# Patient Record
Sex: FEMALE | Race: WHITE | NOT HISPANIC OR LATINO | Employment: OTHER | URBAN - METROPOLITAN AREA
[De-identification: names, ages, dates, MRNs, and addresses within clinical notes are randomized per-mention and may not be internally consistent; named-entity substitution may affect disease eponyms.]

---

## 2022-02-16 ENCOUNTER — OFFICE VISIT (OUTPATIENT)
Dept: FAMILY MEDICINE CLINIC | Facility: CLINIC | Age: 67
End: 2022-02-16

## 2022-02-16 ENCOUNTER — PATIENT ROUNDING (BHMG ONLY) (OUTPATIENT)
Dept: FAMILY MEDICINE CLINIC | Facility: CLINIC | Age: 67
End: 2022-02-16

## 2022-02-16 VITALS
OXYGEN SATURATION: 98 % | HEART RATE: 84 BPM | BODY MASS INDEX: 28.27 KG/M2 | DIASTOLIC BLOOD PRESSURE: 80 MMHG | HEIGHT: 60 IN | WEIGHT: 144 LBS | SYSTOLIC BLOOD PRESSURE: 132 MMHG | TEMPERATURE: 98.6 F

## 2022-02-16 DIAGNOSIS — E55.9 VITAMIN D DEFICIENCY: ICD-10-CM

## 2022-02-16 DIAGNOSIS — Z86.73 HISTORY OF STROKE: ICD-10-CM

## 2022-02-16 DIAGNOSIS — M54.50 CHRONIC MIDLINE LOW BACK PAIN WITHOUT SCIATICA: ICD-10-CM

## 2022-02-16 DIAGNOSIS — E78.2 MIXED HYPERLIPIDEMIA: ICD-10-CM

## 2022-02-16 DIAGNOSIS — G89.29 CHRONIC MIDLINE LOW BACK PAIN WITHOUT SCIATICA: ICD-10-CM

## 2022-02-16 DIAGNOSIS — I25.2 HISTORY OF MI (MYOCARDIAL INFARCTION): Primary | ICD-10-CM

## 2022-02-16 DIAGNOSIS — I10 PRIMARY HYPERTENSION: ICD-10-CM

## 2022-02-16 DIAGNOSIS — F33.40 RECURRENT MAJOR DEPRESSIVE DISORDER, IN REMISSION: ICD-10-CM

## 2022-02-16 DIAGNOSIS — R73.03 PREDIABETES: ICD-10-CM

## 2022-02-16 DIAGNOSIS — K21.9 GASTROESOPHAGEAL REFLUX DISEASE WITHOUT ESOPHAGITIS: ICD-10-CM

## 2022-02-16 PROBLEM — S22.060D CLOSED WEDGE COMPRESSION FRACTURE OF T7 VERTEBRA WITH ROUTINE HEALING: Status: ACTIVE | Noted: 2022-02-16

## 2022-02-16 PROBLEM — Z87.898 HISTORY OF SEIZURE: Status: ACTIVE | Noted: 2022-02-16

## 2022-02-16 PROCEDURE — 99204 OFFICE O/P NEW MOD 45 MIN: CPT | Performed by: FAMILY MEDICINE

## 2022-02-16 RX ORDER — CYCLOBENZAPRINE HCL 5 MG
5 TABLET ORAL 3 TIMES DAILY PRN
COMMUNITY
End: 2022-09-08 | Stop reason: SDUPTHER

## 2022-02-16 RX ORDER — CLONIDINE HYDROCHLORIDE 0.1 MG/1
0.1 TABLET ORAL 2 TIMES DAILY
Qty: 180 TABLET | Refills: 3 | Status: SHIPPED | OUTPATIENT
Start: 2022-02-16 | End: 2022-09-08 | Stop reason: SDUPTHER

## 2022-02-16 RX ORDER — CITALOPRAM 40 MG/1
40 TABLET ORAL DAILY
COMMUNITY
End: 2022-02-16 | Stop reason: SDUPTHER

## 2022-02-16 RX ORDER — ATENOLOL AND CHLORTHALIDONE TABLET 50; 25 MG/1; MG/1
1 TABLET ORAL DAILY
COMMUNITY
End: 2022-02-16 | Stop reason: SDUPTHER

## 2022-02-16 RX ORDER — HYDROXYZINE HYDROCHLORIDE 25 MG/1
25 TABLET, FILM COATED ORAL 3 TIMES DAILY PRN
COMMUNITY
End: 2022-09-08 | Stop reason: SDUPTHER

## 2022-02-16 RX ORDER — NAPROXEN SODIUM 220 MG
220 TABLET ORAL 2 TIMES DAILY PRN
COMMUNITY
End: 2022-09-08 | Stop reason: SDUPTHER

## 2022-02-16 RX ORDER — FAMOTIDINE 20 MG/1
20 TABLET, FILM COATED ORAL NIGHTLY PRN
COMMUNITY
End: 2022-02-16 | Stop reason: SDUPTHER

## 2022-02-16 RX ORDER — FAMOTIDINE 20 MG/1
20 TABLET, FILM COATED ORAL NIGHTLY PRN
Qty: 90 TABLET | Refills: 3 | Status: SHIPPED | OUTPATIENT
Start: 2022-02-16 | End: 2022-09-08 | Stop reason: SDUPTHER

## 2022-02-16 RX ORDER — EVOLOCUMAB 140 MG/ML
140 INJECTION, SOLUTION SUBCUTANEOUS
COMMUNITY
End: 2022-02-16 | Stop reason: SDUPTHER

## 2022-02-16 RX ORDER — ASPIRIN 325 MG
325 TABLET ORAL DAILY
COMMUNITY

## 2022-02-16 RX ORDER — CITALOPRAM 40 MG/1
40 TABLET ORAL DAILY
Qty: 90 TABLET | Refills: 3 | Status: SHIPPED | OUTPATIENT
Start: 2022-02-16 | End: 2022-09-08 | Stop reason: SDUPTHER

## 2022-02-16 RX ORDER — CLONIDINE HYDROCHLORIDE 0.1 MG/1
0.1 TABLET ORAL 2 TIMES DAILY
COMMUNITY
End: 2022-02-16 | Stop reason: SDUPTHER

## 2022-02-16 RX ORDER — ATENOLOL AND CHLORTHALIDONE TABLET 50; 25 MG/1; MG/1
1 TABLET ORAL DAILY
Qty: 90 TABLET | Refills: 3 | Status: SHIPPED | OUTPATIENT
Start: 2022-02-16 | End: 2022-09-08 | Stop reason: SDUPTHER

## 2022-02-16 RX ORDER — EVOLOCUMAB 140 MG/ML
140 INJECTION, SOLUTION SUBCUTANEOUS
Qty: 6 ML | Refills: 3 | Status: SHIPPED | OUTPATIENT
Start: 2022-02-16 | End: 2022-02-25 | Stop reason: SDUPTHER

## 2022-02-16 NOTE — PROGRESS NOTES
February 16, 2022    Hello, may I speak with Priscilla Fragoso?    My name is Sabrina    I am  with ALEE CASAREZ  [facility]  [address]  [place] [address].    Before we get started may I verify your date of birth? 1955    I am calling to officially welcome you to our practice and ask about your recent visit. Is this a good time to talk? yes    Tell me about your visit with us. What things went well?  Everything was great!       We're always looking for ways to make our patients' experiences even better. Do you have recommendations on ways we may improve?  no    Overall were you satisfied with your first visit to our practice? yes       I appreciate you taking the time to speak with me today. Is there anything else I can do for you? no      Thank you, and have a great day.

## 2022-02-16 NOTE — PROGRESS NOTES
"Subjective   Priscilla Fragoso is a 66 y.o. female.   Chief Complaint   Patient presents with   • Back Pain       History of Present Illness   Presents to the office today as a new patient.  Absolutely 0 information in epic.  No paper records scanned in.  Apparently moved here from [place].  Tells me moved here 3 months ago.  Last saw a doctor about 4 months ago.   She defines herself as a caregiver.  She has moved through several different states to take care of family members who needed her.  Most recently she was in [place].  Before that she was in [place].    She is about out of all meds and she needs refills.  We were able to verify her medication list with her today and this is how we populated her chart.    Had labs about 4 months ago when she last saw her most recent doctor..     Fell 2 days after got here from [place]- \"fractured her spine\".  Uses flexeril mainly at night to help with muscle stiffness and soreness related to this.  She was evaluated at [facility], but we do not have any records.    Problem list below is updated in real time with her using details she is able to verbally provide.    Patient Active Problem List    Diagnosis Date Noted   • Mixed hyperlipidemia 02/16/2022     Note Last Updated: 2/16/2022     Intolerant of statins - on Repatha     • Primary hypertension 02/16/2022   • Prediabetes 02/16/2022     Note Last Updated: 2/16/2022     Runs in the family     • History of stroke 02/16/2022     Note Last Updated: 2/16/2022     1986 - still with residual left arm problems     • History of MI (myocardial infarction) 02/16/2022     Note Last Updated: 2/16/2022 2000 - had a cath in [place] ?     • Vitamin D deficiency 02/16/2022   • Gastroesophageal reflux disease without esophagitis 02/16/2022     Note Last Updated: 2/16/2022     Describes LES dysfunction on UGI     • Chronic midline low back pain without sciatica 02/16/2022     Note Last Updated: 2/16/2022     " Due to compression fracture - no details of exactly where     • History of seizure 02/16/2022   • Recurrent major depressive disorder, in remission (HCC) 02/16/2022           Past Surgical History:   Procedure Laterality Date   • EYE SURGERY     • TUBAL ABDOMINAL LIGATION       Current Outpatient Medications on File Prior to Visit   Medication Sig   • aspirin 325 MG tablet Take 325 mg by mouth Daily.   • cyclobenzaprine (FLEXERIL) 5 MG tablet Take 5 mg by mouth 3 (Three) Times a Day As Needed for Muscle Spasms.   • hydrOXYzine (ATARAX) 25 MG tablet Take 25 mg by mouth 3 (Three) Times a Day As Needed for Itching.   • MONTELUKAST SODIUM PO Take  by mouth.   • naproxen sodium (ALEVE) 220 MG tablet Take 220 mg by mouth 2 (Two) Times a Day As Needed.   • sodium chloride 0.9 % nebulizer solution 0.88 mL with albuterol (5 MG/ML) 0.5% nebulizer solution 0.6 mg Take 10 mg/hr by nebulization Continuous.   • vitamin D3 125 MCG (5000 UT) capsule capsule Take 5,000 Units by mouth Daily.   • [DISCONTINUED] atenolol-chlorthalidone (TENORETIC) 50-25 MG per tablet Take 1 tablet by mouth Daily.   • [DISCONTINUED] citalopram (CeleXA) 40 MG tablet Take 40 mg by mouth Daily.   • [DISCONTINUED] cloNIDine (CATAPRES) 0.1 MG tablet Take 0.1 mg by mouth 2 (Two) Times a Day.   • [DISCONTINUED] esomeprazole (nexIUM) 20 MG capsule Take 20 mg by mouth Every Morning Before Breakfast.   • [DISCONTINUED] Evolocumab (Repatha) solution prefilled syringe injection Inject 140 mg under the skin into the appropriate area as directed.   • [DISCONTINUED] famotidine (PEPCID) 20 MG tablet Take 20 mg by mouth At Night As Needed for Heartburn.     No current facility-administered medications on file prior to visit.     Allergies   Allergen Reactions   • Penicillins Swelling   • Sulfa Antibiotics Rash   • Tetanus Toxoids Rash     Social History     Socioeconomic History   • Marital status:    Tobacco Use   • Smoking status: Former Smoker     Quit date:  "1994     Years since quittin.0   • Smokeless tobacco: Never Used   Substance and Sexual Activity   • Alcohol use: Not Currently     History reviewed. No pertinent family history.    Review of Systems    Objective   /80 (BP Location: Left arm, Patient Position: Sitting, Cuff Size: Adult)   Pulse 84   Temp 98.6 °F (37 °C) (Oral)   Ht 152.4 cm (60\")   Wt 65.3 kg (144 lb)   SpO2 98%   BMI 28.12 kg/m²   Physical Exam  Constitutional:       General: She is not in acute distress.     Appearance: She is well-developed. She is not toxic-appearing.      Comments: Wearing a face mask     HENT:      Head: Normocephalic and atraumatic.   Eyes:      Conjunctiva/sclera: Conjunctivae normal.   Cardiovascular:      Rate and Rhythm: Normal rate and regular rhythm.   Pulmonary:      Effort: Pulmonary effort is normal. No respiratory distress.   Musculoskeletal:         General: Normal range of motion.      Cervical back: Normal range of motion.      Right lower leg: No edema.      Left lower leg: No edema.   Skin:     General: Skin is warm and dry.      Findings: No rash.   Neurological:      Mental Status: She is alert and oriented to person, place, and time.      Gait: Gait abnormal (stiff, antalgic gait).   Psychiatric:         Mood and Affect: Mood normal.         Speech: Speech is tangential.         Behavior: Behavior normal. Behavior is cooperative.           Assessment/Plan   Diagnoses and all orders for this visit:    1. History of MI (myocardial infarction) (Primary)    2. Mixed hyperlipidemia  -     Evolocumab (Repatha) solution prefilled syringe injection; Inject 1 mL under the skin into the appropriate area as directed Every 14 (Fourteen) Days.  Dispense: 6 mL; Refill: 3    3. Primary hypertension  -     atenolol-chlorthalidone (TENORETIC) 50-25 MG per tablet; Take 1 tablet by mouth Daily.  Dispense: 90 tablet; Refill: 3  -     cloNIDine (CATAPRES) 0.1 MG tablet; Take 1 tablet by mouth 2 (Two) Times " a Day.  Dispense: 180 tablet; Refill: 3    4. Prediabetes    5. Vitamin D deficiency    6. Gastroesophageal reflux disease without esophagitis  -     esomeprazole (nexIUM) 20 MG capsule; Take 1 capsule by mouth Every Morning Before Breakfast.  Dispense: 90 capsule; Refill: 3  -     famotidine (PEPCID) 20 MG tablet; Take 1 tablet by mouth At Night As Needed for Heartburn.  Dispense: 90 tablet; Refill: 3    7. History of stroke    8. Chronic midline low back pain without sciatica  -     XR Spine Lumbar 2 or 3 View (In Office)  -     XR Spine Thoracic 2 View (In Office)    9. Recurrent major depressive disorder, in remission (HCC)  -     citalopram (CeleXA) 40 MG tablet; Take 1 tablet by mouth Daily.  Dispense: 90 tablet; Refill: 3    Over 45 minutes spent today, mainly talking with Priscilla and collecting verbal history.  We will need to get records from [place] about this fall that she described.  We will get x-rays of her lumbar and thoracic spine since I do not know where this fracture actually was.  If it happened several months ago, she will definitely need a follow-up x-ray regardless.  We have asked her to sign record releases and we will try to get records from her previous doctor to see if there are additional historical details and results of any labs that were done.  I will refill her medications at the doses she specifies today.    She informs me that she was unable to take other statins and that they were also ineffective at lowering her cholesterol, thus her transition to [facility].  I will see her back here in about 3 weeks, hopefully after we have some additional records and we can see what labs were done and make a decision about a labs that need to be repeated or other testing that may be indicated.  I will follow-up with her with the results of her x-ray at the next visit.    Call with any problems or concerns before next visit       Return in about 3 weeks (around 3/9/2022).      Much of this  report is an electronic transcription of spoken language to printed text using Dragon dictation software.  As such, the subtleties and finesse of spoken language may permit erroneous, or at times, nonsensical words or phrases to be inadvertently transcribed; thus changes may be made at a later date to rectify these errors.     Alysia Moe MD2/16/202221:58 EST  This note has been electronically signed

## 2022-02-22 DIAGNOSIS — J45.40 MODERATE PERSISTENT REACTIVE AIRWAY DISEASE WITHOUT COMPLICATION: Primary | ICD-10-CM

## 2022-02-22 DIAGNOSIS — K21.9 GASTROESOPHAGEAL REFLUX DISEASE WITHOUT ESOPHAGITIS: ICD-10-CM

## 2022-02-22 RX ORDER — ALBUTEROL SULFATE 90 UG/1
2 AEROSOL, METERED RESPIRATORY (INHALATION) EVERY 4 HOURS PRN
Qty: 54 G | Refills: 3 | Status: SHIPPED | OUTPATIENT
Start: 2022-02-22 | End: 2022-02-25 | Stop reason: SDUPTHER

## 2022-02-22 RX ORDER — ALBUTEROL SULFATE 0.63 MG/3ML
1 SOLUTION RESPIRATORY (INHALATION) NIGHTLY
Qty: 100 EACH | Refills: 3 | Status: SHIPPED | OUTPATIENT
Start: 2022-02-22 | End: 2022-02-25 | Stop reason: SDUPTHER

## 2022-02-25 DIAGNOSIS — J45.40 MODERATE PERSISTENT REACTIVE AIRWAY DISEASE WITHOUT COMPLICATION: ICD-10-CM

## 2022-02-25 DIAGNOSIS — E78.2 MIXED HYPERLIPIDEMIA: ICD-10-CM

## 2022-02-25 RX ORDER — ALBUTEROL SULFATE 0.63 MG/3ML
1 SOLUTION RESPIRATORY (INHALATION) NIGHTLY
Qty: 100 EACH | Refills: 3 | Status: SHIPPED | OUTPATIENT
Start: 2022-02-25 | End: 2022-09-08 | Stop reason: SDUPTHER

## 2022-02-25 RX ORDER — ALBUTEROL SULFATE 90 UG/1
2 AEROSOL, METERED RESPIRATORY (INHALATION) EVERY 4 HOURS PRN
Qty: 54 G | Refills: 3 | Status: SHIPPED | OUTPATIENT
Start: 2022-02-25 | End: 2022-09-08 | Stop reason: SDUPTHER

## 2022-02-25 RX ORDER — EVOLOCUMAB 140 MG/ML
140 INJECTION, SOLUTION SUBCUTANEOUS
Qty: 6 ML | Refills: 3 | Status: SHIPPED | OUTPATIENT
Start: 2022-02-25 | End: 2022-09-08 | Stop reason: SDUPTHER

## 2022-02-25 NOTE — TELEPHONE ENCOUNTER
Caller: [place] Priscilla    Relationship: Self    Best call back number: [phone]    Requested Prescriptions:   Requested Prescriptions     Pending Prescriptions Disp Refills   • Evolocumab (Repatha) solution prefilled syringe injection 6 mL 3     Sig: Inject 1 mL under the skin into the appropriate area as directed Every 14 (Fourteen) Days.   • albuterol sulfate  (90 Base) MCG/ACT inhaler 54 g 3     Sig: Inhale 2 puffs Every 4 (Four) Hours As Needed for Wheezing.   • albuterol (ACCUNEB) 0.63 MG/3ML nebulizer solution 100 each 3     Sig: Take 3 mL by nebulization Every Night.        Pharmacy where request should be sent: [id number] - [place], [place] - [address] [facility] [address] [facility] - [phone] [address] [phone] FX     Additional details provided by patient:PATIENT STATES SHE IS COMPLETELY OUT OF THE MEDICATION.    Does the patient have less than a 3 day supply:  [x] Yes  [] No    Lisa Haynes Rep   02/25/22 14:20 EST

## 2022-03-28 ENCOUNTER — TELEPHONE (OUTPATIENT)
Dept: FAMILY MEDICINE CLINIC | Facility: CLINIC | Age: 67
End: 2022-03-28

## 2022-03-28 DIAGNOSIS — E78.2 MIXED HYPERLIPIDEMIA: ICD-10-CM

## 2022-03-28 RX ORDER — EVOLOCUMAB 140 MG/ML
140 INJECTION, SOLUTION SUBCUTANEOUS
Qty: 6 ML | Refills: 3 | Status: CANCELLED | OUTPATIENT
Start: 2022-03-28

## 2022-03-28 NOTE — TELEPHONE ENCOUNTER
Called patient do not have records for me to do the pa request. She is calling to see why we have not received them

## 2022-06-24 ENCOUNTER — TELEPHONE (OUTPATIENT)
Dept: FAMILY MEDICINE CLINIC | Facility: CLINIC | Age: 67
End: 2022-06-24

## 2022-06-24 RX ORDER — ONDANSETRON 4 MG/1
4 TABLET, ORALLY DISINTEGRATING ORAL EVERY 8 HOURS PRN
Qty: 24 TABLET | Refills: 0 | Status: SHIPPED | OUTPATIENT
Start: 2022-06-24

## 2022-06-24 NOTE — TELEPHONE ENCOUNTER
Called patient she states she got a test from the health department. She states she woke up Tuesday with diarrhea, nausea, headache, and vomiting. Patient states she would like something for nausea. Please advise.     She uses [facility] in [place]

## 2022-06-24 NOTE — TELEPHONE ENCOUNTER
Caller: Priscilla Fragoso    Relationship to patient: Self    Best call back number: [phone]     Date of positive COVID19 test: 6/21/22    COVID19 symptoms: STOMACH ACHE, DIARRHEA, BODY ACHES, SHORTNESS OF BREATH    Date of initial quarantine: 6/21/22    Additional information or concerns: PATIENT IS WANTING TO KNOW IF A MEDICATION CAN BE CALLED IN TO HELP WITH HER SYMPTOMS.       What is the patients preferred pharmacy: [id number] - [place], [place] - [address] - [phone] [facility] [phone]

## 2022-06-24 NOTE — TELEPHONE ENCOUNTER
I have sent a prescription for a dissolving tablet called Zofran.  She can take it every 8 hours.  Should help with the nausea and vomiting.  Try to keep up fluid intake to prevent dehydration.  Typically small volumes of fluid frequently, such as 1 ounce every hour can be kept down without causing vomiting.  If her condition worsens over the weekend, then I would recommend she go to the emergency room.  Thanks

## 2022-09-08 ENCOUNTER — OFFICE VISIT (OUTPATIENT)
Dept: FAMILY MEDICINE CLINIC | Facility: CLINIC | Age: 67
End: 2022-09-08

## 2022-09-08 VITALS
HEART RATE: 85 BPM | BODY MASS INDEX: 28 KG/M2 | TEMPERATURE: 97.3 F | SYSTOLIC BLOOD PRESSURE: 156 MMHG | DIASTOLIC BLOOD PRESSURE: 83 MMHG | WEIGHT: 142.6 LBS | HEIGHT: 60 IN | OXYGEN SATURATION: 94 %

## 2022-09-08 DIAGNOSIS — J45.40 MODERATE PERSISTENT REACTIVE AIRWAY DISEASE WITHOUT COMPLICATION: ICD-10-CM

## 2022-09-08 DIAGNOSIS — G89.29 CHRONIC MIDLINE LOW BACK PAIN WITHOUT SCIATICA: ICD-10-CM

## 2022-09-08 DIAGNOSIS — K21.9 GASTROESOPHAGEAL REFLUX DISEASE WITHOUT ESOPHAGITIS: ICD-10-CM

## 2022-09-08 DIAGNOSIS — S22.060D CLOSED WEDGE COMPRESSION FRACTURE OF T7 VERTEBRA WITH ROUTINE HEALING: ICD-10-CM

## 2022-09-08 DIAGNOSIS — F33.40 RECURRENT MAJOR DEPRESSIVE DISORDER, IN REMISSION: ICD-10-CM

## 2022-09-08 DIAGNOSIS — E78.2 MIXED HYPERLIPIDEMIA: ICD-10-CM

## 2022-09-08 DIAGNOSIS — M54.2 NECK PAIN: Primary | ICD-10-CM

## 2022-09-08 DIAGNOSIS — M54.50 CHRONIC MIDLINE LOW BACK PAIN WITHOUT SCIATICA: ICD-10-CM

## 2022-09-08 DIAGNOSIS — I10 PRIMARY HYPERTENSION: ICD-10-CM

## 2022-09-08 PROCEDURE — 99213 OFFICE O/P EST LOW 20 MIN: CPT | Performed by: NURSE PRACTITIONER

## 2022-09-08 RX ORDER — CYCLOBENZAPRINE HCL 5 MG
5 TABLET ORAL 3 TIMES DAILY PRN
Qty: 90 TABLET | Refills: 1 | Status: SHIPPED | OUTPATIENT
Start: 2022-09-08

## 2022-09-08 RX ORDER — CLONIDINE HYDROCHLORIDE 0.1 MG/1
0.1 TABLET ORAL 2 TIMES DAILY
Qty: 180 TABLET | Refills: 3 | Status: SHIPPED | OUTPATIENT
Start: 2022-09-08 | End: 2023-01-26 | Stop reason: SDUPTHER

## 2022-09-08 RX ORDER — EVOLOCUMAB 140 MG/ML
140 INJECTION, SOLUTION SUBCUTANEOUS
Qty: 6 ML | Refills: 3 | Status: SHIPPED | OUTPATIENT
Start: 2022-09-08

## 2022-09-08 RX ORDER — MONTELUKAST SODIUM 10 MG/1
10 TABLET ORAL NIGHTLY
Qty: 90 TABLET | Refills: 1 | Status: SHIPPED | OUTPATIENT
Start: 2022-09-08 | End: 2023-01-26 | Stop reason: SDUPTHER

## 2022-09-08 RX ORDER — CITALOPRAM 40 MG/1
40 TABLET ORAL DAILY
Qty: 90 TABLET | Refills: 1 | Status: SHIPPED | OUTPATIENT
Start: 2022-09-08 | End: 2023-01-24

## 2022-09-08 RX ORDER — PREDNISONE 5 MG/1
TABLET ORAL
Qty: 20 TABLET | Refills: 0 | Status: SHIPPED | OUTPATIENT
Start: 2022-09-08 | End: 2022-09-16

## 2022-09-08 RX ORDER — ATENOLOL AND CHLORTHALIDONE TABLET 50; 25 MG/1; MG/1
1 TABLET ORAL DAILY
Qty: 90 TABLET | Refills: 3 | Status: SHIPPED | OUTPATIENT
Start: 2022-09-08 | End: 2023-01-26 | Stop reason: SDUPTHER

## 2022-09-08 RX ORDER — FAMOTIDINE 20 MG/1
20 TABLET, FILM COATED ORAL NIGHTLY PRN
Qty: 90 TABLET | Refills: 3 | Status: SHIPPED | OUTPATIENT
Start: 2022-09-08 | End: 2023-01-26 | Stop reason: SDUPTHER

## 2022-09-08 RX ORDER — ALBUTEROL SULFATE 0.63 MG/3ML
1 SOLUTION RESPIRATORY (INHALATION) NIGHTLY
Qty: 100 EACH | Refills: 3 | Status: SHIPPED | OUTPATIENT
Start: 2022-09-08 | End: 2023-01-26 | Stop reason: SDUPTHER

## 2022-09-08 RX ORDER — ALBUTEROL SULFATE 90 UG/1
2 AEROSOL, METERED RESPIRATORY (INHALATION) EVERY 4 HOURS PRN
Qty: 54 G | Refills: 3 | Status: SHIPPED | OUTPATIENT
Start: 2022-09-08 | End: 2023-01-26 | Stop reason: SDUPTHER

## 2022-09-08 RX ORDER — NAPROXEN SODIUM 220 MG
220 TABLET ORAL 2 TIMES DAILY WITH MEALS
Qty: 60 TABLET | Refills: 2 | Status: SHIPPED | OUTPATIENT
Start: 2022-09-08

## 2022-09-08 RX ORDER — HYDROXYZINE HYDROCHLORIDE 25 MG/1
25 TABLET, FILM COATED ORAL 3 TIMES DAILY PRN
Qty: 90 TABLET | Refills: 2 | Status: SHIPPED | OUTPATIENT
Start: 2022-09-08 | End: 2023-01-26 | Stop reason: SDUPTHER

## 2022-09-08 NOTE — PROGRESS NOTES
"    Priscilla Fragoso is a 67 y.o. female.     67-year-old white female with history of degenerative disc disease thoracic and lumbar and T7 compression fracture who comes in today with complaints of mid back pain with numbness and tingling and left arm also complains of low back pain with radiculopathy.  She also complains of neck pain she is taking ibuprofen at home I am reordering her Aleve and Flexeril ordered by Dr. Moe I am placing her on low-dose steroids.  I instructed her to use heat and do absolutely no lifting while on these medications.  I also advised her next course of action would be physical therapy and to follow-up with Dr. Moe    Vital signs are stable              Prednisone 5 mg taper dose  Home Aleve and Flexeril  Moist heat  No lifting  Cervical x-ray  Follow-up with Dr. Moe no improvement                       The following portions of the patient's history were reviewed and updated as appropriate: allergies, current medications, past family history, past medical history, past social history, past surgical history and problem list.    Vitals:    09/08/22 1421   BP: 156/83   BP Location: Right arm   Patient Position: Sitting   Cuff Size: Adult   Pulse: 85   Temp: 97.3 °F (36.3 °C)   TempSrc: Temporal   SpO2: 94%   Weight: 64.7 kg (142 lb 9.6 oz)   Height: 152.4 cm (60\")     Body mass index is 27.85 kg/m².    Past Medical History:   Diagnosis Date   • Acid reflux    • Depression    • Headache    • Hyperlipidemia    • Hypertension    • Peptic ulceration    • Stroke (HCC)      Past Surgical History:   Procedure Laterality Date   • EYE SURGERY     • TUBAL ABDOMINAL LIGATION       History reviewed. No pertinent family history.    There is no immunization history on file for this patient.    No results found for any previous visit.         Review of Systems   Constitutional: Negative.    HENT: Negative.    Respiratory: Negative.    Genitourinary: Negative.    Musculoskeletal: Positive for " back pain and neck pain.   Skin: Negative.    Neurological: Positive for numbness.   Psychiatric/Behavioral: Negative.        Objective   Physical Exam  Constitutional:       Appearance: Normal appearance.   HENT:      Head: Normocephalic.   Cardiovascular:      Rate and Rhythm: Normal rate and regular rhythm.      Pulses: Normal pulses.   Pulmonary:      Effort: Pulmonary effort is normal.   Abdominal:      General: Bowel sounds are normal.   Musculoskeletal:         General: Normal range of motion.   Skin:     General: Skin is warm and dry.   Neurological:      General: No focal deficit present.      Mental Status: She is alert.         Procedures    Assessment & Plan   Diagnoses and all orders for this visit:    1. Neck pain (Primary)  -     XR Spine Cervical 2 or 3 View    2. Chronic midline low back pain without sciatica  -     naproxen sodium (ALEVE) 220 MG tablet; Take 1 tablet by mouth 2 (Two) Times a Day With Meals.  Dispense: 60 tablet; Refill: 2  -     cyclobenzaprine (FLEXERIL) 5 MG tablet; Take 1 tablet by mouth 3 (Three) Times a Day As Needed for Muscle Spasms.  Dispense: 90 tablet; Refill: 1    3. Gastroesophageal reflux disease without esophagitis  -     famotidine (PEPCID) 20 MG tablet; Take 1 tablet by mouth At Night As Needed for Heartburn.  Dispense: 90 tablet; Refill: 3  -     esomeprazole (nexIUM) 20 MG capsule; Take 1 capsule by mouth Every Morning Before Breakfast.  Dispense: 90 capsule; Refill: 3    4. Primary hypertension  -     cloNIDine (CATAPRES) 0.1 MG tablet; Take 1 tablet by mouth 2 (Two) Times a Day.  Dispense: 180 tablet; Refill: 3  -     atenolol-chlorthalidone (TENORETIC) 50-25 MG per tablet; Take 1 tablet by mouth Daily.  Dispense: 90 tablet; Refill: 3    5. Recurrent major depressive disorder, in remission (HCC)  -     citalopram (CeleXA) 40 MG tablet; Take 1 tablet by mouth Daily.  Dispense: 90 tablet; Refill: 1    6. Moderate persistent reactive airway disease without  complication  -     albuterol sulfate  (90 Base) MCG/ACT inhaler; Inhale 2 puffs Every 4 (Four) Hours As Needed for Wheezing.  Dispense: 54 g; Refill: 3  -     albuterol (ACCUNEB) 0.63 MG/3ML nebulizer solution; Take 3 mL by nebulization Every Night.  Dispense: 100 each; Refill: 3    7. Mixed hyperlipidemia  -     Evolocumab (Repatha) solution prefilled syringe injection; Inject 1 mL under the skin into the appropriate area as directed Every 14 (Fourteen) Days.  Dispense: 6 mL; Refill: 3    8. Closed wedge compression fracture of T7 vertebra with routine healing    Other orders  -     hydrOXYzine (ATARAX) 25 MG tablet; Take 1 tablet by mouth 3 (Three) Times a Day As Needed for Itching.  Dispense: 90 tablet; Refill: 2  -     montelukast (SINGULAIR) 10 MG tablet; Take 1 tablet by mouth Every Night.  Dispense: 90 tablet; Refill: 1  -     predniSONE 5 MG (48) tablet therapy pack dose pack; Take 4 tablets by mouth Daily for 2 days, THEN 3 tablets Daily for 2 days, THEN 2 tablets Daily for 2 days, THEN 1 tablet Daily for 2 days.  Dispense: 20 tablet; Refill: 0          Current Outpatient Medications:   •  albuterol (ACCUNEB) 0.63 MG/3ML nebulizer solution, Take 3 mL by nebulization Every Night., Disp: 100 each, Rfl: 3  •  albuterol sulfate  (90 Base) MCG/ACT inhaler, Inhale 2 puffs Every 4 (Four) Hours As Needed for Wheezing., Disp: 54 g, Rfl: 3  •  aspirin 325 MG tablet, Take 325 mg by mouth Daily., Disp: , Rfl:   •  atenolol-chlorthalidone (TENORETIC) 50-25 MG per tablet, Take 1 tablet by mouth Daily., Disp: 90 tablet, Rfl: 3  •  citalopram (CeleXA) 40 MG tablet, Take 1 tablet by mouth Daily., Disp: 90 tablet, Rfl: 1  •  cloNIDine (CATAPRES) 0.1 MG tablet, Take 1 tablet by mouth 2 (Two) Times a Day., Disp: 180 tablet, Rfl: 3  •  cyclobenzaprine (FLEXERIL) 5 MG tablet, Take 1 tablet by mouth 3 (Three) Times a Day As Needed for Muscle Spasms., Disp: 90 tablet, Rfl: 1  •  esomeprazole (nexIUM) 20 MG capsule,  Take 1 capsule by mouth Every Morning Before Breakfast., Disp: 90 capsule, Rfl: 3  •  Evolocumab (Repatha) solution prefilled syringe injection, Inject 1 mL under the skin into the appropriate area as directed Every 14 (Fourteen) Days., Disp: 6 mL, Rfl: 3  •  famotidine (PEPCID) 20 MG tablet, Take 1 tablet by mouth At Night As Needed for Heartburn., Disp: 90 tablet, Rfl: 3  •  hydrOXYzine (ATARAX) 25 MG tablet, Take 1 tablet by mouth 3 (Three) Times a Day As Needed for Itching., Disp: 90 tablet, Rfl: 2  •  naproxen sodium (ALEVE) 220 MG tablet, Take 1 tablet by mouth 2 (Two) Times a Day With Meals., Disp: 60 tablet, Rfl: 2  •  ondansetron ODT (Zofran ODT) 4 MG disintegrating tablet, Place 1 tablet on the tongue Every 8 (Eight) Hours As Needed for Nausea or Vomiting., Disp: 24 tablet, Rfl: 0  •  vitamin D3 125 MCG (5000 UT) capsule capsule, Take 5,000 Units by mouth Daily., Disp: , Rfl:   •  montelukast (SINGULAIR) 10 MG tablet, Take 1 tablet by mouth Every Night., Disp: 90 tablet, Rfl: 1  •  predniSONE 5 MG (48) tablet therapy pack dose pack, Take 4 tablets by mouth Daily for 2 days, THEN 3 tablets Daily for 2 days, THEN 2 tablets Daily for 2 days, THEN 1 tablet Daily for 2 days., Disp: 20 tablet, Rfl: 0           DAYRON Barnes 9/8/2022 14:50 EDT  This note has been electronically signed

## 2022-09-08 NOTE — PATIENT INSTRUCTIONS
Prednisone 5 mg taper dose  Home Aleve and Flexeril  Moist heat  No lifting  Cervical x-ray  Follow-up with Dr. Moe no improvement

## 2023-01-24 ENCOUNTER — OFFICE VISIT (OUTPATIENT)
Dept: FAMILY MEDICINE CLINIC | Facility: CLINIC | Age: 68
End: 2023-01-24
Payer: MEDICARE

## 2023-01-24 VITALS
TEMPERATURE: 98.5 F | HEART RATE: 87 BPM | BODY MASS INDEX: 28 KG/M2 | HEIGHT: 60 IN | WEIGHT: 142.6 LBS | DIASTOLIC BLOOD PRESSURE: 90 MMHG | OXYGEN SATURATION: 98 % | SYSTOLIC BLOOD PRESSURE: 138 MMHG

## 2023-01-24 DIAGNOSIS — R11.2 NAUSEA AND VOMITING, UNSPECIFIED VOMITING TYPE: Primary | ICD-10-CM

## 2023-01-24 DIAGNOSIS — R10.31 ABDOMINAL PAIN, RLQ: ICD-10-CM

## 2023-01-24 DIAGNOSIS — R10.32 LLQ PAIN: ICD-10-CM

## 2023-01-24 DIAGNOSIS — R10.13 EPIGASTRIC PAIN: ICD-10-CM

## 2023-01-24 LAB
EXPIRATION DATE: NORMAL
FLUAV AG UPPER RESP QL IA.RAPID: NOT DETECTED
FLUBV AG UPPER RESP QL IA.RAPID: NOT DETECTED
INTERNAL CONTROL: NORMAL
Lab: NORMAL
SARS-COV-2 AG UPPER RESP QL IA.RAPID: NOT DETECTED

## 2023-01-24 PROCEDURE — 87428 SARSCOV & INF VIR A&B AG IA: CPT | Performed by: NURSE PRACTITIONER

## 2023-01-24 PROCEDURE — 99214 OFFICE O/P EST MOD 30 MIN: CPT | Performed by: NURSE PRACTITIONER

## 2023-01-24 RX ORDER — PROMETHAZINE HYDROCHLORIDE 12.5 MG/1
12.5 TABLET ORAL EVERY 6 HOURS PRN
Qty: 21 TABLET | Refills: 0 | Status: SHIPPED | OUTPATIENT
Start: 2023-01-24

## 2023-01-24 NOTE — ASSESSMENT & PLAN NOTE
Findings:  There is a scattered amount of gas and residue within bowel in a nonspecific fashion. There are pelvic calcifications which could reflect phleboliths. There are some degenerative changes in the lower lumbar spine.     IMPRESSION:  1.Nonspecific bowel gas pattern.

## 2023-01-24 NOTE — PROGRESS NOTES
"Chief Complaint  Vomiting    Subjective          Priscilla Fragoso presents to [facility]      History of Present Illness    Priscilla is a 67-year-old female patient of Dr. Alysia Moe who presents today with complaints of vomiting.     Nine days ago woke up with headache and vomited yellow bile. She has since been with vomiting in the morning upon waking. She does not have any CP. She is not eating and drinking normally. She has been able to keep down room temp water and bullion hardly anything else.  She did have some saltines at one point.  When she sits up sits up, she vomits on more occasions than not. Hx of GERD. 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 She is negative for flu and COVID today.    She is under lots of stress at home. Her sister is with brain lesions and she is worried. This is around the time she started vomiting. She tells me she used to see gastroenterology in [place].  She is currently on a PPI omeprazole 20 mg daily.  She does also take Pepcid 20 mg nightly.  Blood pressure stable at 138/90.  Patient afebrile.      Objective     Vital Signs:   /90 (BP Location: Left arm, Patient Position: Sitting, Cuff Size: Adult)   Pulse 87   Temp 98.5 °F (36.9 °C) (Oral)   Ht 152.4 cm (60\")   Wt 64.7 kg (142 lb 9.6 oz)   SpO2 98%   BMI 27.85 kg/m²           Physical Exam  Vitals reviewed.   Constitutional:       Appearance: She is well-developed.      Comments: Wearing a face mask     HENT:      Head: Normocephalic and atraumatic.   Eyes:      Conjunctiva/sclera: Conjunctivae normal.   Cardiovascular:      Rate and Rhythm: Normal rate.   Pulmonary:      Effort: Pulmonary effort is normal.   Abdominal:      General: Bowel sounds are normal.      Palpations: Abdomen is soft. There is no shifting dullness or hepatomegaly.      Tenderness: There is abdominal tenderness in the right lower quadrant, epigastric area and left lower quadrant. There is " guarding. There is no right CVA tenderness, left CVA tenderness or rebound.       Musculoskeletal:         General: Normal range of motion.      Cervical back: Normal range of motion.   Skin:     General: Skin is warm and dry.      Findings: No rash.   Neurological:      Mental Status: She is alert and oriented to person, place, and time.   Psychiatric:         Behavior: Behavior normal.                Result Review :                                   Assessment and Plan      Diagnoses and all orders for this visit:    1. Nausea and vomiting, unspecified vomiting type (Primary)  -     POCT SARS-CoV-2 Antigen SUKH  -     XR Abdomen KUB (In Office)  -     promethazine (PHENERGAN) 12.5 MG tablet; Take 1 tablet by mouth Every 6 (Six) Hours As Needed for Nausea or Vomiting.  Dispense: 21 tablet; Refill: 0  -     Cancel: US Abdomen Complete; Future  -     US Abdomen Complete; Future  -     Ambulatory Referral to Gastroenterology    2. Epigastric pain  Assessment & Plan:  Findings:  There is a scattered amount of gas and residue within bowel in a nonspecific fashion. There are pelvic calcifications which could reflect phleboliths. There are some degenerative changes in the lower lumbar spine.     IMPRESSION:  1.Nonspecific bowel gas pattern.    Orders:  -     Cancel: US Abdomen Complete; Future  -     US Abdomen Complete; Future  -     Ambulatory Referral to Gastroenterology    3. LLQ pain  -     Cancel: US Abdomen Complete; Future  -     US Abdomen Complete; Future  -     Ambulatory Referral to Gastroenterology    4. Abdominal pain, RLQ  -     Cancel: US Abdomen Complete; Future  -     US Abdomen Complete; Future  -     Ambulatory Referral to Gastroenterology    Patient KUB negative for intestinal obstruction, constipation.  No negation of stone.  It is possible patient has inflammatory bowel disease.  Her symptoms are diffuse.  She has a history of GERD.  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  I am going  to give her Phenergan to help with the nausea and vomiting.  In the meantime, I Dolly refer her for ultrasound of abdomen complete and I would like to get her referred to Dr. Flores with gastroenterology for evaluation.  Patient prefers somewhere close to her.            Follow Up       No follow-ups on file.      Patient was given instructions and counseling regarding her condition or for health maintenance advice. Please see specific information pulled into the AVS if appropriate.     Jacinta Andrade, APRN1/24/202313:58 EST  This note has been electronically signed

## 2023-01-26 DIAGNOSIS — I25.2 HISTORY OF MI (MYOCARDIAL INFARCTION): ICD-10-CM

## 2023-01-26 DIAGNOSIS — J45.40 MODERATE PERSISTENT REACTIVE AIRWAY DISEASE WITHOUT COMPLICATION: ICD-10-CM

## 2023-01-26 DIAGNOSIS — K21.9 GASTROESOPHAGEAL REFLUX DISEASE WITHOUT ESOPHAGITIS: ICD-10-CM

## 2023-01-26 DIAGNOSIS — I10 PRIMARY HYPERTENSION: ICD-10-CM

## 2023-01-26 RX ORDER — CLONIDINE HYDROCHLORIDE 0.1 MG/1
0.1 TABLET ORAL 2 TIMES DAILY
Qty: 180 TABLET | Refills: 3 | Status: SHIPPED | OUTPATIENT
Start: 2023-01-26

## 2023-01-26 RX ORDER — ATENOLOL AND CHLORTHALIDONE TABLET 50; 25 MG/1; MG/1
1 TABLET ORAL DAILY
Qty: 90 TABLET | Refills: 3 | Status: SHIPPED | OUTPATIENT
Start: 2023-01-26

## 2023-01-26 RX ORDER — ALBUTEROL SULFATE 0.63 MG/3ML
1 SOLUTION RESPIRATORY (INHALATION) NIGHTLY
Qty: 100 EACH | Refills: 3 | Status: SHIPPED | OUTPATIENT
Start: 2023-01-26

## 2023-01-26 RX ORDER — ALBUTEROL SULFATE 90 UG/1
2 AEROSOL, METERED RESPIRATORY (INHALATION) EVERY 4 HOURS PRN
Qty: 54 G | Refills: 3 | Status: SHIPPED | OUTPATIENT
Start: 2023-01-26

## 2023-01-26 RX ORDER — MONTELUKAST SODIUM 10 MG/1
10 TABLET ORAL NIGHTLY
Qty: 90 TABLET | Refills: 1 | Status: SHIPPED | OUTPATIENT
Start: 2023-01-26

## 2023-01-26 RX ORDER — HYDROXYZINE HYDROCHLORIDE 25 MG/1
25 TABLET, FILM COATED ORAL 3 TIMES DAILY PRN
Qty: 90 TABLET | Refills: 2 | Status: SHIPPED | OUTPATIENT
Start: 2023-01-26

## 2023-01-26 RX ORDER — FAMOTIDINE 20 MG/1
20 TABLET, FILM COATED ORAL NIGHTLY PRN
Qty: 90 TABLET | Refills: 3 | Status: SHIPPED | OUTPATIENT
Start: 2023-01-26

## 2023-01-26 NOTE — TELEPHONE ENCOUNTER
Caller: Priscilla Fragoso    Relationship: Self    Best call back number: [phone]    Requested Prescriptions:   Requested Prescriptions     Pending Prescriptions Disp Refills   • albuterol (ACCUNEB) 0.63 MG/3ML nebulizer solution 100 each 3     Sig: Take 3 mL by nebulization Every Night.   • albuterol sulfate  (90 Base) MCG/ACT inhaler 54 g 3     Sig: Inhale 2 puffs Every 4 (Four) Hours As Needed for Wheezing.   • esomeprazole (nexIUM) 20 MG capsule 90 capsule 3     Sig: Take 1 capsule by mouth Every Morning Before Breakfast.   • hydrOXYzine (ATARAX) 25 MG tablet 90 tablet 2     Sig: Take 1 tablet by mouth 3 (Three) Times a Day As Needed for Itching.   • atenolol-chlorthalidone (TENORETIC) 50-25 MG per tablet 90 tablet 3     Sig: Take 1 tablet by mouth Daily.   • montelukast (SINGULAIR) 10 MG tablet 90 tablet 1     Sig: Take 1 tablet by mouth Every Night.   • famotidine (PEPCID) 20 MG tablet 90 tablet 3     Sig: Take 1 tablet by mouth At Night As Needed for Heartburn.   • cloNIDine (CATAPRES) 0.1 MG tablet 180 tablet 3     Sig: Take 1 tablet by mouth 2 (Two) Times a Day.        Pharmacy where request should be sent: [id number] - [place], [place] - [address] AT [facility] [phone] [facility] [phone]      Additional details provided by patient: STATES SHE NEEDS REFILLS ON THESE [facility]     Does the patient have less than a 3 day supply:  [] Yes  [x] No    Would you like a call back once the refill request has been completed: [x] Yes [] No    If the office needs to give you a call back, can they leave a voicemail: [x] Yes [] No    Lisa Savage Rep   01/26/23 14:19 EST

## 2023-02-08 ENCOUNTER — HOSPITAL ENCOUNTER (OUTPATIENT)
Dept: ULTRASOUND IMAGING | Facility: HOSPITAL | Age: 68
Discharge: HOME | End: 2023-02-08
Admitting: NURSE PRACTITIONER
Payer: MEDICARE

## 2023-02-08 DIAGNOSIS — R10.32 LLQ PAIN: ICD-10-CM

## 2023-02-08 DIAGNOSIS — R10.31 ABDOMINAL PAIN, RLQ: ICD-10-CM

## 2023-02-08 DIAGNOSIS — R11.2 NAUSEA AND VOMITING, UNSPECIFIED VOMITING TYPE: ICD-10-CM

## 2023-02-08 DIAGNOSIS — R10.13 EPIGASTRIC PAIN: ICD-10-CM

## 2023-02-08 PROCEDURE — 76705 ECHO EXAM OF ABDOMEN: CPT

## 2023-02-09 ENCOUNTER — TELEPHONE (OUTPATIENT)
Dept: FAMILY MEDICINE CLINIC | Facility: CLINIC | Age: 68
End: 2023-02-09
Payer: MEDICARE

## 2023-02-09 NOTE — TELEPHONE ENCOUNTER
HUB TO READ  I left  for pt that was detailed, but if she calls back, please give her msg below from Jacinta:     Please advise Priscilla that the abdominal ultrasound is normal.

## 2023-04-27 ENCOUNTER — PATIENT OUTREACH (OUTPATIENT)
Dept: CASE MANAGEMENT | Facility: OTHER | Age: 68
End: 2023-04-27
Payer: MEDICARE

## 2023-04-27 NOTE — OUTREACH NOTE
AMBULATORY CASE MANAGEMENT NOTE    RN-ACM outreach attempts unsuccessful x4/LVMs w/ RN-ACM contact info; patient call back welcome.   Patient eligible for HRCM outreach and assistance as needed.  No additional outreach scheduled at this time.    EZEQUIEL ZAIDI  Ambulatory Case Management  4/27/2023, 15:56 EDT

## 2023-07-24 RX ORDER — HYDROXYZINE HYDROCHLORIDE 25 MG/1
TABLET, FILM COATED ORAL
Qty: 90 TABLET | Refills: 0 | Status: SHIPPED | OUTPATIENT
Start: 2023-07-24

## 2023-07-24 NOTE — TELEPHONE ENCOUNTER
I will go ahead and fill the hydroxyzine however, patient needs to follow-up on chronic medical conditions with Dr. Alysia Moe.

## 2023-08-02 RX ORDER — MONTELUKAST SODIUM 10 MG/1
10 TABLET ORAL NIGHTLY
Qty: 90 TABLET | Refills: 1 | Status: SHIPPED | OUTPATIENT
Start: 2023-08-02

## 2023-08-18 RX ORDER — HYDROXYZINE HYDROCHLORIDE 25 MG/1
TABLET, FILM COATED ORAL
Qty: 90 TABLET | Refills: 0 | Status: SHIPPED | OUTPATIENT
Start: 2023-08-18

## 2023-08-18 NOTE — TELEPHONE ENCOUNTER
Caller: AI CARDONA    Relationship:     Best call back number: [phone]    What was the call regarding: [facility] [place] IS REQUESTING A PRIOR AUTHORIZATION ON THE   hydrOXYzine (ATARAX) 25 MG tablet [Pharmacy Med Name: HYDROXYZINE HCL 25MG TABS (WHITE)] [3774] (Order [id number]

## 2023-08-22 ENCOUNTER — OFFICE VISIT (OUTPATIENT)
Dept: FAMILY MEDICINE CLINIC | Facility: CLINIC | Age: 68
End: 2023-08-22
Payer: MEDICARE

## 2023-08-22 VITALS
SYSTOLIC BLOOD PRESSURE: 142 MMHG | HEART RATE: 72 BPM | TEMPERATURE: 97.2 F | HEIGHT: 60 IN | WEIGHT: 141.6 LBS | DIASTOLIC BLOOD PRESSURE: 78 MMHG | OXYGEN SATURATION: 99 % | BODY MASS INDEX: 27.8 KG/M2

## 2023-08-22 DIAGNOSIS — G89.29 CHRONIC MIDLINE LOW BACK PAIN WITHOUT SCIATICA: ICD-10-CM
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
## 2023-08-22 DIAGNOSIS — M54.2 NECK PAIN: Primary | ICD-10-CM

## 2023-08-22 DIAGNOSIS — M54.50 CHRONIC MIDLINE LOW BACK PAIN WITHOUT SCIATICA: ICD-10-CM

## 2023-08-22 PROCEDURE — 99213 OFFICE O/P EST LOW 20 MIN: CPT | Performed by: NURSE PRACTITIONER

## 2023-08-22 PROCEDURE — 3078F DIAST BP <80 MM HG: CPT | Performed by: NURSE PRACTITIONER

## 2023-08-22 PROCEDURE — 3077F SYST BP >= 140 MM HG: CPT | Performed by: NURSE PRACTITIONER

## 2023-08-22 RX ORDER — CYCLOBENZAPRINE HCL 5 MG
5 TABLET ORAL 3 TIMES DAILY PRN
Qty: 90 TABLET | Refills: 1 | Status: SHIPPED | OUTPATIENT
Start: 2023-08-22 | End: 2023-08-25 | Stop reason: SDUPTHER

## 2023-08-22 NOTE — PATIENT INSTRUCTIONS
Muscle relaxer and Aleve at home  Gentle range of motion exercises/ice/heat  Cervical x-ray  Follow-up with Dr. Moe if needed

## 2023-08-25 DIAGNOSIS — G89.29 CHRONIC MIDLINE LOW BACK PAIN WITHOUT SCIATICA: ICD-10-CM

## 2023-08-25 DIAGNOSIS — M54.50 CHRONIC MIDLINE LOW BACK PAIN WITHOUT SCIATICA: ICD-10-CM

## 2023-08-25 RX ORDER — CYCLOBENZAPRINE HCL 5 MG
5 TABLET ORAL 3 TIMES DAILY PRN
Qty: 90 TABLET | Refills: 1 | Status: SHIPPED | OUTPATIENT
Start: 2023-08-25

## 2023-08-25 NOTE — TELEPHONE ENCOUNTER
Caller: Priscilla Fragoso    Relationship: Self    Best call back number: [phone]     Requested Prescriptions:   Requested Prescriptions     Pending Prescriptions Disp Refills    cyclobenzaprine (FLEXERIL) 5 MG tablet 90 tablet 1     Sig: Take 1 tablet by mouth 3 (Three) Times a Day As Needed for Muscle Spasms.        Pharmacy where request should be sent: [id number] - [place], [place] - [address] [facility] [phone] [facility] [phone]      Last office visit with prescribing clinician: 2/16/2022   Last telemedicine visit with prescribing clinician: Visit date not found   Next office visit with prescribing clinician: 8/30/2023     Additional details provided by patient: 3 LEFT ON HAND     Does the patient have less than a 3 day supply:  [x] Yes  [] No    Would you like a call back once the refill request has been completed: [x] Yes [] No    If the office needs to give you a call back, can they leave a voicemail: [x] Yes [] No    CHAVO Frank   08/25/23 12:03 EDT

## 2023-08-30 ENCOUNTER — OFFICE VISIT (OUTPATIENT)
Dept: FAMILY MEDICINE CLINIC | Facility: CLINIC | Age: 68
End: 2023-08-30
Payer: MEDICARE

## 2023-08-30 VITALS
SYSTOLIC BLOOD PRESSURE: 167 MMHG | DIASTOLIC BLOOD PRESSURE: 100 MMHG | OXYGEN SATURATION: 96 % | HEART RATE: 83 BPM | BODY MASS INDEX: 27.88 KG/M2 | HEIGHT: 60 IN | WEIGHT: 142 LBS | RESPIRATION RATE: 18 BRPM

## 2023-08-30 DIAGNOSIS — I25.2 HISTORY OF MI (MYOCARDIAL INFARCTION): ICD-10-CM

## 2023-08-30 DIAGNOSIS — R09.89 BRUIT OF LEFT CAROTID ARTERY: ICD-10-CM

## 2023-08-30 DIAGNOSIS — E78.2 MIXED HYPERLIPIDEMIA: Primary | ICD-10-CM

## 2023-08-30 DIAGNOSIS — I10 PRIMARY HYPERTENSION: ICD-10-CM

## 2023-08-30 DIAGNOSIS — R20.2 NUMBNESS AND TINGLING IN LEFT ARM: ICD-10-CM

## 2023-08-30 DIAGNOSIS — Z86.73 HISTORY OF STROKE: ICD-10-CM

## 2023-08-30 DIAGNOSIS — R73.03 PREDIABETES: ICD-10-CM

## 2023-08-30 DIAGNOSIS — I16.0 HYPERTENSIVE URGENCY: ICD-10-CM

## 2023-08-30 DIAGNOSIS — E55.9 VITAMIN D DEFICIENCY: ICD-10-CM

## 2023-08-30 DIAGNOSIS — R20.0 NUMBNESS AND TINGLING IN LEFT ARM: ICD-10-CM

## 2023-08-30 RX ORDER — EVOLOCUMAB 140 MG/ML
140 INJECTION, SOLUTION SUBCUTANEOUS
Qty: 6 ML | Refills: 3 | Status: SHIPPED | OUTPATIENT
Start: 2023-08-30

## 2023-08-30 RX ORDER — AMLODIPINE BESYLATE 5 MG/1
5 TABLET ORAL DAILY
Qty: 30 TABLET | Refills: 2 | Status: SHIPPED | OUTPATIENT
Start: 2023-08-30 | End: 2023-08-30

## 2023-08-30 RX ORDER — AMLODIPINE BESYLATE 5 MG/1
5 TABLET ORAL DAILY
Qty: 90 TABLET | Refills: 0 | Status: SHIPPED | OUTPATIENT
Start: 2023-08-30

## 2023-08-30 NOTE — PROGRESS NOTES
"Subjective   Priscilla Fragoso is a 68 y.o. female.   Chief Complaint   Patient presents with    Follow-up    Arm Pain       History of Present Illness   Presents to the office today for what the schedule says is ER follow-up.  I have seen her grand total of 1 time before in February 2022.  Did not follow-up as requested.  She has seen the nurse practitioners a few times.  She tells me \"there were never appointments with me\".  That is just not an explanation over an 18-month period of time.  There were appointments that she no showed for.  She went to the ER at [facility] on August 21.  The left side of her face was numb.  Left arm hurt.  Apparently her blood pressure was 208/99.  They focused on getting her blood pressure down it was 178/89 when she was discharged home.  I did x-rays of her neck.  Told her she had carotid calcifications and cervical degenerative disc disease.  Discharge diagnosis was strain of her neck.  She tells me the whole left side of her body has been numb.  She has been out of her Repatha.  I have never prescribed this for her before.  She has a history of an MI.  She has prediabetes, she has had a stroke before.  Blood pressure today is still high 167/100.  She wants a refill on the Repatha.        Patient Active Problem List    Diagnosis Date Noted    Abdominal pain, RLQ 01/24/2023    Epigastric pain 01/24/2023    Nausea and vomiting 01/24/2023    LLQ pain 01/24/2023    Mixed hyperlipidemia 02/16/2022     Note Last Updated: 2/16/2022     Intolerant of statins - on Repatha      Primary hypertension 02/16/2022    Prediabetes 02/16/2022     Note Last Updated: 2/16/2022     Runs in the family      History of stroke 02/16/2022     Note Last Updated: 2/16/2022 1986 - still with residual left arm problems      History of MI (myocardial infarction) 02/16/2022     Note Last Updated: 2/16/2022 2000 - had a cath in [place] ?      Vitamin D deficiency 02/16/2022    Gastroesophageal " reflux disease without esophagitis 02/16/2022     Note Last Updated: 2/16/2022     Describes LES dysfunction on UGI      Chronic midline low back pain without sciatica 02/16/2022     Note Last Updated: 2/16/2022     Due to compression fracture - no details of exactly where      History of seizure 02/16/2022    Recurrent major depressive disorder, in remission 02/16/2022    Closed wedge compression fracture of T7 vertebra with routine healing 02/16/2022     Note Last Updated: 2/16/2022     Identified on x-ray done 2/16/2022.  Patient reports this happened in late fall 2021.             Past Surgical History:   Procedure Laterality Date    EYE SURGERY      TUBAL ABDOMINAL LIGATION       Current Outpatient Medications on File Prior to Visit   Medication Sig    albuterol (ACCUNEB) 0.63 MG/3ML nebulizer solution Take 3 mL by nebulization Every Night.    albuterol sulfate  (90 Base) MCG/ACT inhaler Inhale 2 puffs Every 4 (Four) Hours As Needed for Wheezing.    aspirin 325 MG tablet Take 1 tablet by mouth Daily.    atenolol-chlorthalidone (TENORETIC) 50-25 MG per tablet Take 1 tablet by mouth Daily.    B-COMPLEX-C PO Take  by mouth.    cloNIDine (CATAPRES) 0.1 MG tablet Take 1 tablet by mouth 2 (Two) Times a Day.    cyclobenzaprine (FLEXERIL) 5 MG tablet Take 1 tablet by mouth 3 (Three) Times a Day As Needed for Muscle Spasms.    esomeprazole (nexIUM) 20 MG capsule Take 1 capsule by mouth Every Morning Before Breakfast.    famotidine (PEPCID) 20 MG tablet Take 1 tablet by mouth At Night As Needed for Heartburn.    hydrOXYzine (ATARAX) 25 MG tablet TAKE 1 TABLET BY MOUTH THREE TIMES DAILY AS NEEDED FOR ITCHING    montelukast (SINGULAIR) 10 MG tablet TAKE 1 TABLET BY MOUTH EVERY NIGHT    naproxen sodium (ALEVE) 220 MG tablet Take 1 tablet by mouth 2 (Two) Times a Day With Meals.    ondansetron ODT (Zofran ODT) 4 MG disintegrating tablet Place 1 tablet on the tongue Every 8 (Eight) Hours As Needed for Nausea or  "Vomiting.    promethazine (PHENERGAN) 12.5 MG tablet Take 1 tablet by mouth Every 6 (Six) Hours As Needed for Nausea or Vomiting.    vitamin D3 125 MCG (5000 UT) capsule capsule Take 1 capsule by mouth Daily.    [DISCONTINUED] Evolocumab (Repatha) solution prefilled syringe injection Inject 1 mL under the skin into the appropriate area as directed Every 14 (Fourteen) Days.     No current facility-administered medications on file prior to visit.     Allergies   Allergen Reactions    Penicillins Swelling    Sulfa Antibiotics Rash    Tetanus Toxoids Rash     Social History     Socioeconomic History    Marital status:    Tobacco Use    Smoking status: Former     Types: Cigarettes     Quit date: 1994     Years since quittin.5     Passive exposure: Past    Smokeless tobacco: Never   Vaping Use    Vaping Use: Never used   Substance and Sexual Activity    Alcohol use: Not Currently    Drug use: Never    Sexual activity: Defer     History reviewed. No pertinent family history.    Review of Systems    Objective   /100 (BP Location: Right arm, Patient Position: Sitting, Cuff Size: Adult)   Pulse 83   Resp 18   Ht 152.4 cm (60\")   Wt 64.4 kg (142 lb)   LMP  (LMP Unknown)   SpO2 96%   Breastfeeding No   BMI 27.73 kg/mý   Physical Exam  Constitutional:       General: She is not in acute distress.     Appearance: She is well-developed.      Comments: Wearing a face mask     HENT:      Head: Normocephalic and atraumatic.   Eyes:      Conjunctiva/sclera: Conjunctivae normal.   Neck:      Vascular: Carotid bruit (left) present.   Cardiovascular:      Rate and Rhythm: Normal rate and regular rhythm.      Heart sounds: Normal heart sounds. No murmur heard.  Pulmonary:      Effort: Pulmonary effort is normal. No respiratory distress.      Breath sounds: Normal breath sounds.   Musculoskeletal:         General: Normal range of motion.      Cervical back: Normal range of motion.      Right lower leg: No " edema.      Left lower leg: No edema.   Skin:     General: Skin is warm and dry.      Findings: No rash.   Neurological:      Mental Status: She is alert and oriented to person, place, and time.   Psychiatric:         Behavior: Behavior normal.         No visits with results within 4 Month(s) from this visit.   Latest known visit with results is:   Office Visit on 01/24/2023   Component Date Value Ref Range Status    SARS Antigen 01/24/2023 Not Detected  Not Detected, Presumptive Negative Final    Influenza A Antigen SUKH 01/24/2023 Not Detected  Not Detected Final    Influenza B Antigen SUKH 01/24/2023 Not Detected  Not Detected Final    Internal Control 01/24/2023 Passed  Passed Final    Lot Number 01/24/2023 [id number]   Final    Expiration Date 01/24/2023 [id number]   Final         Assessment & Plan   Diagnoses and all orders for this visit:    1. Mixed hyperlipidemia (Primary)  -     Evolocumab (Repatha) solution prefilled syringe injection; Inject 1 mL under the skin into the appropriate area as directed Every 14 (Fourteen) Days.  Dispense: 6 mL; Refill: 3  -     Lipid Panel  -     TSH    2. History of MI (myocardial infarction)  -     Evolocumab (Repatha) solution prefilled syringe injection; Inject 1 mL under the skin into the appropriate area as directed Every 14 (Fourteen) Days.  Dispense: 6 mL; Refill: 3  -     TSH    3. Primary hypertension  -     CBC & Differential  -     Comprehensive Metabolic Panel  -     TSH    4. Prediabetes  -     Hemoglobin A1c  -     TSH    5. Vitamin D deficiency  -     Vitamin D,25-Hydroxy  -     TSH    6. History of stroke  -     CT Head Without Contrast; Future  -     Duplex Carotid Ultrasound CAR; Future  -     TSH    7. Bruit of left carotid artery  -     CT Head Without Contrast; Future  -     Duplex Carotid Ultrasound CAR; Future  -     TSH    8. Numbness and tingling in left arm  -     CT Head Without Contrast; Future  -     TSH    9. Hypertensive urgency  -     CT Head  Without Contrast; Future  -     TSH    I will do what I can to help her.  We are going to start her on amlodipine 5 mg a day for better blood pressure control.  I am going to refill Repatha.  We will just have to see what, prior authorization comes back with that.  She is not having any chest pain or shortness of breath.  She is having numbness on the left side of her body.  She has a history of stroke and she had a hypertensive urgency at the ER.  I am going to order the CAT scan of her brain.  I hear a bruit on the left side.  We are going to get carotid artery Dopplers.  Lab work as above.  Follow-up here in a few weeks so we can regroup about all of these things.  I explained to her that I cannot treat her if I see her every year and a half.  Overall, I spent over 45 minutes on the day of service reviewing records from [facility], reviewing records from NP visits here in the office.  Discussing differential diagnosis with her and treatment plan.        Call with any problems or concerns before next visit       Return in about 3 weeks (around 9/20/2023).      Much of this report is an electronic transcription of spoken language to printed text using Dragon dictation software.  As such, the subtleties and finesse of spoken language may permit erroneous, or at times, nonsensical words or phrases to be inadvertently transcribed; thus changes may be made at a later date to rectify these errors.     Alysia Moe MD8/30/202315:36 EDT  This note has been electronically signed

## 2023-08-31 LAB
25(OH)D3+25(OH)D2 SERPL-MCNC: 29.3 NG/ML (ref 30–100)
ALBUMIN SERPL-MCNC: 4.3 G/DL (ref 3.9–4.9)
ALBUMIN/GLOB SERPL: 1.3 {RATIO} (ref 1.2–2.2)
ALP SERPL-CCNC: 111 IU/L (ref 44–121)
ALT SERPL-CCNC: 106 IU/L (ref 0–32)
AST SERPL-CCNC: 154 IU/L (ref 0–40)
BASOPHILS # BLD AUTO: 0.1 X10E3/UL (ref 0–0.2)
BASOPHILS NFR BLD AUTO: 1 %
BILIRUB SERPL-MCNC: 0.4 MG/DL (ref 0–1.2)
BUN SERPL-MCNC: 6 MG/DL (ref 8–27)
BUN/CREAT SERPL: 8 (ref 12–28)
CALCIUM SERPL-MCNC: 9.3 MG/DL (ref 8.7–10.3)
CHLORIDE SERPL-SCNC: 97 MMOL/L (ref 96–106)
CHOLEST SERPL-MCNC: 242 MG/DL (ref 100–199)
CO2 SERPL-SCNC: 24 MMOL/L (ref 20–29)
CREAT SERPL-MCNC: 0.71 MG/DL (ref 0.57–1)
EGFRCR SERPLBLD CKD-EPI 2021: 91 ML/MIN/1.73
EOSINOPHIL # BLD AUTO: 0.2 X10E3/UL (ref 0–0.4)
EOSINOPHIL NFR BLD AUTO: 4 %
ERYTHROCYTE [DISTWIDTH] IN BLOOD BY AUTOMATED COUNT: 12.7 % (ref 11.7–15.4)
GLOBULIN SER CALC-MCNC: 3.4 G/DL (ref 1.5–4.5)
GLUCOSE SERPL-MCNC: 97 MG/DL (ref 70–99)
HBA1C MFR BLD: 5.5 % (ref 4.8–5.6)
HCT VFR BLD AUTO: 40.7 % (ref 34–46.6)
HDLC SERPL-MCNC: 46 MG/DL
HGB BLD-MCNC: 14 G/DL (ref 11.1–15.9)
IMM GRANULOCYTES # BLD AUTO: 0 X10E3/UL (ref 0–0.1)
IMM GRANULOCYTES NFR BLD AUTO: 0 %
LDLC SERPL CALC-MCNC: 161 MG/DL (ref 0–99)
LYMPHOCYTES # BLD AUTO: 1.7 X10E3/UL (ref 0.7–3.1)
LYMPHOCYTES NFR BLD AUTO: 39 %
MCH RBC QN AUTO: 31.7 PG (ref 26.6–33)
MCHC RBC AUTO-ENTMCNC: 34.4 G/DL (ref 31.5–35.7)
MCV RBC AUTO: 92 FL (ref 79–97)
MONOCYTES # BLD AUTO: 0.5 X10E3/UL (ref 0.1–0.9)
MONOCYTES NFR BLD AUTO: 11 %
NEUTROPHILS # BLD AUTO: 1.8 X10E3/UL (ref 1.4–7)
NEUTROPHILS NFR BLD AUTO: 45 %
PLATELET # BLD AUTO: 211 X10E3/UL (ref 150–450)
POTASSIUM SERPL-SCNC: 4.6 MMOL/L (ref 3.5–5.2)
PROT SERPL-MCNC: 7.7 G/DL (ref 6–8.5)
RBC # BLD AUTO: 4.42 X10E6/UL (ref 3.77–5.28)
SODIUM SERPL-SCNC: 139 MMOL/L (ref 134–144)
TRIGL SERPL-MCNC: 188 MG/DL (ref 0–149)
TSH SERPL DL<=0.005 MIU/L-ACNC: 1.75 UIU/ML (ref 0.45–4.5)
VLDLC SERPL CALC-MCNC: 35 MG/DL (ref 5–40)
WBC # BLD AUTO: 4.2 X10E3/UL (ref 3.4–10.8)

## 2023-09-03 NOTE — PROGRESS NOTES
"Subjective   Priscilla Fragoso is a 68 y.o. female.   Chief Complaint   Patient presents with    Prediabetes    Hypertension    Hyperlipidemia       History of Present Illness   Presents to the office today for short interval follow-up on a recent office visit whereby she is reestablishing care.  She has known coronary disease with a history of MI, high blood pressure, high cholesterol, prediabetes, vitamin D deficiency, history of stroke.  We did blood work a few days ago.  We have told her about these over the phone.  Her LFTs were elevated.  AST was 154, ALT was 106.  Bilirubin was normal at 0.4.  Notes that I made the first time I saw her after I reviewed records from previous PCP in June 2021 indicated that her LFTs were over 100 at that time.  Total cholesterol was 288, LDL is 209 and her HDL was 53.    When I saw her a few weeks ago she told me she needed a refill on Repatha.  Verified with her that she had been out of it/had not been out of it before I saw her in late August.    Lipid panel a few days ago showed a total cholesterol of 242, LDL of 161.    A1c was normal at 5.5%, CBC was normal.  Hemoglobin was 14.  GFR was 91.  Electrolytes were normal.  Vitamin D level was okay at 29.3 and TSH was normal at 1.75.    She was hypertensive.  I added amlodipine 5 mg/day.  She took it for 3 days.  Told me it made her head hurt.  She threw up, she got nauseous, dizzy.  Quit taking it.  Started taking 3 of her 0.1 mg clonidine's per day.  Blood pressures 148/76 now.    She had reported numbness on the left side of her body and I did a CT of her brain and carotid artery Dopplers.  These have not been done yet.  Apparently numerous efforts to contact her to get this scheduled at all failed.    She lives with her sister, but tells me she \"got kicked out\" and is now living with her niece.                Patient Active Problem List    Diagnosis Date Noted    Abdominal pain, RLQ 01/24/2023    Epigastric pain 01/24/2023    " Nausea and vomiting 01/24/2023    LLQ pain 01/24/2023    Mixed hyperlipidemia 02/16/2022     Note Last Updated: 2/16/2022     Intolerant of statins - on Repatha      Primary hypertension 02/16/2022    Prediabetes 02/16/2022     Note Last Updated: 2/16/2022     Runs in the family      History of stroke 02/16/2022     Note Last Updated: 2/16/2022     1986 - still with residual left arm problems      History of MI (myocardial infarction) 02/16/2022     Note Last Updated: 2/16/2022 2000 - had a cath in [place] ?      Vitamin D deficiency 02/16/2022    Gastroesophageal reflux disease without esophagitis 02/16/2022     Note Last Updated: 2/16/2022     Describes LES dysfunction on UGI      Chronic midline low back pain without sciatica 02/16/2022     Note Last Updated: 2/16/2022     Due to compression fracture - no details of exactly where      History of seizure 02/16/2022    Recurrent major depressive disorder, in remission 02/16/2022    Closed wedge compression fracture of T7 vertebra with routine healing 02/16/2022     Note Last Updated: 2/16/2022     Identified on x-ray done 2/16/2022.  Patient reports this happened in late fall 2021.             Past Surgical History:   Procedure Laterality Date    EYE SURGERY      TUBAL ABDOMINAL LIGATION       Current Outpatient Medications on File Prior to Visit   Medication Sig    albuterol (ACCUNEB) 0.63 MG/3ML nebulizer solution Take 3 mL by nebulization Every Night.    albuterol sulfate  (90 Base) MCG/ACT inhaler Inhale 2 puffs Every 4 (Four) Hours As Needed for Wheezing.    aspirin 325 MG tablet Take 1 tablet by mouth Daily.    atenolol-chlorthalidone (TENORETIC) 50-25 MG per tablet Take 1 tablet by mouth Daily.    B-COMPLEX-C PO Take  by mouth.    cyclobenzaprine (FLEXERIL) 5 MG tablet Take 1 tablet by mouth 3 (Three) Times a Day As Needed for Muscle Spasms.    esomeprazole (nexIUM) 20 MG capsule Take 1 capsule by mouth Every Morning Before Breakfast.     "Evolocumab (Repatha) solution prefilled syringe injection Inject 1 mL under the skin into the appropriate area as directed Every 14 (Fourteen) Days.    famotidine (PEPCID) 20 MG tablet Take 1 tablet by mouth At Night As Needed for Heartburn.    hydrOXYzine (ATARAX) 25 MG tablet TAKE 1 TABLET BY MOUTH THREE TIMES DAILY AS NEEDED FOR ITCHING    montelukast (SINGULAIR) 10 MG tablet TAKE 1 TABLET BY MOUTH EVERY NIGHT    naproxen sodium (ALEVE) 220 MG tablet Take 1 tablet by mouth 2 (Two) Times a Day With Meals.    ondansetron ODT (Zofran ODT) 4 MG disintegrating tablet Place 1 tablet on the tongue Every 8 (Eight) Hours As Needed for Nausea or Vomiting.    promethazine (PHENERGAN) 12.5 MG tablet Take 1 tablet by mouth Every 6 (Six) Hours As Needed for Nausea or Vomiting.    vitamin D3 125 MCG (5000 UT) capsule capsule Take 1 capsule by mouth Daily.    [DISCONTINUED] amLODIPine (NORVASC) 5 MG tablet TAKE 1 TABLET BY MOUTH DAILY    [DISCONTINUED] cloNIDine (CATAPRES) 0.1 MG tablet Take 1 tablet by mouth 2 (Two) Times a Day.     No current facility-administered medications on file prior to visit.     Allergies   Allergen Reactions    Penicillins Swelling    Sulfa Antibiotics Rash    Tetanus Toxoids Rash     Social History     Socioeconomic History    Marital status:    Tobacco Use    Smoking status: Former     Types: Cigarettes     Quit date: 1994     Years since quittin.5     Passive exposure: Past    Smokeless tobacco: Never   Vaping Use    Vaping Use: Never used   Substance and Sexual Activity    Alcohol use: Not Currently    Drug use: Never    Sexual activity: Defer     History reviewed. No pertinent family history.    Review of Systems    Objective   /76 (BP Location: Right arm, Patient Position: Sitting, Cuff Size: Adult)   Pulse 68   Temp 97.3 °F (36.3 °C) (Infrared)   Resp 18   Ht 152.4 cm (60\")   Wt 64.8 kg (142 lb 12.8 oz)   LMP  (LMP Unknown)   SpO2 96%   Breastfeeding No   BMI " 27.89 kg/m²   Physical Exam  Constitutional:       General: She is not in acute distress.     Appearance: She is well-developed.      Comments: Wearing a face mask     HENT:      Head: Normocephalic and atraumatic.   Eyes:      Conjunctiva/sclera: Conjunctivae normal.   Neck:      Vascular: Carotid bruit (left) present.   Cardiovascular:      Rate and Rhythm: Normal rate and regular rhythm.      Heart sounds: Normal heart sounds. No murmur heard.  Pulmonary:      Effort: Pulmonary effort is normal. No respiratory distress.      Breath sounds: Normal breath sounds.   Musculoskeletal:         General: Normal range of motion.      Cervical back: Normal range of motion.      Right lower leg: No edema.      Left lower leg: No edema.   Skin:     General: Skin is warm and dry.      Findings: No rash.   Neurological:      Mental Status: She is alert and oriented to person, place, and time.   Psychiatric:         Behavior: Behavior normal.         Office Visit on 08/30/2023   Component Date Value Ref Range Status    Hemoglobin A1C 08/30/2023 5.5  4.8 - 5.6 % Final    Comment:          Prediabetes: 5.7 - 6.4           Diabetes: >6.4           Glycemic control for adults with diabetes: <7.0      WBC 08/30/2023 4.2  3.4 - 10.8 x10E3/uL Final    RBC 08/30/2023 4.42  3.77 - 5.28 x10E6/uL Final    Hemoglobin 08/30/2023 14.0  11.1 - 15.9 g/dL Final    Hematocrit 08/30/2023 40.7  34.0 - 46.6 % Final    MCV 08/30/2023 92  79 - 97 fL Final    MCH 08/30/2023 31.7  26.6 - 33.0 pg Final    MCHC 08/30/2023 34.4  31.5 - 35.7 g/dL Final    RDW 08/30/2023 12.7  11.7 - 15.4 % Final    Platelets 08/30/2023 211  150 - 450 x10E3/uL Final    Neutrophil Rel % 08/30/2023 45  Not Estab. % Final    Lymphocyte Rel % 08/30/2023 39  Not Estab. % Final    Monocyte Rel % 08/30/2023 11  Not Estab. % Final    Eosinophil Rel % 08/30/2023 4  Not Estab. % Final    Basophil Rel % 08/30/2023 1  Not Estab. % Final    Neutrophils Absolute 08/30/2023 1.8  1.4 - 7.0  x10E3/uL Final    Lymphocytes Absolute 08/30/2023 1.7  0.7 - 3.1 x10E3/uL Final    Monocytes Absolute 08/30/2023 0.5  0.1 - 0.9 x10E3/uL Final    Eosinophils Absolute 08/30/2023 0.2  0.0 - 0.4 x10E3/uL Final    Basophils Absolute 08/30/2023 0.1  0.0 - 0.2 x10E3/uL Final    Immature Granulocyte Rel % 08/30/2023 0  Not Estab. % Final    Immature Grans Absolute 08/30/2023 0.0  0.0 - 0.1 x10E3/uL Final    Glucose 08/30/2023 97  70 - 99 mg/dL Final    BUN 08/30/2023 6 (L)  8 - 27 mg/dL Final    Creatinine 08/30/2023 0.71  0.57 - 1.00 mg/dL Final    EGFR Result 08/30/2023 91  >59 mL/min/1.73 Final    BUN/Creatinine Ratio 08/30/2023 8 (L)  12 - 28 Final    Sodium 08/30/2023 139  134 - 144 mmol/L Final    Potassium 08/30/2023 4.6  3.5 - 5.2 mmol/L Final    Chloride 08/30/2023 97  96 - 106 mmol/L Final    Total CO2 08/30/2023 24  20 - 29 mmol/L Final    Calcium 08/30/2023 9.3  8.7 - 10.3 mg/dL Final    Total Protein 08/30/2023 7.7  6.0 - 8.5 g/dL Final    Albumin 08/30/2023 4.3  3.9 - 4.9 g/dL Final    Globulin 08/30/2023 3.4  1.5 - 4.5 g/dL Final    A/G Ratio 08/30/2023 1.3  1.2 - 2.2 Final    Total Bilirubin 08/30/2023 0.4  0.0 - 1.2 mg/dL Final    Alkaline Phosphatase 08/30/2023 111  44 - 121 IU/L Final    AST (SGOT) 08/30/2023 154 (H)  0 - 40 IU/L Final    ALT (SGPT) 08/30/2023 106 (H)  0 - 32 IU/L Final    Total Cholesterol 08/30/2023 242 (H)  100 - 199 mg/dL Final    Triglycerides 08/30/2023 188 (H)  0 - 149 mg/dL Final    HDL Cholesterol 08/30/2023 46  >39 mg/dL Final    VLDL Cholesterol [place] 08/30/2023 35  5 - 40 mg/dL Final    LDL Chol Calc (NIH) 08/30/2023 161 (H)  0 - 99 mg/dL Final    25 Hydroxy, Vitamin D 08/30/2023 29.3 (L)  30.0 - 100.0 ng/mL Final    Comment: Vitamin D deficiency has been defined by the [place] of  [employer]icine and an Endocrine Society practice guideline as a  level of serum 25-OH vitamin D less than 20 ng/mL (1,2).  The Endocrine Society went on to further define vitamin D  insufficiency as a  level between 21 and 29 ng/mL (2).  1. IOM ([employer] [employer]). 2010. Dietary reference     intakes for calcium and D. [place]: The     National Academies Press.  2. Ethan ESQUIVEL, Hema PICKENS, Mellisa DUCKWORTH, et al.     Evaluation, treatment, and prevention of vitamin D     deficiency: an Endocrine Society clinical practice     guideline. JCEM. 2011 Jul; 96(7):1911-30.      TSH 08/30/2023 1.750  0.450 - 4.500 uIU/mL Final         Assessment & Plan   Diagnoses and all orders for this visit:    1. Primary hypertension (Primary)  -     cloNIDine (CATAPRES) 0.2 MG tablet; Take 1 tablet by mouth 2 (Two) Times a Day.  Dispense: 60 tablet; Refill: 5    2. Mixed hyperlipidemia    3. Prediabetes    4. Vitamin D deficiency    5. History of stroke    6. History of MI (myocardial infarction)    7. Numbness and tingling in left arm    Unknown what really happened with the amlodipine.  She tells me today that she had a stroke when she was a child.  She could not speak, could not move her left arm.  Her grandmother gave her instructions and everything got better.  We will increase her clonidine to 0.2 mg twice a day.  We will try to get these tests scheduled that I ordered at the last visit.  It appears that she has had chronically elevated LFTs.  We will need to do a liver work-up sometime soon.    Plan to repeat her liver tests at the next visit and if they are still elevated,  We will do viral hepatitis panel, ferritin level, ultrasound of her liver.    Cholesterol level -back on Repatha.  We will check her cholesterol level again in 4 to 6 months.  Overall, status of multiple problems reviewed today as above.  Psychosocial factors make this more complicated since her home situation is varying in communicating with her outside of office visits is unpredictable.  Prescription medicines changed as above.    Call with any problems or concerns before next visit       Return in about 4 weeks (around 10/13/2023), or  PROVIDED ct / dopplers are done.      Much of this report is an electronic transcription of spoken language to printed text using Dragon dictation software.  As such, the subtleties and finesse of spoken language may permit erroneous, or at times, nonsensical words or phrases to be inadvertently transcribed; thus changes may be made at a later date to rectify these errors.     Alysia Moe [id number] EDT  This note has been electronically signed

## 2023-09-05 ENCOUNTER — TELEPHONE (OUTPATIENT)
Dept: FAMILY MEDICINE CLINIC | Facility: CLINIC | Age: 68
End: 2023-09-05
Payer: MEDICARE

## 2023-09-05 NOTE — TELEPHONE ENCOUNTER
HUB TO RELAY     NO ANSWER AND MAIL BOX FULL      Tell her that her blood work from last week showed that her liver tests were still high.  I reviewed the records from her previous doctor and her liver tests were over 100 at that point as well.  Please ask if she has ever been told if she has liver problems.   Her cholesterol was still quite high.  Ask her if she had been out of her Repatha before I saw her last week.  The remainder of her test showed a good vitamin D level.  No diabetes.  She was not anemic and her kidney function was normal.

## 2023-09-06 NOTE — TELEPHONE ENCOUNTER
Thanks for the follow-up.  I will explained to her the work-up for unexplained elevated liver enzymes at our follow-up visit.

## 2023-09-06 NOTE — TELEPHONE ENCOUNTER
Spoke with patient voiced she has never been told she has liver issues. She has a follow up on the 15 and will discuss that with you than. She had been out of her repatha for a little but she was able to get it and do her shot the other day so she is back on track with that.

## 2023-09-14 RX ORDER — HYDROXYZINE HYDROCHLORIDE 25 MG/1
TABLET, FILM COATED ORAL
Qty: 90 TABLET | Refills: 0 | Status: SHIPPED | OUTPATIENT
Start: 2023-09-14

## 2023-09-15 ENCOUNTER — OFFICE VISIT (OUTPATIENT)
Dept: FAMILY MEDICINE CLINIC | Facility: CLINIC | Age: 68
End: 2023-09-15
Payer: MEDICARE

## 2023-09-15 VITALS
BODY MASS INDEX: 28.03 KG/M2 | OXYGEN SATURATION: 96 % | TEMPERATURE: 97.3 F | RESPIRATION RATE: 18 BRPM | SYSTOLIC BLOOD PRESSURE: 148 MMHG | HEIGHT: 60 IN | DIASTOLIC BLOOD PRESSURE: 76 MMHG | HEART RATE: 68 BPM | WEIGHT: 142.8 LBS

## 2023-09-15 DIAGNOSIS — I10 PRIMARY HYPERTENSION: Primary | ICD-10-CM

## 2023-09-15 DIAGNOSIS — I25.2 HISTORY OF MI (MYOCARDIAL INFARCTION): ICD-10-CM

## 2023-09-15 DIAGNOSIS — R20.2 NUMBNESS AND TINGLING IN LEFT ARM: ICD-10-CM

## 2023-09-15 DIAGNOSIS — R73.03 PREDIABETES: ICD-10-CM

## 2023-09-15 DIAGNOSIS — I10 PRIMARY HYPERTENSION: ICD-10-CM

## 2023-09-15 DIAGNOSIS — Z86.73 HISTORY OF STROKE: ICD-10-CM

## 2023-09-15 DIAGNOSIS — E78.2 MIXED HYPERLIPIDEMIA: ICD-10-CM

## 2023-09-15 DIAGNOSIS — E55.9 VITAMIN D DEFICIENCY: ICD-10-CM

## 2023-09-15 DIAGNOSIS — R20.0 NUMBNESS AND TINGLING IN LEFT ARM: ICD-10-CM

## 2023-09-15 PROCEDURE — 1159F MED LIST DOCD IN RCRD: CPT | Performed by: FAMILY MEDICINE

## 2023-09-15 PROCEDURE — 99214 OFFICE O/P EST MOD 30 MIN: CPT | Performed by: FAMILY MEDICINE

## 2023-09-15 PROCEDURE — 3077F SYST BP >= 140 MM HG: CPT | Performed by: FAMILY MEDICINE

## 2023-09-15 PROCEDURE — 3078F DIAST BP <80 MM HG: CPT | Performed by: FAMILY MEDICINE

## 2023-09-15 PROCEDURE — 1160F RVW MEDS BY RX/DR IN RCRD: CPT | Performed by: FAMILY MEDICINE

## 2023-09-15 RX ORDER — CLONIDINE HYDROCHLORIDE 0.2 MG/1
TABLET ORAL
Qty: 180 TABLET | Refills: 0 | Status: SHIPPED | OUTPATIENT
Start: 2023-09-15

## 2023-09-15 RX ORDER — CLONIDINE HYDROCHLORIDE 0.2 MG/1
0.2 TABLET ORAL 2 TIMES DAILY
Qty: 60 TABLET | Refills: 5 | Status: SHIPPED | OUTPATIENT
Start: 2023-09-15 | End: 2023-09-15

## 2023-10-09 ENCOUNTER — HOSPITAL ENCOUNTER (OUTPATIENT)
Dept: CARDIOLOGY | Facility: HOSPITAL | Age: 68
Discharge: HOME | End: 2023-10-09
Payer: MEDICARE

## 2023-10-09 ENCOUNTER — HOSPITAL ENCOUNTER (OUTPATIENT)
Dept: CT IMAGING | Facility: HOSPITAL | Age: 68
Discharge: HOME | End: 2023-10-09
Payer: MEDICARE

## 2023-10-09 DIAGNOSIS — I16.0 HYPERTENSIVE URGENCY: ICD-10-CM

## 2023-10-09 DIAGNOSIS — R09.89 BRUIT OF LEFT CAROTID ARTERY: ICD-10-CM

## 2023-10-09 DIAGNOSIS — R20.0 NUMBNESS AND TINGLING IN LEFT ARM: ICD-10-CM

## 2023-10-09 DIAGNOSIS — R20.2 NUMBNESS AND TINGLING IN LEFT ARM: ICD-10-CM

## 2023-10-09 DIAGNOSIS — Z86.73 HISTORY OF STROKE: ICD-10-CM

## 2023-10-09 LAB
BH CV XLRA MEAS LEFT DIST CCA EDV: -19.6 CM/SEC
BH CV XLRA MEAS LEFT DIST CCA PSV: -72.9 CM/SEC
BH CV XLRA MEAS LEFT DIST ICA EDV: -30.6 CM/SEC
BH CV XLRA MEAS LEFT DIST ICA PSV: -98 CM/SEC
BH CV XLRA MEAS LEFT ICA/CCA RATIO: -1.45
BH CV XLRA MEAS LEFT PROX CCA EDV: 17.5 CM/SEC
BH CV XLRA MEAS LEFT PROX CCA PSV: 87.6 CM/SEC
BH CV XLRA MEAS LEFT PROX ECA PSV: -87.6 CM/SEC
BH CV XLRA MEAS LEFT PROX ICA EDV: -45.1 CM/SEC
BH CV XLRA MEAS LEFT PROX ICA PSV: -127 CM/SEC
BH CV XLRA MEAS LEFT PROX SCLA PSV: 150 CM/SEC
BH CV XLRA MEAS LEFT VERTEBRAL A PSV: 62.9 CM/SEC
BH CV XLRA MEAS RIGHT DIST CCA EDV: 17.4 CM/SEC
BH CV XLRA MEAS RIGHT DIST CCA PSV: 64 CM/SEC
BH CV XLRA MEAS RIGHT DIST ICA EDV: -32.9 CM/SEC
BH CV XLRA MEAS RIGHT DIST ICA PSV: -96.7 CM/SEC
BH CV XLRA MEAS RIGHT ICA/CCA RATIO: -3
BH CV XLRA MEAS RIGHT PROX CCA EDV: 13 CM/SEC
BH CV XLRA MEAS RIGHT PROX CCA PSV: 70.2 CM/SEC
BH CV XLRA MEAS RIGHT PROX ECA PSV: -148 CM/SEC
BH CV XLRA MEAS RIGHT PROX ICA EDV: -61.7 CM/SEC
BH CV XLRA MEAS RIGHT PROX ICA PSV: -209 CM/SEC
BH CV XLRA MEAS RIGHT PROX SCLA PSV: 126 CM/SEC
BH CV XLRA MEAS RIGHT VERTEBRAL A PSV: -60.9 CM/SEC

## 2023-10-09 PROCEDURE — 93880 EXTRACRANIAL BILAT STUDY: CPT

## 2023-10-09 PROCEDURE — 70450 CT HEAD/BRAIN W/O DYE: CPT

## 2023-10-11 ENCOUNTER — TELEPHONE (OUTPATIENT)
Dept: FAMILY MEDICINE CLINIC | Facility: CLINIC | Age: 68
End: 2023-10-11
Payer: MEDICARE

## 2023-10-11 NOTE — TELEPHONE ENCOUNTER
HUB TO RELAY     LEFT MESSAGE TO RETURN CALL       Please tell Priscilla that the CAT scan of the brain did not show any evidence of her previous stroke.  She has a partially blocked right carotid artery and left carotid artery does not have significant blockage.   The plan was to see her back after these tests have been.  They are done.  Please ask her to schedule within the next week or so for follow-up

## 2023-10-16 RX ORDER — HYDROXYZINE HYDROCHLORIDE 25 MG/1
TABLET, FILM COATED ORAL
Qty: 90 TABLET | Refills: 0 | Status: SHIPPED | OUTPATIENT
Start: 2023-10-16

## 2023-11-13 DIAGNOSIS — I10 PRIMARY HYPERTENSION: ICD-10-CM

## 2023-11-13 RX ORDER — HYDROXYZINE HYDROCHLORIDE 25 MG/1
TABLET, FILM COATED ORAL
Qty: 90 TABLET | Refills: 0 | Status: SHIPPED | OUTPATIENT
Start: 2023-11-13

## 2023-11-13 RX ORDER — AMLODIPINE BESYLATE 5 MG/1
5 TABLET ORAL DAILY
Qty: 90 TABLET | OUTPATIENT
Start: 2023-11-13

## 2023-11-20 ENCOUNTER — TELEPHONE (OUTPATIENT)
Dept: FAMILY MEDICINE CLINIC | Facility: CLINIC | Age: 68
End: 2023-11-20
Payer: MEDICARE

## 2023-11-20 NOTE — TELEPHONE ENCOUNTER
Pt tested positive yesterday for Covid.  She is chilling, body aches and very congested.  Pt is having pain with breathing if she's laying down, but does have COPD.  Pt is taking Aleve for the body aches.      Pt didn't know if there was anything else she should do?  Is there anything that she can take for the other symptoms?      Pt uses [facility] in [place]

## 2023-11-20 NOTE — TELEPHONE ENCOUNTER
She should go to the emergency room to be evaluated.  Having COPD and having chest pain with breathing could mean a more significant lung problem.  The ER is the only place to get that checked out safely.  Thanks!

## 2023-12-13 RX ORDER — HYDROXYZINE HYDROCHLORIDE 25 MG/1
TABLET, FILM COATED ORAL
Qty: 90 TABLET | Refills: 0 | Status: SHIPPED | OUTPATIENT
Start: 2023-12-13

## 2024-01-09 RX ORDER — HYDROXYZINE HYDROCHLORIDE 25 MG/1
TABLET, FILM COATED ORAL
Qty: 90 TABLET | Refills: 0 | Status: SHIPPED | OUTPATIENT
Start: 2024-01-09

## 2024-01-09 RX ORDER — MONTELUKAST SODIUM 10 MG/1
10 TABLET ORAL NIGHTLY
Qty: 90 TABLET | Refills: 1 | Status: SHIPPED | OUTPATIENT
Start: 2024-01-09

## 2024-01-26 ENCOUNTER — TELEPHONE (OUTPATIENT)
Dept: FAMILY MEDICINE CLINIC | Facility: CLINIC | Age: 69
End: 2024-01-26
Payer: MEDICARE

## 2024-02-05 ENCOUNTER — OFFICE VISIT (OUTPATIENT)
Dept: FAMILY MEDICINE CLINIC | Facility: CLINIC | Age: 69
End: 2024-02-05
Payer: MEDICARE

## 2024-02-05 VITALS
OXYGEN SATURATION: 94 % | DIASTOLIC BLOOD PRESSURE: 76 MMHG | HEART RATE: 72 BPM | TEMPERATURE: 96.9 F | BODY MASS INDEX: 28.78 KG/M2 | SYSTOLIC BLOOD PRESSURE: 116 MMHG | WEIGHT: 146.6 LBS | HEIGHT: 60 IN | RESPIRATION RATE: 18 BRPM

## 2024-02-05 DIAGNOSIS — I10 PRIMARY HYPERTENSION: Primary | ICD-10-CM

## 2024-02-05 DIAGNOSIS — F41.9 ANXIETY: ICD-10-CM

## 2024-02-05 DIAGNOSIS — M54.42 CHRONIC MIDLINE LOW BACK PAIN WITH BILATERAL SCIATICA: ICD-10-CM

## 2024-02-05 DIAGNOSIS — G89.29 CHRONIC MIDLINE LOW BACK PAIN WITHOUT SCIATICA: ICD-10-CM

## 2024-02-05 DIAGNOSIS — I67.89 CEREBRAL MICROVASCULAR DISEASE: ICD-10-CM

## 2024-02-05 DIAGNOSIS — M54.41 CHRONIC MIDLINE LOW BACK PAIN WITH BILATERAL SCIATICA: ICD-10-CM

## 2024-02-05 DIAGNOSIS — E55.9 VITAMIN D DEFICIENCY: ICD-10-CM

## 2024-02-05 DIAGNOSIS — R73.03 PREDIABETES: ICD-10-CM

## 2024-02-05 DIAGNOSIS — E78.2 MIXED HYPERLIPIDEMIA: ICD-10-CM

## 2024-02-05 DIAGNOSIS — R09.89 BRUIT OF LEFT CAROTID ARTERY: ICD-10-CM

## 2024-02-05 DIAGNOSIS — G89.29 CHRONIC MIDLINE LOW BACK PAIN WITH BILATERAL SCIATICA: ICD-10-CM

## 2024-02-05 DIAGNOSIS — M54.50 CHRONIC MIDLINE LOW BACK PAIN WITHOUT SCIATICA: ICD-10-CM

## 2024-02-05 DIAGNOSIS — K21.9 GASTROESOPHAGEAL REFLUX DISEASE WITHOUT ESOPHAGITIS: ICD-10-CM

## 2024-02-05 DIAGNOSIS — S22.060D CLOSED WEDGE COMPRESSION FRACTURE OF T7 VERTEBRA WITH ROUTINE HEALING: ICD-10-CM

## 2024-02-05 PROCEDURE — 1159F MED LIST DOCD IN RCRD: CPT | Performed by: FAMILY MEDICINE

## 2024-02-05 PROCEDURE — 99214 OFFICE O/P EST MOD 30 MIN: CPT | Performed by: FAMILY MEDICINE

## 2024-02-05 PROCEDURE — 3074F SYST BP LT 130 MM HG: CPT | Performed by: FAMILY MEDICINE

## 2024-02-05 PROCEDURE — 1160F RVW MEDS BY RX/DR IN RCRD: CPT | Performed by: FAMILY MEDICINE

## 2024-02-05 PROCEDURE — 3078F DIAST BP <80 MM HG: CPT | Performed by: FAMILY MEDICINE

## 2024-02-05 RX ORDER — HYDROXYZINE HYDROCHLORIDE 25 MG/1
25 TABLET, FILM COATED ORAL 3 TIMES DAILY PRN
Qty: 90 TABLET | Refills: 1 | Status: SHIPPED | OUTPATIENT
Start: 2024-02-05

## 2024-02-05 RX ORDER — CYCLOBENZAPRINE HCL 5 MG
5 TABLET ORAL 3 TIMES DAILY PRN
Qty: 90 TABLET | Refills: 1 | Status: SHIPPED | OUTPATIENT
Start: 2024-02-05

## 2024-02-05 NOTE — PROGRESS NOTES
Subjective   Priscilla Fragoso is a 68 y.o. female.   Chief Complaint   Patient presents with    Prediabetes    Hypertension    Hyperlipidemia       History of Present Illness   Presents to the office today because apparently someone talked her into a Medicare wellness.  I have not seen her since September of last year.  Has been lost to follow-up twice.  She had extensive testing done back then I been trying to follow-up with her about.  She came to the office visit late.  She has known history of a T7 compression fracture.  Tells me she broke her sacrum once in the past as well.  C/o burning , numbness in both legs - awaken her at night.      GERD - Has bad heartburn and reflux if she does not take Nexium daily.  She needs a refill on that.  She tells me that she cannot take pills because she throws everything up.    HLD - has Repatha.  Taking that.    No strokelike symptoms.  I explained to her the findings on her CAT scan and carotid artery ultrasound.          Patient Active Problem List    Diagnosis Date Noted    Cerebral microvascular disease 02/05/2024    Abdominal pain, RLQ 01/24/2023    Epigastric pain 01/24/2023    Nausea and vomiting 01/24/2023    LLQ pain 01/24/2023    Mixed hyperlipidemia 02/16/2022     Note Last Updated: 2/16/2022     Intolerant of statins - on Repatha      Primary hypertension 02/16/2022    Prediabetes 02/16/2022     Note Last Updated: 2/16/2022     Runs in the family      History of stroke 02/16/2022     Note Last Updated: 2/16/2022 1986 - still with residual left arm problems      History of MI (myocardial infarction) 02/16/2022     Note Last Updated: 2/16/2022 2000 - had a cath in [place] ?      Vitamin D deficiency 02/16/2022    Gastroesophageal reflux disease without esophagitis 02/16/2022     Note Last Updated: 2/16/2022     Describes LES dysfunction on UGI      Chronic midline low back pain with bilateral sciatica 02/16/2022     Note Last Updated: 2/16/2022     Due to  compression fracture - no details of exactly where      History of seizure 02/16/2022    Recurrent major depressive disorder, in remission 02/16/2022    Closed wedge compression fracture of T7 vertebra with routine healing 02/16/2022     Note Last Updated: 2/16/2022     Identified on x-ray done 2/16/2022.  Patient reports this happened in late fall 2021.             Past Surgical History:   Procedure Laterality Date    EYE SURGERY      TUBAL ABDOMINAL LIGATION       Current Outpatient Medications on File Prior to Visit   Medication Sig    albuterol (ACCUNEB) 0.63 MG/3ML nebulizer solution Take 3 mL by nebulization Every Night.    albuterol sulfate  (90 Base) MCG/ACT inhaler Inhale 2 puffs Every 4 (Four) Hours As Needed for Wheezing.    aspirin 325 MG tablet Take 1 tablet by mouth Daily.    atenolol-chlorthalidone (TENORETIC) 50-25 MG per tablet Take 1 tablet by mouth Daily.    B-COMPLEX-C PO Take  by mouth.    cloNIDine (CATAPRES) 0.2 MG tablet TAKE 1 TABLET BY MOUTH TWICE DAILY    Evolocumab (Repatha) solution prefilled syringe injection Inject 1 mL under the skin into the appropriate area as directed Every 14 (Fourteen) Days.    famotidine (PEPCID) 20 MG tablet Take 1 tablet by mouth At Night As Needed for Heartburn.    montelukast (SINGULAIR) 10 MG tablet TAKE 1 TABLET BY MOUTH EVERY NIGHT    naproxen sodium (ALEVE) 220 MG tablet Take 1 tablet by mouth 2 (Two) Times a Day With Meals.    ondansetron ODT (Zofran ODT) 4 MG disintegrating tablet Place 1 tablet on the tongue Every 8 (Eight) Hours As Needed for Nausea or Vomiting.    promethazine (PHENERGAN) 12.5 MG tablet Take 1 tablet by mouth Every 6 (Six) Hours As Needed for Nausea or Vomiting.    [DISCONTINUED] cyclobenzaprine (FLEXERIL) 5 MG tablet Take 1 tablet by mouth 3 (Three) Times a Day As Needed for Muscle Spasms.    [DISCONTINUED] esomeprazole (nexIUM) 20 MG capsule Take 1 capsule by mouth Every Morning Before Breakfast.    [DISCONTINUED]  "hydrOXYzine (ATARAX) 25 MG tablet TAKE 1 TABLET BY MOUTH THREE TIMES DAILY AS NEEDED FOR ITCHING    [DISCONTINUED] vitamin D3 125 MCG (5000 UT) capsule capsule Take 1 capsule by mouth Daily.     No current facility-administered medications on file prior to visit.     Allergies   Allergen Reactions    Penicillins Swelling    Sulfa Antibiotics Rash    Tetanus Toxoids Rash     Social History     Socioeconomic History    Marital status:    Tobacco Use    Smoking status: Former     Packs/day: 3.00     Years: 15.00     Additional pack years: 0.00     Total pack years: 45.00     Types: Cigarettes     Start date:      Quit date: 1994     Years since quittin.9     Passive exposure: Past    Smokeless tobacco: Never   Vaping Use    Vaping Use: Never used   Substance and Sexual Activity    Alcohol use: Not Currently    Drug use: Never    Sexual activity: Defer     History reviewed. No pertinent family history.    Review of Systems    Objective   /76 (BP Location: Left arm, Patient Position: Sitting, Cuff Size: Adult)   Pulse 72   Temp 96.9 °F (36.1 °C) (Infrared)   Resp 18   Ht 152.4 cm (60\")   Wt 66.5 kg (146 lb 9.6 oz)   LMP  (LMP Unknown)   SpO2 94%   Breastfeeding No   BMI 28.63 kg/m²   Physical Exam  Constitutional:       Appearance: She is well-developed.      Comments:      HENT:      Head: Normocephalic and atraumatic.   Eyes:      Conjunctiva/sclera: Conjunctivae normal.   Cardiovascular:      Rate and Rhythm: Normal rate.   Pulmonary:      Effort: Pulmonary effort is normal.   Musculoskeletal:         General: Normal range of motion.      Cervical back: Normal range of motion.   Skin:     General: Skin is warm and dry.      Findings: No rash.   Neurological:      Mental Status: She is alert and oriented to person, place, and time.   Psychiatric:         Behavior: Behavior normal.           Hospital Outpatient Visit on 10/09/2023   Component Date Value Ref Range Status    Prox CCA " PSV 10/09/2023 70.2  cm/sec Final    Prox CCA EDV 10/09/2023 13.0  cm/sec Final    Dist CCA PSV 10/09/2023 64.0  cm/sec Final    Dist CCA EDV 10/09/2023 17.4  cm/sec Final    Prox ICA PSV 10/09/2023 -209.0  cm/sec Final    Prox ICA EDV 10/09/2023 -61.7  cm/sec Final    Dist ICA PSV 10/09/2023 -96.7  cm/sec Final    Dist ICA EDV 10/09/2023 -32.9  cm/sec Final    Prox ECA PSV 10/09/2023 -148.0  cm/sec Final    Vertebral A PSV 10/09/2023 -60.9  cm/sec Final    Prox SCLA PSV 10/09/2023 126.0  cm/sec Final    ICA/CCA ratio 10/09/2023 -3.0   Final    Prox CCA PSV 10/09/2023 87.6  cm/sec Final    Prox CCA EDV 10/09/2023 17.5  cm/sec Final    Dist CCA PSV 10/09/2023 -72.9  cm/sec Final    Dist CCA EDV 10/09/2023 -19.6  cm/sec Final    Prox ICA PSV 10/09/2023 -127.0  cm/sec Final    Prox ICA EDV 10/09/2023 -45.1  cm/sec Final    Dist ICA PSV 10/09/2023 -98.0  cm/sec Final    Dist ICA EDV 10/09/2023 -30.6  cm/sec Final    Prox ECA PSV 10/09/2023 -87.6  cm/sec Final    Vertebral A PSV 10/09/2023 62.9  cm/sec Final    Prox SCLA PSV 10/09/2023 150.0  cm/sec Final    ICA/CCA ratio 10/09/2023 -1.45   Final            Assessment & Plan   Diagnoses and all orders for this visit:    1. Primary hypertension (Primary)  -     Comprehensive Metabolic Panel  -     CBC & Differential    2. Mixed hyperlipidemia  -     Lipid Panel    3. Prediabetes  -     Hemoglobin A1c    4. Vitamin D deficiency  -     Vitamin D,25-Hydroxy  -     vitamin D3 125 MCG (5000 UT) capsule capsule; Take 1 capsule by mouth Daily.  Dispense: 90 capsule; Refill: 1    5. Gastroesophageal reflux disease without esophagitis  -     esomeprazole (nexIUM) 20 MG capsule; Take 1 capsule by mouth Every Morning Before Breakfast.  Dispense: 90 capsule; Refill: 3    6. Chronic midline low back pain with bilateral sciatica  -     XR Spine Lumbar 2 or 3 View; Future    7. Closed wedge compression fracture of T7 vertebra with routine healing  -     XR Spine Thoracic 2 View (In  Office); Future    8. Cerebral microvascular disease    9. Chronic midline low back pain without sciatica  -     cyclobenzaprine (FLEXERIL) 5 MG tablet; Take 1 tablet by mouth 3 (Three) Times a Day As Needed for Muscle Spasms.  Dispense: 90 tablet; Refill: 1    10. Bruit of left carotid artery    11. Anxiety  -     hydrOXYzine (ATARAX) 25 MG tablet; Take 1 tablet by mouth 3 (Three) Times a Day As Needed for Itching.  Dispense: 90 tablet; Refill: 1    Status of extensive medical problems reviewed today.  I put a prefaced all of that and told her that I have to see her on a routine basis to be able to manage this many medical problems, let alone give her explanations and treatments for other additional complaints such as her back pain and numbness and burning in her feet that occur from the mid shins distally.  She does not have diabetes that we know of, but she does have history of prediabetes.  She needs lab work.  Unknown if she has a radicular component she does tell me when her legs hurt her back also hurt.  We need to update the x-rays of her lumbar and thoracic spine.  I will the need to talk to her specifically about these problems and we need to decide whether therapy would be an option for her.  She has been on Flexeril for over a year due to muscle spasms.  Her blood pressure is excellent at 116/76.  I am refilling medicine she needs refills on as above.  We are going to get labs to follow status of chronic issues as above.  Will get the x-rays to see what her back looks like and I will see her back in a few weeks to go over all this.        Call with any problems or concerns before next visit       Return in about 4 weeks (around 3/4/2024), or recheck legs, back, labs.      Much of this report is an electronic transcription of spoken language to printed text using Dragon dictation software.  As such, the subtleties and finesse of spoken language may permit erroneous, or at times, nonsensical words or  phrases to be inadvertently transcribed; thus changes may be made at a later date to rectify these errors.     Alysia Moe MD2/5/202409:39 EST  This note has been electronically signed

## 2024-02-06 LAB
25(OH)D3+25(OH)D2 SERPL-MCNC: 21.1 NG/ML (ref 30–100)
ALBUMIN SERPL-MCNC: 3.4 G/DL (ref 3.9–4.9)
ALBUMIN/GLOB SERPL: 0.9 {RATIO} (ref 1.2–2.2)
ALP SERPL-CCNC: 90 IU/L (ref 44–121)
ALT SERPL-CCNC: 27 IU/L (ref 0–32)
AST SERPL-CCNC: 88 IU/L (ref 0–40)
BASOPHILS # BLD AUTO: 0.1 X10E3/UL (ref 0–0.2)
BASOPHILS NFR BLD AUTO: 1 %
BILIRUB SERPL-MCNC: 0.5 MG/DL (ref 0–1.2)
BUN SERPL-MCNC: 5 MG/DL (ref 8–27)
BUN/CREAT SERPL: 6 (ref 12–28)
CALCIUM SERPL-MCNC: 8.8 MG/DL (ref 8.7–10.3)
CHLORIDE SERPL-SCNC: 104 MMOL/L (ref 96–106)
CHOLEST SERPL-MCNC: 171 MG/DL (ref 100–199)
CO2 SERPL-SCNC: 23 MMOL/L (ref 20–29)
CREAT SERPL-MCNC: 0.81 MG/DL (ref 0.57–1)
EGFRCR SERPLBLD CKD-EPI 2021: 78 ML/MIN/1.73
EOSINOPHIL # BLD AUTO: 0.2 X10E3/UL (ref 0–0.4)
EOSINOPHIL NFR BLD AUTO: 3 %
ERYTHROCYTE [DISTWIDTH] IN BLOOD BY AUTOMATED COUNT: 12.4 % (ref 11.7–15.4)
GLOBULIN SER CALC-MCNC: 3.8 G/DL (ref 1.5–4.5)
GLUCOSE SERPL-MCNC: 125 MG/DL (ref 70–99)
HBA1C MFR BLD: 5.4 % (ref 4.8–5.6)
HCT VFR BLD AUTO: 40.2 % (ref 34–46.6)
HDLC SERPL-MCNC: 24 MG/DL
HGB BLD-MCNC: 13.4 G/DL (ref 11.1–15.9)
IMM GRANULOCYTES # BLD AUTO: 0 X10E3/UL (ref 0–0.1)
IMM GRANULOCYTES NFR BLD AUTO: 0 %
LDLC SERPL CALC-MCNC: 112 MG/DL (ref 0–99)
LYMPHOCYTES # BLD AUTO: 1.7 X10E3/UL (ref 0.7–3.1)
LYMPHOCYTES NFR BLD AUTO: 30 %
MCH RBC QN AUTO: 31.5 PG (ref 26.6–33)
MCHC RBC AUTO-ENTMCNC: 33.3 G/DL (ref 31.5–35.7)
MCV RBC AUTO: 95 FL (ref 79–97)
MONOCYTES # BLD AUTO: 0.6 X10E3/UL (ref 0.1–0.9)
MONOCYTES NFR BLD AUTO: 10 %
NEUTROPHILS # BLD AUTO: 3.2 X10E3/UL (ref 1.4–7)
NEUTROPHILS NFR BLD AUTO: 56 %
PLATELET # BLD AUTO: 198 X10E3/UL (ref 150–450)
POTASSIUM SERPL-SCNC: 4.3 MMOL/L (ref 3.5–5.2)
PROT SERPL-MCNC: 7.2 G/DL (ref 6–8.5)
RBC # BLD AUTO: 4.25 X10E6/UL (ref 3.77–5.28)
SODIUM SERPL-SCNC: 145 MMOL/L (ref 134–144)
TRIGL SERPL-MCNC: 195 MG/DL (ref 0–149)
VLDLC SERPL CALC-MCNC: 35 MG/DL (ref 5–40)
WBC # BLD AUTO: 5.8 X10E3/UL (ref 3.4–10.8)

## 2024-02-08 DIAGNOSIS — K21.9 GASTROESOPHAGEAL REFLUX DISEASE WITHOUT ESOPHAGITIS: ICD-10-CM

## 2024-02-08 DIAGNOSIS — F41.9 ANXIETY: ICD-10-CM

## 2024-02-08 RX ORDER — HYDROXYZINE HYDROCHLORIDE 25 MG/1
25 TABLET, FILM COATED ORAL 3 TIMES DAILY PRN
Qty: 90 TABLET | Refills: 1 | OUTPATIENT
Start: 2024-02-08

## 2024-02-09 ENCOUNTER — TELEPHONE (OUTPATIENT)
Dept: FAMILY MEDICINE CLINIC | Facility: CLINIC | Age: 69
End: 2024-02-09
Payer: MEDICARE

## 2024-02-12 DIAGNOSIS — I10 PRIMARY HYPERTENSION: ICD-10-CM

## 2024-02-13 RX ORDER — AMLODIPINE BESYLATE 5 MG/1
5 TABLET ORAL DAILY
Qty: 90 TABLET | Refills: 0 | OUTPATIENT
Start: 2024-02-13

## 2024-03-09 DIAGNOSIS — I10 PRIMARY HYPERTENSION: ICD-10-CM

## 2024-03-09 DIAGNOSIS — K21.9 GASTROESOPHAGEAL REFLUX DISEASE WITHOUT ESOPHAGITIS: ICD-10-CM

## 2024-03-11 RX ORDER — FAMOTIDINE 20 MG/1
20 TABLET, FILM COATED ORAL NIGHTLY PRN
Qty: 30 TABLET | Refills: 1 | Status: SHIPPED | OUTPATIENT
Start: 2024-03-11

## 2024-03-11 RX ORDER — CLONIDINE HYDROCHLORIDE 0.1 MG/1
0.1 TABLET ORAL 2 TIMES DAILY
Qty: 60 TABLET | Refills: 1 | Status: SHIPPED | OUTPATIENT
Start: 2024-03-11

## 2024-03-11 RX ORDER — ATENOLOL AND CHLORTHALIDONE TABLET 50; 25 MG/1; MG/1
1 TABLET ORAL DAILY
Qty: 30 TABLET | Refills: 1 | Status: SHIPPED | OUTPATIENT
Start: 2024-03-11

## 2024-04-06 DIAGNOSIS — F41.9 ANXIETY: ICD-10-CM

## 2024-04-08 RX ORDER — HYDROXYZINE HYDROCHLORIDE 25 MG/1
25 TABLET, FILM COATED ORAL 3 TIMES DAILY PRN
Qty: 90 TABLET | Refills: 0 | Status: SHIPPED | OUTPATIENT
Start: 2024-04-08

## 2024-04-16 ENCOUNTER — NURSE TRIAGE (OUTPATIENT)
Dept: CALL CENTER | Facility: HOSPITAL | Age: 69
End: 2024-04-16
Payer: MEDICARE

## 2024-04-16 NOTE — TELEPHONE ENCOUNTER
"Reason for Disposition   Age > 50 years    Additional Information   Negative: Shock suspected (e.g., cold/pale/clammy skin, too weak to stand, low BP, rapid pulse)   Negative: Difficult to awaken or acting confused (e.g., disoriented, slurred speech)   Negative: Passed out (i.e., lost consciousness, collapsed and was not responding)   Negative: [1] Vomiting AND [2] contains red blood or black (\"coffee ground\") material  (Exception: Few red streaks in vomit that only happened once.)   Negative: Sounds like a life-threatening emergency to the triager   Negative: Diarrhea is main symptom   Negative: Stool color other than brown or tan is main concern  (no bleeding and no melena)   Negative: SEVERE rectal bleeding (large blood clots; constant or on and off bleeding)   Negative: SEVERE dizziness (e.g., unable to stand, requires support to walk, feels like passing out now)   Negative: [1] MODERATE rectal bleeding (small blood clots, passing blood without stool, or toilet water turns red) AND [2] more than once a day   Negative: Pale skin (pallor) of new-onset or worsening   Negative: Black or tarry bowel movements  (Exception: Chronic-unchanged black-grey BMs AND is taking iron pills or Pepto-Bismol.)   Negative: [1] Constant abdominal pain AND [2] present > 2 hours   Negative: Rectal foreign body (i.e., now or within past week;  inserted or swallowed)   Negative: High-risk adult (e.g., prior surgery on aorta, abdominal aortic aneurysm)   Negative: Taking Coumadin (warfarin) or other strong blood thinner, or known bleeding disorder (e.g., thrombocytopenia)   Negative: Known cirrhosis of the liver (or history of liver failure or ascites)   Negative: [1] Colonoscopy AND [2] in past 72 hours   Negative: Patient sounds very sick or weak to the triager   Negative: MODERATE rectal bleeding (small blood clots, passing blood without stool, or toilet water turns red)   Negative: MILD rectal bleeding (more than just a few drops or " "streaks)   Negative: Cancer of rectum or intestines (colon)   Negative: Radiation therapy to lower abdomen or pelvis   Negative: [1] Rectal bleeding is minimal (e.g., blood just on toilet paper, few drops, streaks on surface of normal formed BM) AND [2] bleeding recurs 3 or more times on treatment   Negative: Rectal bleeding is a chronic symptom (recurrent or ongoing AND present > 4 weeks)    Answer Assessment - Initial Assessment Questions  1. APPEARANCE of BLOOD: \"What color is it?\" \"Is it passed separately, on the surface of the stool, or mixed in with the stool?\"       None today but yesterday had   2. AMOUNT: \"How much blood was passed?\"       3 stool yesterday none today  3. FREQUENCY: \"How many times has blood been passed with the stools?\"       See above  4. ONSET: \"When was the blood first seen in the stools?\" (Days or weeks)       yesterday  5. DIARRHEA: \"Is there also some diarrhea?\" If Yes, ask: \"How many diarrhea stools in the past 24 hours?\"       Yes but it stopped  6. CONSTIPATION: \"Do you have constipation?\" If Yes, ask: \"How bad is it?\"      States yes it began with constipation then turned to diarrhea  7. RECURRENT SYMPTOMS: \"Have you had blood in your stools before?\" If Yes, ask: \"When was the last time?\" and \"What happened that time?\"       yes  8. BLOOD THINNERS: \"Do you take any blood thinners?\" (e.g., Coumadin/warfarin, Pradaxa/dabigatran, aspirin)      no  9. OTHER SYMPTOMS: \"Do you have any other symptoms?\"  (e.g., abdomen pain, vomiting, dizziness, fever)      Vomiting green yesterday  10. PREGNANCY: \"Is there any chance you are pregnant?\" \"When was your last menstrual period?\"        na    Protocols used: Rectal Bleeding-ADULT-AH    "

## 2024-04-23 ENCOUNTER — TELEPHONE (OUTPATIENT)
Dept: URGENT CARE | Facility: CLINIC | Age: 69
End: 2024-04-23
Payer: MEDICARE

## 2024-04-23 NOTE — TELEPHONE ENCOUNTER
Please call to check on the patient.    We had sent her directly to the ER for GI bleeding, and other concerns.    I still don't see record of an ER visit.    She needs immediate evaluation of her concerns.    Thanks.

## 2024-04-26 ENCOUNTER — TELEPHONE (OUTPATIENT)
Dept: URGENT CARE | Facility: CLINIC | Age: 69
End: 2024-04-26
Payer: MEDICARE

## 2024-04-26 NOTE — TELEPHONE ENCOUNTER
Please make sure the prior Telephone Note request remains active in the Clinical Pool, and that a follow-up phone call will be placed to the patient, to check on her status.  Please let me know an update.

## 2024-04-30 ENCOUNTER — TELEPHONE (OUTPATIENT)
Dept: FAMILY MEDICINE CLINIC | Facility: CLINIC | Age: 69
End: 2024-04-30
Payer: MEDICARE

## 2024-04-30 NOTE — TELEPHONE ENCOUNTER
Attempting to contact pt to see if xrays completed yet, no answer and no machine to leave message    Hub relay    Have you completed lumbar and thoracic xrays yet?

## 2024-05-03 ENCOUNTER — TELEPHONE (OUTPATIENT)
Dept: FAMILY MEDICINE CLINIC | Facility: CLINIC | Age: 69
End: 2024-05-03
Payer: MEDICARE

## 2024-05-03 NOTE — TELEPHONE ENCOUNTER
HUB TO RELAY     DR MAHAN HAD PLACED X RAY ORDERS FOR PATIENT CALLED TO SEE IF PATIENT HAD EVER HAD THEM DONE OR IF SHE PLANNED ON HAVING THEM DONE. SHE CAN HAVE THEM DONE AT [facility].

## 2024-05-03 NOTE — TELEPHONE ENCOUNTER
Name: Priscilla Fragoso      Relationship: Self      Best Callback Number:        [phone] (Mobile)       HUB PROVIDED THE RELAY MESSAGE FROM THE OFFICE      PATIENT: VOICED UNDERSTANDING AND HAS NO FURTHER QUESTIONS AT THIS TIME    ADDITIONAL INFORMATION:  SHE WILL GO AND HAVE THEM DONE SOON

## 2024-05-06 DIAGNOSIS — F41.9 ANXIETY: ICD-10-CM

## 2024-05-06 DIAGNOSIS — I10 PRIMARY HYPERTENSION: ICD-10-CM

## 2024-05-06 DIAGNOSIS — K21.9 GASTROESOPHAGEAL REFLUX DISEASE WITHOUT ESOPHAGITIS: ICD-10-CM

## 2024-05-06 RX ORDER — HYDROXYZINE HYDROCHLORIDE 25 MG/1
25 TABLET, FILM COATED ORAL 3 TIMES DAILY PRN
Qty: 90 TABLET | Refills: 1 | Status: SHIPPED | OUTPATIENT
Start: 2024-05-06

## 2024-05-06 RX ORDER — FAMOTIDINE 20 MG/1
20 TABLET, FILM COATED ORAL NIGHTLY PRN
Qty: 90 TABLET | Refills: 1 | Status: SHIPPED | OUTPATIENT
Start: 2024-05-06

## 2024-05-06 RX ORDER — CLONIDINE HYDROCHLORIDE 0.1 MG/1
0.1 TABLET ORAL 2 TIMES DAILY
Qty: 180 TABLET | Refills: 1 | Status: SHIPPED | OUTPATIENT
Start: 2024-05-06

## 2024-05-06 RX ORDER — ATENOLOL AND CHLORTHALIDONE TABLET 50; 25 MG/1; MG/1
1 TABLET ORAL DAILY
Qty: 90 TABLET | Refills: 1 | Status: SHIPPED | OUTPATIENT
Start: 2024-05-06

## 2024-05-08 ENCOUNTER — OFFICE VISIT (OUTPATIENT)
Dept: FAMILY MEDICINE CLINIC | Facility: CLINIC | Age: 69
End: 2024-05-08
Payer: MEDICARE

## 2024-05-08 VITALS
HEART RATE: 62 BPM | OXYGEN SATURATION: 96 % | TEMPERATURE: 97.7 F | RESPIRATION RATE: 18 BRPM | HEIGHT: 60 IN | BODY MASS INDEX: 27.52 KG/M2 | WEIGHT: 140.2 LBS | DIASTOLIC BLOOD PRESSURE: 78 MMHG | SYSTOLIC BLOOD PRESSURE: 128 MMHG

## 2024-05-08 DIAGNOSIS — E55.9 VITAMIN D DEFICIENCY: ICD-10-CM

## 2024-05-08 DIAGNOSIS — I10 PRIMARY HYPERTENSION: ICD-10-CM

## 2024-05-08 DIAGNOSIS — M51.36 LUMBAR DEGENERATIVE DISC DISEASE: ICD-10-CM

## 2024-05-08 DIAGNOSIS — I25.2 HISTORY OF MI (MYOCARDIAL INFARCTION): ICD-10-CM

## 2024-05-08 DIAGNOSIS — G89.29 CHRONIC MIDLINE LOW BACK PAIN WITH BILATERAL SCIATICA: ICD-10-CM

## 2024-05-08 DIAGNOSIS — M54.41 CHRONIC MIDLINE LOW BACK PAIN WITH BILATERAL SCIATICA: ICD-10-CM

## 2024-05-08 DIAGNOSIS — M54.42 CHRONIC MIDLINE LOW BACK PAIN WITH BILATERAL SCIATICA: ICD-10-CM

## 2024-05-08 DIAGNOSIS — E78.2 MIXED HYPERLIPIDEMIA: ICD-10-CM

## 2024-05-08 DIAGNOSIS — R73.03 PREDIABETES: Primary | ICD-10-CM

## 2024-05-08 DIAGNOSIS — Z12.31 ENCOUNTER FOR SCREENING MAMMOGRAM FOR MALIGNANT NEOPLASM OF BREAST: ICD-10-CM

## 2024-05-08 DIAGNOSIS — Z78.0 POSTMENOPAUSAL: ICD-10-CM

## 2024-05-08 DIAGNOSIS — S22.060D CLOSED WEDGE COMPRESSION FRACTURE OF T7 VERTEBRA WITH ROUTINE HEALING: ICD-10-CM

## 2024-05-08 PROCEDURE — 1160F RVW MEDS BY RX/DR IN RCRD: CPT | Performed by: FAMILY MEDICINE

## 2024-05-08 PROCEDURE — 3078F DIAST BP <80 MM HG: CPT | Performed by: FAMILY MEDICINE

## 2024-05-08 PROCEDURE — 99214 OFFICE O/P EST MOD 30 MIN: CPT | Performed by: FAMILY MEDICINE

## 2024-05-08 PROCEDURE — 1159F MED LIST DOCD IN RCRD: CPT | Performed by: FAMILY MEDICINE

## 2024-05-08 PROCEDURE — 1125F AMNT PAIN NOTED PAIN PRSNT: CPT | Performed by: FAMILY MEDICINE

## 2024-05-08 PROCEDURE — 3074F SYST BP LT 130 MM HG: CPT | Performed by: FAMILY MEDICINE

## 2024-05-08 RX ORDER — GABAPENTIN 100 MG/1
100 CAPSULE ORAL 3 TIMES DAILY
Qty: 90 CAPSULE | Refills: 2 | Status: SHIPPED | OUTPATIENT
Start: 2024-05-08

## 2024-05-09 LAB
25(OH)D3+25(OH)D2 SERPL-MCNC: 52.7 NG/ML (ref 30–100)
ALBUMIN SERPL-MCNC: 3.2 G/DL (ref 3.9–4.9)
ALBUMIN/GLOB SERPL: 0.8 {RATIO} (ref 1.2–2.2)
ALP SERPL-CCNC: 82 IU/L (ref 44–121)
ALT SERPL-CCNC: 20 IU/L (ref 0–32)
AST SERPL-CCNC: 64 IU/L (ref 0–40)
BASOPHILS # BLD AUTO: 0.1 X10E3/UL (ref 0–0.2)
BASOPHILS NFR BLD AUTO: 1 %
BILIRUB SERPL-MCNC: 1.2 MG/DL (ref 0–1.2)
BUN SERPL-MCNC: 10 MG/DL (ref 8–27)
BUN/CREAT SERPL: 12 (ref 12–28)
CALCIUM SERPL-MCNC: 8.7 MG/DL (ref 8.7–10.3)
CHLORIDE SERPL-SCNC: 99 MMOL/L (ref 96–106)
CHOLEST SERPL-MCNC: 163 MG/DL (ref 100–199)
CO2 SERPL-SCNC: 24 MMOL/L (ref 20–29)
CREAT SERPL-MCNC: 0.85 MG/DL (ref 0.57–1)
EGFRCR SERPLBLD CKD-EPI 2021: 74 ML/MIN/1.73
EOSINOPHIL # BLD AUTO: 0.2 X10E3/UL (ref 0–0.4)
EOSINOPHIL NFR BLD AUTO: 3 %
ERYTHROCYTE [DISTWIDTH] IN BLOOD BY AUTOMATED COUNT: 13.6 % (ref 11.7–15.4)
GLOBULIN SER CALC-MCNC: 4 G/DL (ref 1.5–4.5)
GLUCOSE SERPL-MCNC: 89 MG/DL (ref 70–99)
HBA1C MFR BLD: 5.6 % (ref 4.8–5.6)
HCT VFR BLD AUTO: 41.8 % (ref 34–46.6)
HDLC SERPL-MCNC: 22 MG/DL
HGB BLD-MCNC: 13.4 G/DL (ref 11.1–15.9)
IMM GRANULOCYTES # BLD AUTO: 0 X10E3/UL (ref 0–0.1)
IMM GRANULOCYTES NFR BLD AUTO: 0 %
LDLC SERPL CALC-MCNC: 118 MG/DL (ref 0–99)
LYMPHOCYTES # BLD AUTO: 2.1 X10E3/UL (ref 0.7–3.1)
LYMPHOCYTES NFR BLD AUTO: 34 %
MCH RBC QN AUTO: 31.2 PG (ref 26.6–33)
MCHC RBC AUTO-ENTMCNC: 32.1 G/DL (ref 31.5–35.7)
MCV RBC AUTO: 97 FL (ref 79–97)
MONOCYTES # BLD AUTO: 0.8 X10E3/UL (ref 0.1–0.9)
MONOCYTES NFR BLD AUTO: 13 %
NEUTROPHILS # BLD AUTO: 3 X10E3/UL (ref 1.4–7)
NEUTROPHILS NFR BLD AUTO: 49 %
PLATELET # BLD AUTO: 176 X10E3/UL (ref 150–450)
POTASSIUM SERPL-SCNC: 4.3 MMOL/L (ref 3.5–5.2)
PROT SERPL-MCNC: 7.2 G/DL (ref 6–8.5)
RBC # BLD AUTO: 4.29 X10E6/UL (ref 3.77–5.28)
SODIUM SERPL-SCNC: 136 MMOL/L (ref 134–144)
TRIGL SERPL-MCNC: 125 MG/DL (ref 0–149)
VLDLC SERPL CALC-MCNC: 23 MG/DL (ref 5–40)
WBC # BLD AUTO: 6.2 X10E3/UL (ref 3.4–10.8)

## 2024-05-14 ENCOUNTER — TELEPHONE (OUTPATIENT)
Dept: FAMILY MEDICINE CLINIC | Facility: CLINIC | Age: 69
End: 2024-05-14
Payer: MEDICARE

## 2024-05-14 NOTE — TELEPHONE ENCOUNTER
HUB TO RELAY     let her know that her blood work from last week all looked good.  Blood sugar was normal.   Vitamin D level is excellent at 52.  She was not anemic.  Kidney function tests are normal.  One of her liver enzymes is minimally elevated.  It is dramatically better than what it was last year.  Finally, her cholesterol numbers are continuing to trend down a little bit.  The Repatha is doing its job.   No changes based on these labs.  Keep up the good work.  Thanks!

## 2024-05-15 ENCOUNTER — OFFICE VISIT (OUTPATIENT)
Dept: FAMILY MEDICINE CLINIC | Facility: CLINIC | Age: 69
End: 2024-05-15
Payer: MEDICARE

## 2024-05-15 VITALS
HEART RATE: 71 BPM | DIASTOLIC BLOOD PRESSURE: 85 MMHG | HEIGHT: 60 IN | BODY MASS INDEX: 27.84 KG/M2 | TEMPERATURE: 97.5 F | SYSTOLIC BLOOD PRESSURE: 134 MMHG | WEIGHT: 141.8 LBS | RESPIRATION RATE: 18 BRPM | OXYGEN SATURATION: 96 %

## 2024-05-15 DIAGNOSIS — Z00.00 PHYSICAL EXAM, ANNUAL: Primary | ICD-10-CM

## 2024-05-15 DIAGNOSIS — Z12.11 COLON CANCER SCREENING: ICD-10-CM

## 2024-05-15 NOTE — TELEPHONE ENCOUNTER
Name: [place]illa      Relationship: Self      Best Callback Number: [phone]       HUB PROVIDED THE RELAY MESSAGE FROM THE OFFICE      PATIENT: VOICED UNDERSTANDING AND HAS NO FURTHER QUESTIONS AT THIS TIME

## 2024-05-15 NOTE — PROGRESS NOTES
The ABCs of the Annual Wellness Visit  Subsequent Medicare Wellness Visit    Subjective      Priscilla Fragoso is a 68 y.o. female who presents for a Subsequent Medicare Wellness Visit.    The following portions of the patient's history were reviewed and   updated as appropriate: allergies, current medications, past family history, past medical history, past social history, past surgical history, and problem list.    Compared to one year ago, the patient feels her physical   health is better.    Compared to one year ago, the patient feels her mental   health is better.    Recent Hospitalizations:  She was not admitted to the hospital during the last year.       Current Medical Providers:  Patient Care Team:  Alysia Moe MD as PCP - General (Family Medicine)    Outpatient Medications Prior to Visit   Medication Sig Dispense Refill    albuterol (ACCUNEB) 0.63 MG/3ML nebulizer solution Take 3 mL by nebulization Every Night. 100 each 3    albuterol sulfate  (90 Base) MCG/ACT inhaler Inhale 2 puffs Every 4 (Four) Hours As Needed for Wheezing. 54 g 3    aspirin 325 MG tablet Take 1 tablet by mouth Daily.      atenolol-chlorthalidone (TENORETIC) 50-25 MG per tablet TAKE 1 TABLET BY MOUTH DAILY 90 tablet 1    B-COMPLEX-C PO Take  by mouth.      cloNIDine (CATAPRES) 0.1 MG tablet TAKE 1 TABLET BY MOUTH TWICE DAILY 180 tablet 1    cloNIDine (CATAPRES) 0.2 MG tablet TAKE 1 TABLET BY MOUTH TWICE DAILY 180 tablet 0    cyclobenzaprine (FLEXERIL) 5 MG tablet Take 1 tablet by mouth 3 (Three) Times a Day As Needed for Muscle Spasms. 90 tablet 1    esomeprazole (nexIUM) 20 MG capsule Take 1 capsule by mouth Every Morning Before Breakfast. 90 capsule 3    Evolocumab (Repatha) solution prefilled syringe injection Inject 1 mL under the skin into the appropriate area as directed Every 14 (Fourteen) Days. 6 mL 3    famotidine (PEPCID) 20 MG tablet TAKE 1 TABLET BY MOUTH AT NIGHT AS NEEDED FOR HEARTBURN 90 tablet 1     gabapentin (NEURONTIN) 100 MG capsule Take 1 capsule by mouth 3 (Three) Times a Day. 90 capsule 2    hydrOXYzine (ATARAX) 25 MG tablet TAKE 1 TABLET BY MOUTH THREE TIMES DAILY AS NEEDED FOR ITCHING 90 tablet 1    montelukast (SINGULAIR) 10 MG tablet TAKE 1 TABLET BY MOUTH EVERY NIGHT 90 tablet 1    naproxen sodium (ALEVE) 220 MG tablet Take 1 tablet by mouth 2 (Two) Times a Day With Meals. 60 tablet 2    ondansetron ODT (Zofran ODT) 4 MG disintegrating tablet Place 1 tablet on the tongue Every 8 (Eight) Hours As Needed for Nausea or Vomiting. 24 tablet 0    promethazine (PHENERGAN) 12.5 MG tablet Take 1 tablet by mouth Every 6 (Six) Hours As Needed for Nausea or Vomiting. 21 tablet 0    vitamin D3 125 MCG (5000 UT) capsule capsule Take 1 capsule by mouth Daily. 90 capsule 1     No facility-administered medications prior to visit.       No opioid medication identified on active medication list. I have reviewed chart for other potential  high risk medication/s and harmful drug interactions in the elderly.        Aspirin is on active medication list. Aspirin use is indicated based on review of current medical condition/s. Pros and cons of this therapy have been discussed today. Benefits of this medication outweigh potential harm.  Patient has been encouraged to continue taking this medication.  .      Patient Active Problem List   Diagnosis    Mixed hyperlipidemia    Primary hypertension    Prediabetes    History of stroke    History of MI (myocardial infarction)    Vitamin D deficiency    Gastroesophageal reflux disease without esophagitis    Chronic midline low back pain with bilateral sciatica    History of seizure    Recurrent major depressive disorder, in remission    Closed wedge compression fracture of T7 vertebra with routine healing    Abdominal pain, RLQ    Epigastric pain    Nausea and vomiting    LLQ pain    Cerebral microvascular disease     Advance Care Planning   Advance Care Planning     Advance  "Directive is on file.  ACP discussion was held with the patient during this visit. Patient has an advance directive in EMR which is still valid.      Objective    Vitals:    05/15/24 1447   BP: 134/85   BP Location: Right arm   Patient Position: Sitting   Cuff Size: Adult   Pulse: 71   Resp: 18   Temp: 97.5 °F (36.4 °C)   TempSrc: Infrared   SpO2: 96%   Weight: 64.3 kg (141 lb 12.8 oz)   Height: 152.4 cm (60\")     Estimated body mass index is 27.69 kg/m² as calculated from the following:    Height as of this encounter: 152.4 cm (60\").    Weight as of this encounter: 64.3 kg (141 lb 12.8 oz).    BMI is >= 25 and <30. (Overweight) The following options were offered after discussion;: exercise counseling/recommendations and nutrition counseling/recommendations      Does the patient have evidence of cognitive impairment?   No    Lab Results   Component Value Date    CHLPL 163 2024    TRIG 125 2024    HDL 22 (L) 2024     (H) 2024    VLDL 23 2024    HGBA1C 5.6 2024          HEALTH RISK ASSESSMENT    Smoking Status:  Social History     Tobacco Use   Smoking Status Former    Current packs/day: 0.00    Average packs/day: 3.0 packs/day for 15.1 years (45.4 ttl pk-yrs)    Types: Cigarettes    Start date:     Quit date: 1994    Years since quittin.3    Passive exposure: Past   Smokeless Tobacco Never     Alcohol Consumption:  Social History     Substance and Sexual Activity   Alcohol Use Not Currently     Fall Risk Screen:    STEADI Fall Risk Assessment was completed, and patient is at MODERATE risk for falls. Assessment completed on:2024    Depression Screenin/8/2024     2:25 PM   PHQ-2/PHQ-9 Depression Screening   Little Interest or Pleasure in Doing Things 0-->not at all   Feeling Down, Depressed or Hopeless 3-->nearly every day   Trouble Falling or Staying Asleep, or Sleeping Too Much 3-->nearly every day   Feeling Tired or Having Little Energy 0-->not at " all   Poor Appetite or Overeating 0-->not at all   Feeling Bad about Yourself - or that You are a Failure or Have Let Yourself or Your Family Down 0-->not at all   Trouble Concentrating on Things, Such as Reading the Newspaper or Watching Television 3-->nearly every day   Moving or Speaking So Slowly that Other People Could Have Noticed? Or the Opposite - Being So Fidgety 0-->not at all   Thoughts that You Would be Better Off Dead or of Hurting Yourself in Some Way 0-->not at all   PHQ-9: Brief Depression Severity Measure Score 9   If You Checked Off Any Problems, How Difficult Have These Problems Made It For You to Do Your Work, Take Care of Things at Home, or Get Along with Other People? somewhat difficult       Health Habits and Functional and Cognitive Screenin/15/2024     2:44 PM   Functional & Cognitive Status   Do you have difficulty preparing food and eating? No   Do you have difficulty bathing yourself, getting dressed or grooming yourself? No   Do you have difficulty using the toilet? No   Do you have difficulty moving around from place to place? No   Do you have trouble with steps or getting out of a bed or a chair? No   Current Diet Well Balanced Diet   Dental Exam Not up to date   Eye Exam Not up to date   Exercise (times per week) 7 times per week   Current Exercises Include Light Weights;Walking   Do you need help using the phone?  No   Are you deaf or do you have serious difficulty hearing?  No   Do you need help to go to places out of walking distance? No   Do you need help shopping? No   Do you need help preparing meals?  No   Do you need help with housework?  No   Do you need help with laundry? No   Do you need help taking your medications? No   Do you need help managing money? No   Do you ever drive or ride in a car without wearing a seat belt? No   Have you felt unusual stress, anger or loneliness in the last month? No   Who do you live with? Alone   If you need help, do you have  trouble finding someone available to you? No   Have you been bothered in the last four weeks by sexual problems? No   Do you have difficulty concentrating, remembering or making decisions? No       Age-appropriate Screening Schedule:  Refer to the list below for future screening recommendations based on patient's age, sex and/or medical conditions. Orders for these recommended tests are listed in the plan section. The patient has been provided with a written plan.    Health Maintenance   Topic Date Due    MAMMOGRAM  Never done    DXA SCAN  Never done    COLORECTAL CANCER SCREENING  Never done    ZOSTER VACCINE (1 of 2) Never done    RSV Vaccine - Adults (1 - 1-dose 60+ series) Never done    HEPATITIS C SCREENING  Never done    COVID-19 Vaccine (4 - 2023-24 season) 02/05/2024    INFLUENZA VACCINE  08/01/2024    LIPID PANEL  05/08/2025    ANNUAL WELLNESS VISIT  05/15/2025    BMI FOLLOWUP  05/15/2025    Pneumococcal Vaccine 65+  Completed                  CMS Preventative Services Quick Reference  Risk Factors Identified During Encounter:    Immunizations Discussed/Encouraged: Shingrix and RSV (Respiratory Syncytial Virus)    The above risks/problems have been discussed with the patient.  Pertinent information has been shared with the patient in the After Visit Summary.    Diagnoses and all orders for this visit:    1. Physical exam, annual (Primary)    2. Colon cancer screening  -     Ambulatory Referral to Colorectal Surgery    She is ready to get a screening colonoscopy.  Referral made to Dr. Lindsay for this.  Keep follow-up appointment as planned at the last visit To reevaluate in July to follow-up on lumbar pains.     Follow Up:   Next Medicare Wellness visit to be scheduled in 1 year.      An After Visit Summary and PPPS were made availabl e to the patient.

## 2024-06-21 RX ORDER — MONTELUKAST SODIUM 10 MG/1
10 TABLET ORAL NIGHTLY
Qty: 90 TABLET | Refills: 3 | Status: SHIPPED | OUTPATIENT
Start: 2024-06-21

## 2024-07-09 DIAGNOSIS — F41.9 ANXIETY: ICD-10-CM

## 2024-07-09 RX ORDER — HYDROXYZINE HYDROCHLORIDE 25 MG/1
25 TABLET, FILM COATED ORAL 3 TIMES DAILY PRN
Qty: 90 TABLET | Refills: 1 | Status: SHIPPED | OUTPATIENT
Start: 2024-07-09

## 2024-08-01 DIAGNOSIS — E55.9 VITAMIN D DEFICIENCY: ICD-10-CM

## 2024-08-01 DIAGNOSIS — I10 PRIMARY HYPERTENSION: ICD-10-CM

## 2024-08-01 RX ORDER — CLONIDINE HYDROCHLORIDE 0.2 MG/1
TABLET ORAL
Qty: 180 TABLET | Refills: 1 | Status: SHIPPED | OUTPATIENT
Start: 2024-08-01

## 2024-08-01 RX ORDER — CHOLECALCIFEROL (VITAMIN D3) 125 MCG
1 CAPSULE ORAL DAILY
Qty: 90 CAPSULE | Refills: 1 | Status: SHIPPED | OUTPATIENT
Start: 2024-08-01

## 2024-08-11 DIAGNOSIS — I25.2 HISTORY OF MI (MYOCARDIAL INFARCTION): ICD-10-CM

## 2024-08-11 DIAGNOSIS — E78.2 MIXED HYPERLIPIDEMIA: ICD-10-CM

## 2024-08-12 RX ORDER — EVOLOCUMAB 140 MG/ML
INJECTION, SOLUTION SUBCUTANEOUS
Qty: 6 ML | Refills: 3 | Status: SHIPPED | OUTPATIENT
Start: 2024-08-12

## 2024-08-12 NOTE — PATIENT INSTRUCTIONS
Health Maintenance Due   Topic Date Due    MAMMOGRAM  Never done    DXA SCAN  Never done    COLORECTAL CANCER SCREENING  Never done    ZOSTER VACCINE (1 of 2) Never done    HEPATITIS C SCREENING  Never done    COVID-19 Vaccine (4 - 2023-24 season) 02/05/2024    INFLUENZA VACCINE  08/01/2024    Esomeprazole at night    RSV

## 2024-08-13 ENCOUNTER — OFFICE VISIT (OUTPATIENT)
Dept: FAMILY MEDICINE CLINIC | Facility: CLINIC | Age: 69
End: 2024-08-13
Payer: MEDICARE

## 2024-08-13 VITALS
TEMPERATURE: 97.4 F | HEIGHT: 60 IN | BODY MASS INDEX: 27.41 KG/M2 | DIASTOLIC BLOOD PRESSURE: 72 MMHG | OXYGEN SATURATION: 96 % | HEART RATE: 59 BPM | WEIGHT: 139.6 LBS | SYSTOLIC BLOOD PRESSURE: 123 MMHG

## 2024-08-13 DIAGNOSIS — E78.5 HYPERLIPIDEMIA, UNSPECIFIED HYPERLIPIDEMIA TYPE: ICD-10-CM

## 2024-08-13 DIAGNOSIS — Z12.31 ENCOUNTER FOR SCREENING MAMMOGRAM FOR MALIGNANT NEOPLASM OF BREAST: ICD-10-CM

## 2024-08-13 DIAGNOSIS — E66.3 OVERWEIGHT WITH BODY MASS INDEX (BMI) OF 27 TO 27.9 IN ADULT: ICD-10-CM

## 2024-08-13 DIAGNOSIS — R31.9 HEMATURIA, UNSPECIFIED TYPE: ICD-10-CM

## 2024-08-13 DIAGNOSIS — Z11.59 ENCOUNTER FOR HEPATITIS C SCREENING TEST FOR LOW RISK PATIENT: ICD-10-CM

## 2024-08-13 DIAGNOSIS — F33.40 RECURRENT MAJOR DEPRESSIVE DISORDER, IN REMISSION: ICD-10-CM

## 2024-08-13 DIAGNOSIS — Z91.89 ENCOUNTER FOR HEPATITIS C VIRUS SCREENING TEST FOR HIGH RISK PATIENT: ICD-10-CM

## 2024-08-13 DIAGNOSIS — Z78.0 POST-MENOPAUSAL: ICD-10-CM

## 2024-08-13 DIAGNOSIS — R11.2 NAUSEA AND VOMITING, UNSPECIFIED VOMITING TYPE: ICD-10-CM

## 2024-08-13 DIAGNOSIS — K21.9 GASTROESOPHAGEAL REFLUX DISEASE WITHOUT ESOPHAGITIS: ICD-10-CM

## 2024-08-13 DIAGNOSIS — R10.32 LLQ PAIN: ICD-10-CM

## 2024-08-13 DIAGNOSIS — R10.13 EPIGASTRIC PAIN: ICD-10-CM

## 2024-08-13 DIAGNOSIS — R10.31 ABDOMINAL PAIN, RLQ: ICD-10-CM

## 2024-08-13 DIAGNOSIS — Z11.59 ENCOUNTER FOR HEPATITIS C VIRUS SCREENING TEST FOR HIGH RISK PATIENT: ICD-10-CM

## 2024-08-13 DIAGNOSIS — Z12.11 SCREENING FOR COLON CANCER: ICD-10-CM

## 2024-08-13 DIAGNOSIS — Z11.4 SCREENING FOR HIV (HUMAN IMMUNODEFICIENCY VIRUS): ICD-10-CM

## 2024-08-13 DIAGNOSIS — R73.03 PREDIABETES: ICD-10-CM

## 2024-08-13 DIAGNOSIS — E55.9 VITAMIN D DEFICIENCY: ICD-10-CM

## 2024-08-13 DIAGNOSIS — I10 PRIMARY HYPERTENSION: ICD-10-CM

## 2024-08-13 DIAGNOSIS — N20.0 KIDNEY STONES: ICD-10-CM

## 2024-08-13 DIAGNOSIS — R19.4 BOWEL HABIT CHANGES: Primary | ICD-10-CM

## 2024-08-13 PROBLEM — H04.123 DRY EYES: Status: ACTIVE | Noted: 2024-07-26

## 2024-08-13 PROBLEM — H52.4 PRESBYOPIA: Status: ACTIVE | Noted: 2024-07-26

## 2024-08-13 PROBLEM — H26.493 BILATERAL POSTERIOR CAPSULAR OPACIFICATION: Status: ACTIVE | Noted: 2024-07-26

## 2024-08-13 LAB
BILIRUB BLD-MCNC: ABNORMAL MG/DL
CLARITY, POC: CLEAR
COLOR UR: ABNORMAL
EXPIRATION DATE: ABNORMAL
GLUCOSE UR STRIP-MCNC: NEGATIVE MG/DL
KETONES UR QL: NEGATIVE
LEUKOCYTE EST, POC: ABNORMAL
Lab: ABNORMAL
NITRITE UR-MCNC: NEGATIVE MG/ML
PH UR: 7 [PH] (ref 5–8)
PROT UR STRIP-MCNC: NEGATIVE MG/DL
RBC # UR STRIP: NEGATIVE /UL
SP GR UR: 1.02 (ref 1–1.03)
UROBILINOGEN UR QL: ABNORMAL

## 2024-08-13 RX ORDER — METRONIDAZOLE 500 MG/1
500 TABLET ORAL 3 TIMES DAILY
Qty: 30 TABLET | Refills: 0 | Status: SHIPPED | OUTPATIENT
Start: 2024-08-13

## 2024-08-13 RX ORDER — CIPROFLOXACIN 500 MG/1
500 TABLET, FILM COATED ORAL 2 TIMES DAILY
Qty: 20 TABLET | Refills: 0 | Status: SHIPPED | OUTPATIENT
Start: 2024-08-13

## 2024-08-13 NOTE — ASSESSMENT & PLAN NOTE
Patient's (Body mass index is 27.26 kg/m².) indicates that they are overweight with health conditions that include hypertension and dyslipidemias . Weight is newly identified. BMI is above average; BMI management plan is completed. We discussed portion control and increasing exercise.

## 2024-08-13 NOTE — PROGRESS NOTES
Subjective   Priscilla Fragoso is a 69 y.o. female presents for   Chief Complaint   Patient presents with    Blood in Urine     Started 3 days ago    Rectal Bleeding     Noticed it 3 days ago. She stated that she gets constipated and can't go for several days       Health Maintenance Due   Topic Date Due    MAMMOGRAM  Never done    DXA SCAN  Never done    COLORECTAL CANCER SCREENING  Never done    ZOSTER VACCINE (1 of 2) Never done    HEPATITIS C SCREENING  Never done    COVID-19 Vaccine (4 - 2023-24 season) 02/05/2024    INFLUENZA VACCINE  08/01/2024       Blood in Urine  Irritative symptoms do not include frequency. Associated symptoms include abdominal pain, nausea and vomiting. Pertinent negatives include no dysuria or flank pain.   Rectal Bleeding  Associated symptoms include abdominal pain, nausea and vomiting.      History of Present Illness  The patient is a 69-year-old female who is here today to follow up on bowel habit changes, overweight with a body mass index of 27, mammogram screening, postmenopausal, screening for colon cancer, hepatitis C screening, HIV screening, abdominal pain, right lower quadrant, GERD, epigastric pain, left lower quadrant pain, nausea and vomiting, unspecified prediabetes, hypertension, recurrent major depressive disorder, and hematuria. She is accompanied by an adult female.  Who is allowed to stay.    She began experiencing constipation two days prior to her appointment. Her bowel movements have since become loose, and she has been unable to have a bowel movement for the past 3 to 4 days. Her constipation is severe, causing chest pain, constant vomiting, and severe left lower quadrant pain. She has a history of constipation, which she manages with prune juice and magnesium citrate. Her weight has remained stable at around 138 to 140 pounds. Her stool was black, red, and with a white film during her constipation episodes. She has a history of tubal ligation. Her urine was dark in  color but has since lightened. She denies any pain, burning, or itching, fever, or chills. She has a history of kidney stones. She denies any liver problems. She is allergic to PENICILLIN, SULFA, CODEINE, and TETANUS.    She has a history of esophageal issues, which cause acid reflux and vomiting. She last saw her gastroenterologist five years ago, who performed a colonoscopy and an endoscopy. The colonoscopy results were normal. She denies any rectal bleeding at that time. She has been vomiting for the past 10 days, particularly upon waking. She often vomits yellow and green bile, but once it is removed, her stomach quits. She takes Nexium 30 minutes before eating in the morning. She experiences severe back pain and vomiting, which radiates down her legs. She also experiences a burning sensation in her throat.    She is borderline diabetic. She takes Aleve for pain, Flexeril as needed, gabapentin for neuropathy, and Repatha for cholesterol. Her mood and depression are stable. She was previously on medication for depression, but discontinued it due to unpleasant side effects. She takes hydroxyzine for anxiety. She had cataracts and was legally blind all her life. She has an appointment with Dr. Block on 09/21/2024. She does not snore unless she is extremely exhausted. She experiences shooting pain to the side of her head when she moves her eyes, which she believes is due to her migraines. She stopped drinking alcohol 6 days ago.    Supplemental Information  She has fractured her back in 3 places.    SOCIAL HISTORY  She stopped drinking alcohol 6 days ago. She has not smoked for 40 years.    FAMILY HISTORY  Her niece had cancer. Her youngest daughter had throat cancer.    ALLERGIES  She is allergic to PENICILLIN, SULFA, CODEINE, and TETANUS.    IMMUNIZATIONS  She has received COVID-19, influenza, pneumonia, and Prevnar 20 vaccines.    Vitals:    08/13/24 1535 08/13/24 1538 08/13/24 1539   BP: 131/60 118/76 123/72  "  BP Location: Right arm Left arm Left arm   Patient Position: Sitting Sitting Standing   Cuff Size: Adult Large Adult Large Adult   Pulse: 59     Temp: 97.4 °F (36.3 °C)     TempSrc: Tympanic     SpO2: 96%     Weight: 63.3 kg (139 lb 9.6 oz)     Height: 152.4 cm (60\")       Body mass index is 27.26 kg/m².    Current Outpatient Medications on File Prior to Visit   Medication Sig Dispense Refill    albuterol (ACCUNEB) 0.63 MG/3ML nebulizer solution Take 3 mL by nebulization Every Night. 100 each 3    albuterol sulfate  (90 Base) MCG/ACT inhaler Inhale 2 puffs Every 4 (Four) Hours As Needed for Wheezing. 54 g 3    aspirin 325 MG tablet Take 1 tablet by mouth Daily.      atenolol-chlorthalidone (TENORETIC) 50-25 MG per tablet TAKE 1 TABLET BY MOUTH DAILY 90 tablet 1    B-COMPLEX-C PO Take  by mouth.      cloNIDine (CATAPRES) 0.2 MG tablet TAKE 1 TABLET BY MOUTH TWICE DAILY 180 tablet 1    D3 Maximum Strength 125 MCG (5000 UT) capsule capsule TAKE 1 CAPSULE BY MOUTH DAILY 90 capsule 1    esomeprazole (nexIUM) 20 MG capsule Take 1 capsule by mouth Every Morning Before Breakfast. 90 capsule 3    famotidine (PEPCID) 20 MG tablet TAKE 1 TABLET BY MOUTH AT NIGHT AS NEEDED FOR HEARTBURN 90 tablet 1    gabapentin (NEURONTIN) 100 MG capsule Take 1 capsule by mouth 3 (Three) Times a Day. 90 capsule 2    hydrOXYzine (ATARAX) 25 MG tablet TAKE 1 TABLET BY MOUTH THREE TIMES DAILY AS NEEDED FOR ITCHING 90 tablet 1    montelukast (SINGULAIR) 10 MG tablet TAKE 1 TABLET BY MOUTH EVERY NIGHT 90 tablet 3    Repatha solution prefilled syringe injection INJECT 1 MILLILITER UNDER THE SKIN AS DIRECTED EVERY 14 DAYS 6 mL 3    [DISCONTINUED] naproxen sodium (ALEVE) 220 MG tablet Take 1 tablet by mouth 2 (Two) Times a Day With Meals. 60 tablet 2    cloNIDine (CATAPRES) 0.1 MG tablet TAKE 1 TABLET BY MOUTH TWICE DAILY (Patient not taking: Reported on 8/13/2024) 180 tablet 1    cyclobenzaprine (FLEXERIL) 5 MG tablet Take 1 tablet by mouth " 3 (Three) Times a Day As Needed for Muscle Spasms. (Patient not taking: Reported on 8/13/2024) 90 tablet 1    [DISCONTINUED] ondansetron ODT (Zofran ODT) 4 MG disintegrating tablet Place 1 tablet on the tongue Every 8 (Eight) Hours As Needed for Nausea or Vomiting. (Patient not taking: Reported on 8/13/2024) 24 tablet 0    [DISCONTINUED] promethazine (PHENERGAN) 12.5 MG tablet Take 1 tablet by mouth Every 6 (Six) Hours As Needed for Nausea or Vomiting. (Patient not taking: Reported on 8/13/2024) 21 tablet 0     No current facility-administered medications on file prior to visit.       The following portions of the patient's history were reviewed and updated as appropriate: allergies, current medications, past family history, past medical history, past social history, past surgical history, and problem list.    Review of Systems   Gastrointestinal:  Positive for abdominal pain, anal bleeding, blood in stool, constipation, diarrhea, hematochezia, nausea, rectal pain, vomiting, GERD and indigestion.   Genitourinary:  Positive for hematuria. Negative for dysuria, flank pain, frequency and vaginal bleeding.   Psychiatric/Behavioral:  Positive for depressed mood.        Objective   Physical Exam  Vitals reviewed.   Constitutional:       General: She is not in acute distress.     Appearance: Normal appearance. She is well-developed. She is not ill-appearing or toxic-appearing.   HENT:      Head: Normocephalic and atraumatic.      Right Ear: Tympanic membrane, ear canal and external ear normal.      Left Ear: Tympanic membrane, ear canal and external ear normal.      Nose: Nose normal.      Mouth/Throat:      Mouth: Mucous membranes are moist.      Pharynx: No posterior oropharyngeal erythema.   Eyes:      Extraocular Movements: Extraocular movements intact.      Conjunctiva/sclera: Conjunctivae normal.      Pupils: Pupils are equal, round, and reactive to light.   Neck:      Vascular: No carotid bruit.   Cardiovascular:       Rate and Rhythm: Normal rate and regular rhythm.      Heart sounds: Normal heart sounds.   Pulmonary:      Effort: Pulmonary effort is normal.      Comments: Decreased breath sounds bilaterally  Abdominal:      General: Bowel sounds are normal. There is no distension.      Palpations: Abdomen is soft. There is no mass.      Tenderness: There is abdominal tenderness. There is no right CVA tenderness or left CVA tenderness.      Comments: Marked tenderness in the left lower quadrant no masses palpated   Musculoskeletal:         General: Normal range of motion.      Cervical back: Neck supple. No tenderness.   Lymphadenopathy:      Cervical: No cervical adenopathy.   Skin:     General: Skin is warm.   Neurological:      General: No focal deficit present.      Mental Status: She is alert and oriented to person, place, and time.   Psychiatric:         Mood and Affect: Mood normal.         Behavior: Behavior normal.       Physical Exam  Decreased breath sounds in the lungs.  Heart has a good rate and rhythm.    PHQ-9 Total Score:    Results  Laboratory Studies  Urine test showed a little bit of infection and small bilirubin, but no blood. Last A1c was 5.4 in February and 5.5 11 months ago. Bad cholesterol is 118, down from 161.         Assessment & Plan   Diagnoses and all orders for this visit:    1. Bowel habit changes (Primary)    2. Encounter for screening mammogram for malignant neoplasm of breast    3. Post-menopausal    4. Screening for colon cancer    5. Encounter for hepatitis C virus screening test for high risk patient    6. Screening for HIV (human immunodeficiency virus)  -     HIV-1 / O / 2 Ag / Antibody    7. Abdominal pain, RLQ  -     Ambulatory Referral to Gastroenterology    8. Gastroesophageal reflux disease without esophagitis  -     Ambulatory Referral to Gastroenterology    9. Epigastric pain  -     Ambulatory Referral to Gastroenterology  -     [facility]    10. LLQ pain  -     Ambulatory Referral  to Gastroenterology    11. Nausea and vomiting, unspecified vomiting type  -     Ambulatory Referral to Gastroenterology    12. Prediabetes    13. Primary hypertension  -     CBC Auto Differential  -     Comprehensive Metabolic Panel    14. Recurrent major depressive disorder, in remission  -     TSH Rfx On Abnormal To Free T4    15. Hematuria, unspecified type  -     POC Urinalysis Dipstick, Automated  -     Urine Culture - Urine, Urine, Clean Catch    16. Overweight with body mass index (BMI) of 27 to 27.9 in adult  Assessment & Plan:  Patient's (Body mass index is 27.26 kg/m².) indicates that they are overweight with health conditions that include hypertension and dyslipidemias . Weight is newly identified. BMI is above average; BMI management plan is completed. We discussed portion control and increasing exercise.       17. Vitamin D deficiency  -     Vitamin D,25-Hydroxy    18. Encounter for hepatitis C screening test for low risk patient  -     Hepatitis C Antibody    19. Hyperlipidemia, unspecified hyperlipidemia type  -     Lipid Panel    20. Kidney stones  -     Uric Acid    Other orders  -     metroNIDAZOLE (Flagyl) 500 MG tablet; Take 1 tablet by mouth 3 (Three) Times a Day.  Dispense: 30 tablet; Refill: 0  -     ciprofloxacin (Cipro) 500 MG tablet; Take 1 tablet by mouth 2 (Two) Times a Day.  Dispense: 20 tablet; Refill: 0      Assessment & Plan  1. Constipation.  A referral to a gastroenterologist will be made. In the interim, Nexium will be taken with dinner.    2. Hematuria.  Urinalysis revealed a minor infection, small bilirubin, and trace leukocytes, but no blood. Two IFOBS will be provided to check for blood in the stool. Urine cups will be provided for collection. A referral to a nephrologist will be made. Metronidazole will be prescribed, to be taken three times daily.    3. Heartburn.  Omeprazole will be taken at night to alleviate the morning vomiting. If vomiting persists, Nexium will be  increased to 2 tablets at night to alleviate the morning vomit cycle. If no improvement is observed, an antacid will be prescribed to coat her stomach and esophagus.    4. Health maintenance.  She is due for a mammogram. A DEXA scan will be ordered. Influenza and COVID-19 vaccines are scheduled for 09/2024. RSV vaccine will be administered.    5. Depression.  Hydroxyzine will be continued.    Follow-up  A follow-up visit is scheduled in 3 months.    Patient Instructions     Health Maintenance Due   Topic Date Due    MAMMOGRAM  Never done    DXA SCAN  Never done    COLORECTAL CANCER SCREENING  Never done    ZOSTER VACCINE (1 of 2) Never done    HEPATITIS C SCREENING  Never done    COVID-19 Vaccine (4 - 2023-24 season) 02/05/2024    INFLUENZA VACCINE  08/01/2024    Esomeprazole at night    RSV       Patient or patient representative verbalized consent for the use of Ambient Listening during the visit with  Zoe Mohr MD for chart documentation. 8/13/2024  18:03 EDT

## 2024-08-16 ENCOUNTER — PREP FOR SURGERY (OUTPATIENT)
Dept: OTHER | Facility: HOSPITAL | Age: 69
End: 2024-08-16
Payer: MEDICARE

## 2024-08-16 DIAGNOSIS — Z12.11 ENCOUNTER FOR SCREENING COLONOSCOPY: Primary | ICD-10-CM

## 2024-08-17 RX ORDER — SODIUM CHLORIDE, SODIUM LACTATE, POTASSIUM CHLORIDE, CALCIUM CHLORIDE 600; 310; 30; 20 MG/100ML; MG/100ML; MG/100ML; MG/100ML
30 INJECTION, SOLUTION INTRAVENOUS CONTINUOUS
OUTPATIENT
Start: 2024-08-17

## 2024-08-26 PROBLEM — Z12.11 ENCOUNTER FOR SCREENING COLONOSCOPY: Status: ACTIVE | Noted: 2024-08-16

## 2024-08-28 ENCOUNTER — TELEPHONE (OUTPATIENT)
Dept: FAMILY MEDICINE CLINIC | Facility: CLINIC | Age: 69
End: 2024-08-28
Payer: MEDICARE

## 2024-08-28 NOTE — TELEPHONE ENCOUNTER
RELAY----- Message from Zoe Mohr sent at 8/27/2024  4:59 PM EDT -----  Please remind her to get these labs done.  ----- Message -----  From: Susan Rosas MA  Sent: 8/13/2024   4:01 PM EDT  To: Zoe Mohr MD

## 2024-08-29 ENCOUNTER — LAB (OUTPATIENT)
Dept: FAMILY MEDICINE CLINIC | Facility: CLINIC | Age: 69
End: 2024-08-29
Payer: MEDICARE

## 2024-08-29 LAB
25(OH)D3 SERPL-MCNC: 59.1 NG/ML (ref 30–100)
ALBUMIN SERPL-MCNC: 3.1 G/DL (ref 3.5–5.2)
ALBUMIN/GLOB SERPL: 0.7 G/DL
ALP SERPL-CCNC: 75 U/L (ref 39–117)
ALT SERPL W P-5'-P-CCNC: 39 U/L (ref 1–33)
ANION GAP SERPL CALCULATED.3IONS-SCNC: 12.2 MMOL/L (ref 5–15)
AST SERPL-CCNC: 106 U/L (ref 1–32)
BASOPHILS # BLD AUTO: 0.09 10*3/MM3 (ref 0–0.2)
BASOPHILS NFR BLD AUTO: 1.4 % (ref 0–1.5)
BILIRUB SERPL-MCNC: 1.7 MG/DL (ref 0–1.2)
BUN SERPL-MCNC: 4 MG/DL (ref 8–23)
BUN/CREAT SERPL: 4.1 (ref 7–25)
CALCIUM SPEC-SCNC: 8.8 MG/DL (ref 8.6–10.5)
CHLORIDE SERPL-SCNC: 103 MMOL/L (ref 98–107)
CHOLEST SERPL-MCNC: 141 MG/DL (ref 0–200)
CO2 SERPL-SCNC: 24.8 MMOL/L (ref 22–29)
CREAT SERPL-MCNC: 0.98 MG/DL (ref 0.57–1)
DEPRECATED RDW RBC AUTO: 44.3 FL (ref 37–54)
EGFRCR SERPLBLD CKD-EPI 2021: 61.8 ML/MIN/1.73
EOSINOPHIL # BLD AUTO: 0.28 10*3/MM3 (ref 0–0.4)
EOSINOPHIL NFR BLD AUTO: 4.4 % (ref 0.3–6.2)
ERYTHROCYTE [DISTWIDTH] IN BLOOD BY AUTOMATED COUNT: 12.6 % (ref 12.3–15.4)
GLOBULIN UR ELPH-MCNC: 4.4 GM/DL
GLUCOSE SERPL-MCNC: 105 MG/DL (ref 65–99)
HCT VFR BLD AUTO: 34.3 % (ref 34–46.6)
HCV AB SER QL: NORMAL
HDLC SERPL-MCNC: 15 MG/DL (ref 40–60)
HGB BLD-MCNC: 11.7 G/DL (ref 12–15.9)
HIV 1+2 AB+HIV1 P24 AG SERPL QL IA: NORMAL
IMM GRANULOCYTES # BLD AUTO: 0.01 10*3/MM3 (ref 0–0.05)
IMM GRANULOCYTES NFR BLD AUTO: 0.2 % (ref 0–0.5)
LDLC SERPL CALC-MCNC: 106 MG/DL (ref 0–100)
LDLC/HDLC SERPL: 6.99 {RATIO}
LYMPHOCYTES # BLD AUTO: 2.11 10*3/MM3 (ref 0.7–3.1)
LYMPHOCYTES NFR BLD AUTO: 32.8 % (ref 19.6–45.3)
MAGNESIUM SERPL-MCNC: 1.6 MG/DL (ref 1.6–2.4)
MCH RBC QN AUTO: 33.1 PG (ref 26.6–33)
MCHC RBC AUTO-ENTMCNC: 34.1 G/DL (ref 31.5–35.7)
MCV RBC AUTO: 96.9 FL (ref 79–97)
MONOCYTES # BLD AUTO: 0.72 10*3/MM3 (ref 0.1–0.9)
MONOCYTES NFR BLD AUTO: 11.2 % (ref 5–12)
NEUTROPHILS NFR BLD AUTO: 3.22 10*3/MM3 (ref 1.7–7)
NEUTROPHILS NFR BLD AUTO: 50 % (ref 42.7–76)
PLATELET # BLD AUTO: 203 10*3/MM3 (ref 140–450)
PMV BLD AUTO: 13.5 FL (ref 6–12)
POTASSIUM SERPL-SCNC: 3.8 MMOL/L (ref 3.5–5.2)
PROT SERPL-MCNC: 7.5 G/DL (ref 6–8.5)
RBC # BLD AUTO: 3.54 10*6/MM3 (ref 3.77–5.28)
SODIUM SERPL-SCNC: 140 MMOL/L (ref 136–145)
T4 FREE SERPL-MCNC: 1.69 NG/DL (ref 0.93–1.7)
TRIGL SERPL-MCNC: 106 MG/DL (ref 0–150)
TSH SERPL DL<=0.05 MIU/L-ACNC: 5.03 UIU/ML (ref 0.27–4.2)
URATE SERPL-MCNC: 7.5 MG/DL (ref 2.4–5.7)
VLDLC SERPL-MCNC: 20 MG/DL (ref 5–40)
WBC NRBC COR # BLD AUTO: 6.43 10*3/MM3 (ref 3.4–10.8)

## 2024-08-29 PROCEDURE — 80061 LIPID PANEL: CPT | Performed by: PREVENTIVE MEDICINE

## 2024-08-29 PROCEDURE — 87086 URINE CULTURE/COLONY COUNT: CPT | Performed by: PREVENTIVE MEDICINE

## 2024-08-29 PROCEDURE — 82306 VITAMIN D 25 HYDROXY: CPT | Performed by: PREVENTIVE MEDICINE

## 2024-08-29 PROCEDURE — G0432 EIA HIV-1/HIV-2 SCREEN: HCPCS | Performed by: PREVENTIVE MEDICINE

## 2024-08-29 PROCEDURE — 85025 COMPLETE CBC W/AUTO DIFF WBC: CPT | Performed by: PREVENTIVE MEDICINE

## 2024-08-29 PROCEDURE — 83735 ASSAY OF MAGNESIUM: CPT | Performed by: PREVENTIVE MEDICINE

## 2024-08-29 PROCEDURE — 80053 COMPREHEN METABOLIC PANEL: CPT | Performed by: PREVENTIVE MEDICINE

## 2024-08-29 PROCEDURE — 84439 ASSAY OF FREE THYROXINE: CPT | Performed by: PREVENTIVE MEDICINE

## 2024-08-29 PROCEDURE — 84550 ASSAY OF BLOOD/URIC ACID: CPT | Performed by: PREVENTIVE MEDICINE

## 2024-08-29 PROCEDURE — 86803 HEPATITIS C AB TEST: CPT | Performed by: PREVENTIVE MEDICINE

## 2024-08-29 PROCEDURE — 84443 ASSAY THYROID STIM HORMONE: CPT | Performed by: PREVENTIVE MEDICINE

## 2024-08-30 LAB — BACTERIA SPEC AEROBE CULT: NORMAL

## 2024-08-31 DIAGNOSIS — F41.9 ANXIETY: ICD-10-CM

## 2024-09-02 ENCOUNTER — TELEPHONE (OUTPATIENT)
Dept: FAMILY MEDICINE CLINIC | Facility: CLINIC | Age: 69
End: 2024-09-02
Payer: MEDICARE

## 2024-09-02 DIAGNOSIS — I10 PRIMARY HYPERTENSION: Primary | ICD-10-CM

## 2024-09-02 NOTE — TELEPHONE ENCOUNTER
Relay  ----- Message from Zoe Mohr sent at 9/2/2024  8:14 AM EDT -----  Liver functions are elevated and we will see if her stopping drinking prior to our last visit changes the liver function numbers they should be repeated the middle of this month with fasting labs.  Orders have been placed mild anemia so make sure you are eating foods that are rich in iron and vitamin C and we will monitor to her that as well.  Glucose was 105 so watch her carbs and keep up your walking.  Bad cholesterol is 106 and goal is below 100 and some would say below 70 so watch her saturated fats and increase your walking and continue your Repatha.  Uric acid is slightly elevated but I hate to put you on amlodipine until we see improvement in your liver function.  Thyroid stimulating hormone was elevated but the actual active ingredient called T4 was normal so we will continue to monitor that in a few months as well.  Call if any other questions or concerns and let us know about the above

## 2024-09-03 RX ORDER — HYDROXYZINE HYDROCHLORIDE 25 MG/1
25 TABLET, FILM COATED ORAL 3 TIMES DAILY PRN
Qty: 90 TABLET | Refills: 1 | Status: SHIPPED | OUTPATIENT
Start: 2024-09-03

## 2024-09-03 RX ORDER — POLYETHYLENE GLYCOL 3350, SODIUM SULFATE ANHYDROUS, SODIUM BICARBONATE, SODIUM CHLORIDE, POTASSIUM CHLORIDE 236; 22.74; 6.74; 5.86; 2.97 G/4L; G/4L; G/4L; G/4L; G/4L
4 POWDER, FOR SOLUTION ORAL ONCE
Qty: 4000 ML | Refills: 0 | Status: SHIPPED | OUTPATIENT
Start: 2024-09-03 | End: 2024-09-03

## 2024-10-15 ENCOUNTER — TELEPHONE (OUTPATIENT)
Age: 69
End: 2024-10-15
Payer: MEDICARE

## 2024-10-15 NOTE — TELEPHONE ENCOUNTER
PT IS AWARE THAT COLONOSCOPY IS [facility] FOR TOMORROW 10/16. PREP HAS BEEN PICKED UP FROM PHARMACY.

## 2024-10-16 ENCOUNTER — APPOINTMENT (OUTPATIENT)
Dept: NUCLEAR MEDICINE | Facility: HOSPITAL | Age: 69
End: 2024-10-16
Payer: MEDICARE

## 2024-10-16 ENCOUNTER — ANESTHESIA (OUTPATIENT)
Dept: GASTROENTEROLOGY | Facility: HOSPITAL | Age: 69
End: 2024-10-16

## 2024-10-16 ENCOUNTER — ANESTHESIA EVENT (OUTPATIENT)
Dept: GASTROENTEROLOGY | Facility: HOSPITAL | Age: 69
End: 2024-10-16

## 2024-10-16 ENCOUNTER — HOSPITAL ENCOUNTER (OUTPATIENT)
Facility: HOSPITAL | Age: 69
Setting detail: HOSPITAL OUTPATIENT SURGERY
Discharge: HOME | End: 2024-10-16
Attending: STUDENT IN AN ORGANIZED HEALTH CARE EDUCATION/TRAINING PROGRAM | Admitting: STUDENT IN AN ORGANIZED HEALTH CARE EDUCATION/TRAINING PROGRAM
Payer: MEDICARE

## 2024-10-16 ENCOUNTER — APPOINTMENT (OUTPATIENT)
Dept: GENERAL RADIOLOGY | Facility: HOSPITAL | Age: 69
End: 2024-10-16
Payer: MEDICARE

## 2024-10-16 ENCOUNTER — HOSPITAL ENCOUNTER (OUTPATIENT)
Facility: HOSPITAL | Age: 69
Discharge: HOME | End: 2024-10-17
Attending: EMERGENCY MEDICINE | Admitting: EMERGENCY MEDICINE
Payer: MEDICARE

## 2024-10-16 VITALS
WEIGHT: 138 LBS | RESPIRATION RATE: 22 BRPM | BODY MASS INDEX: 27.09 KG/M2 | TEMPERATURE: 99.1 F | SYSTOLIC BLOOD PRESSURE: 175 MMHG | OXYGEN SATURATION: 99 % | HEART RATE: 75 BPM | HEIGHT: 60 IN | DIASTOLIC BLOOD PRESSURE: 71 MMHG

## 2024-10-16 DIAGNOSIS — Z12.11 ENCOUNTER FOR SCREENING COLONOSCOPY: ICD-10-CM

## 2024-10-16 DIAGNOSIS — I20.89 ANGINAL EQUIVALENT: ICD-10-CM

## 2024-10-16 DIAGNOSIS — Z12.11 SCREENING FOR COLON CANCER: ICD-10-CM

## 2024-10-16 DIAGNOSIS — M79.602 LEFT ARM PAIN: Primary | ICD-10-CM

## 2024-10-16 LAB
ALBUMIN SERPL-MCNC: 3.3 G/DL (ref 3.5–5.2)
ALBUMIN/GLOB SERPL: 0.6 G/DL
ALP SERPL-CCNC: 124 U/L (ref 39–117)
ALT SERPL W P-5'-P-CCNC: 32 U/L (ref 1–33)
ANION GAP SERPL CALCULATED.3IONS-SCNC: 12.2 MMOL/L (ref 5–15)
AST SERPL-CCNC: 128 U/L (ref 1–32)
BASOPHILS # BLD AUTO: 0.1 10*3/MM3 (ref 0–0.2)
BASOPHILS NFR BLD AUTO: 1.3 % (ref 0–1.5)
BH CV NUCLEAR PRIOR STUDY: 3
BH CV REST NUCLEAR ISOTOPE DOSE: 11 MCI
BH CV STRESS BP STAGE 1: NORMAL
BH CV STRESS BP STAGE 2: NORMAL
BH CV STRESS BP STAGE 3: NORMAL
BH CV STRESS COMMENTS STAGE 1: NORMAL
BH CV STRESS COMMENTS STAGE 2: NORMAL
BH CV STRESS DOSE REGADENOSON STAGE 1: 0.4
BH CV STRESS DURATION MIN STAGE 1: 0
BH CV STRESS DURATION MIN STAGE 2: 4
BH CV STRESS DURATION SEC STAGE 1: 10
BH CV STRESS DURATION SEC STAGE 2: 0
BH CV STRESS HR STAGE 1: 86
BH CV STRESS HR STAGE 2: 86
BH CV STRESS HR STAGE 3: 85
BH CV STRESS NUCLEAR ISOTOPE DOSE: 33 MCI
BH CV STRESS PROTOCOL 1: NORMAL
BH CV STRESS RECOVERY BP: NORMAL MMHG
BH CV STRESS RECOVERY HR: 82 BPM
BH CV STRESS STAGE 1: 1
BH CV STRESS STAGE 2: 2
BH CV STRESS STAGE 3: 3
BILIRUB SERPL-MCNC: 2.2 MG/DL (ref 0–1.2)
BUN SERPL-MCNC: 4 MG/DL (ref 8–23)
BUN/CREAT SERPL: 4.5 (ref 7–25)
CALCIUM SPEC-SCNC: 8.6 MG/DL (ref 8.6–10.5)
CHLORIDE SERPL-SCNC: 103 MMOL/L (ref 98–107)
CO2 SERPL-SCNC: 24.8 MMOL/L (ref 22–29)
CREAT SERPL-MCNC: 0.89 MG/DL (ref 0.57–1)
DEPRECATED RDW RBC AUTO: 54.6 FL (ref 37–54)
EGFRCR SERPLBLD CKD-EPI 2021: 69.4 ML/MIN/1.73
EOSINOPHIL # BLD AUTO: 0.11 10*3/MM3 (ref 0–0.4)
EOSINOPHIL NFR BLD AUTO: 1.5 % (ref 0.3–6.2)
ERYTHROCYTE [DISTWIDTH] IN BLOOD BY AUTOMATED COUNT: 15.8 % (ref 12.3–15.4)
GEN 5 2HR TROPONIN T REFLEX: 6 NG/L
GLOBULIN UR ELPH-MCNC: 5.2 GM/DL
GLUCOSE SERPL-MCNC: 114 MG/DL (ref 65–99)
HCT VFR BLD AUTO: 36.8 % (ref 34–46.6)
HGB BLD-MCNC: 12.6 G/DL (ref 12–15.9)
IMM GRANULOCYTES # BLD AUTO: 0.01 10*3/MM3 (ref 0–0.05)
IMM GRANULOCYTES NFR BLD AUTO: 0.1 % (ref 0–0.5)
LV EF NUC BP: 88 %
LYMPHOCYTES # BLD AUTO: 1.84 10*3/MM3 (ref 0.7–3.1)
LYMPHOCYTES NFR BLD AUTO: 24.8 % (ref 19.6–45.3)
MAXIMAL PREDICTED HEART RATE: 149 BPM
MCH RBC QN AUTO: 32.2 PG (ref 26.6–33)
MCHC RBC AUTO-ENTMCNC: 34.2 G/DL (ref 31.5–35.7)
MCV RBC AUTO: 94.1 FL (ref 79–97)
MONOCYTES # BLD AUTO: 0.85 10*3/MM3 (ref 0.1–0.9)
MONOCYTES NFR BLD AUTO: 11.4 % (ref 5–12)
NEUTROPHILS NFR BLD AUTO: 4.52 10*3/MM3 (ref 1.7–7)
NEUTROPHILS NFR BLD AUTO: 60.9 % (ref 42.7–76)
NRBC BLD AUTO-RTO: 0 /100 WBC (ref 0–0.2)
PERCENT MAX PREDICTED HR: 57.72 %
PLAT MORPH BLD: NORMAL
PLATELET # BLD AUTO: 134 10*3/MM3 (ref 140–450)
PMV BLD AUTO: 12.7 FL (ref 6–12)
POTASSIUM SERPL-SCNC: 3.7 MMOL/L (ref 3.5–5.2)
PROT SERPL-MCNC: 8.5 G/DL (ref 6–8.5)
RBC # BLD AUTO: 3.91 10*6/MM3 (ref 3.77–5.28)
RBC MORPH BLD: NORMAL
SODIUM SERPL-SCNC: 140 MMOL/L (ref 136–145)
STRESS BASELINE BP: NORMAL MMHG
STRESS BASELINE HR: 82 BPM
STRESS PERCENT HR: 68 %
STRESS POST PEAK BP: NORMAL MMHG
STRESS POST PEAK HR: 86 BPM
STRESS TARGET HR: 127 BPM
TROPONIN T DELTA: -3 NG/L
TROPONIN T SERPL HS-MCNC: 9 NG/L
WBC MORPH BLD: NORMAL
WBC NRBC COR # BLD AUTO: 7.43 10*3/MM3 (ref 3.4–10.8)

## 2024-10-16 PROCEDURE — 78452 HT MUSCLE IMAGE SPECT MULT: CPT | Performed by: INTERNAL MEDICINE

## 2024-10-16 PROCEDURE — 99285 EMERGENCY DEPT VISIT HI MDM: CPT

## 2024-10-16 PROCEDURE — 25010000002 LABETALOL 5 MG/ML SOLUTION: Performed by: EMERGENCY MEDICINE

## 2024-10-16 PROCEDURE — 93018 CV STRESS TEST I&R ONLY: CPT | Performed by: INTERNAL MEDICINE

## 2024-10-16 PROCEDURE — 93005 ELECTROCARDIOGRAM TRACING: CPT | Performed by: EMERGENCY MEDICINE

## 2024-10-16 PROCEDURE — G0378 HOSPITAL OBSERVATION PER HR: HCPCS

## 2024-10-16 PROCEDURE — 93017 CV STRESS TEST TRACING ONLY: CPT

## 2024-10-16 PROCEDURE — 84484 ASSAY OF TROPONIN QUANT: CPT | Performed by: EMERGENCY MEDICINE

## 2024-10-16 PROCEDURE — A9502 TC99M TETROFOSMIN: HCPCS | Performed by: EMERGENCY MEDICINE

## 2024-10-16 PROCEDURE — 96375 TX/PRO/DX INJ NEW DRUG ADDON: CPT

## 2024-10-16 PROCEDURE — 99222 1ST HOSP IP/OBS MODERATE 55: CPT | Performed by: INTERNAL MEDICINE

## 2024-10-16 PROCEDURE — 73030 X-RAY EXAM OF SHOULDER: CPT

## 2024-10-16 PROCEDURE — 72050 X-RAY EXAM NECK SPINE 4/5VWS: CPT

## 2024-10-16 PROCEDURE — 78452 HT MUSCLE IMAGE SPECT MULT: CPT

## 2024-10-16 PROCEDURE — 96376 TX/PRO/DX INJ SAME DRUG ADON: CPT

## 2024-10-16 PROCEDURE — 85007 BL SMEAR W/DIFF WBC COUNT: CPT | Performed by: EMERGENCY MEDICINE

## 2024-10-16 PROCEDURE — 93005 ELECTROCARDIOGRAM TRACING: CPT | Performed by: ANESTHESIOLOGY

## 2024-10-16 PROCEDURE — 0 TECHNETIUM TETROFOSMIN KIT: Performed by: EMERGENCY MEDICINE

## 2024-10-16 PROCEDURE — 93010 ELECTROCARDIOGRAM REPORT: CPT | Performed by: INTERNAL MEDICINE

## 2024-10-16 PROCEDURE — 71045 X-RAY EXAM CHEST 1 VIEW: CPT

## 2024-10-16 PROCEDURE — 80053 COMPREHEN METABOLIC PANEL: CPT | Performed by: EMERGENCY MEDICINE

## 2024-10-16 PROCEDURE — 25010000002 ONDANSETRON PER 1 MG: Performed by: PHYSICIAN ASSISTANT

## 2024-10-16 PROCEDURE — 96374 THER/PROPH/DIAG INJ IV PUSH: CPT

## 2024-10-16 PROCEDURE — 97161 PT EVAL LOW COMPLEX 20 MIN: CPT | Performed by: PHYSICAL THERAPIST

## 2024-10-16 PROCEDURE — 85025 COMPLETE CBC W/AUTO DIFF WBC: CPT | Performed by: EMERGENCY MEDICINE

## 2024-10-16 PROCEDURE — G0463 HOSPITAL OUTPT CLINIC VISIT: HCPCS | Performed by: STUDENT IN AN ORGANIZED HEALTH CARE EDUCATION/TRAINING PROGRAM

## 2024-10-16 PROCEDURE — 25010000002 REGADENOSON 0.4 MG/5ML SOLUTION: Performed by: EMERGENCY MEDICINE

## 2024-10-16 RX ORDER — PANTOPRAZOLE SODIUM 40 MG/10ML
40 INJECTION, POWDER, LYOPHILIZED, FOR SOLUTION INTRAVENOUS
Status: DISCONTINUED | OUTPATIENT
Start: 2024-10-16 | End: 2024-10-17 | Stop reason: HOSPADM

## 2024-10-16 RX ORDER — ONDANSETRON 2 MG/ML
4 INJECTION INTRAMUSCULAR; INTRAVENOUS EVERY 6 HOURS PRN
Status: DISCONTINUED | OUTPATIENT
Start: 2024-10-16 | End: 2024-10-16 | Stop reason: SDUPTHER

## 2024-10-16 RX ORDER — SODIUM CHLORIDE, SODIUM LACTATE, POTASSIUM CHLORIDE, CALCIUM CHLORIDE 600; 310; 30; 20 MG/100ML; MG/100ML; MG/100ML; MG/100ML
30 INJECTION, SOLUTION INTRAVENOUS CONTINUOUS
Status: DISCONTINUED | OUTPATIENT
Start: 2024-10-16 | End: 2024-10-17 | Stop reason: HOSPADM

## 2024-10-16 RX ORDER — MONTELUKAST SODIUM 10 MG/1
10 TABLET ORAL NIGHTLY
Status: DISCONTINUED | OUTPATIENT
Start: 2024-10-16 | End: 2024-10-17 | Stop reason: HOSPADM

## 2024-10-16 RX ORDER — BISACODYL 10 MG
10 SUPPOSITORY, RECTAL RECTAL DAILY PRN
Status: DISCONTINUED | OUTPATIENT
Start: 2024-10-16 | End: 2024-10-17 | Stop reason: HOSPADM

## 2024-10-16 RX ORDER — SODIUM CHLORIDE 0.9 % (FLUSH) 0.9 %
10 SYRINGE (ML) INJECTION AS NEEDED
Status: DISCONTINUED | OUTPATIENT
Start: 2024-10-16 | End: 2024-10-17 | Stop reason: HOSPADM

## 2024-10-16 RX ORDER — AMOXICILLIN 250 MG
2 CAPSULE ORAL 2 TIMES DAILY PRN
Status: DISCONTINUED | OUTPATIENT
Start: 2024-10-16 | End: 2024-10-17 | Stop reason: HOSPADM

## 2024-10-16 RX ORDER — ONDANSETRON 2 MG/ML
4 INJECTION INTRAMUSCULAR; INTRAVENOUS EVERY 6 HOURS PRN
Status: DISCONTINUED | OUTPATIENT
Start: 2024-10-16 | End: 2024-10-17 | Stop reason: HOSPADM

## 2024-10-16 RX ORDER — BISACODYL 5 MG/1
5 TABLET, DELAYED RELEASE ORAL DAILY PRN
Status: DISCONTINUED | OUTPATIENT
Start: 2024-10-16 | End: 2024-10-17 | Stop reason: HOSPADM

## 2024-10-16 RX ORDER — CLONIDINE HYDROCHLORIDE 0.1 MG/1
0.2 TABLET ORAL 2 TIMES DAILY
Status: DISCONTINUED | OUTPATIENT
Start: 2024-10-16 | End: 2024-10-17 | Stop reason: HOSPADM

## 2024-10-16 RX ORDER — ATENOLOL 50 MG/1
50 TABLET ORAL
Status: DISCONTINUED | OUTPATIENT
Start: 2024-10-16 | End: 2024-10-17 | Stop reason: HOSPADM

## 2024-10-16 RX ORDER — ONDANSETRON 4 MG/1
4 TABLET, ORALLY DISINTEGRATING ORAL EVERY 6 HOURS PRN
Status: DISCONTINUED | OUTPATIENT
Start: 2024-10-16 | End: 2024-10-17 | Stop reason: HOSPADM

## 2024-10-16 RX ORDER — CHLORTHALIDONE 25 MG/1
25 TABLET ORAL
Status: DISCONTINUED | OUTPATIENT
Start: 2024-10-16 | End: 2024-10-17 | Stop reason: HOSPADM

## 2024-10-16 RX ORDER — REGADENOSON 0.08 MG/ML
0.4 INJECTION, SOLUTION INTRAVENOUS
Status: COMPLETED | OUTPATIENT
Start: 2024-10-16 | End: 2024-10-16

## 2024-10-16 RX ORDER — ALUMINA, MAGNESIA, AND SIMETHICONE 2400; 2400; 240 MG/30ML; MG/30ML; MG/30ML
15 SUSPENSION ORAL EVERY 6 HOURS PRN
Status: DISCONTINUED | OUTPATIENT
Start: 2024-10-16 | End: 2024-10-17 | Stop reason: HOSPADM

## 2024-10-16 RX ORDER — LABETALOL HYDROCHLORIDE 5 MG/ML
10 INJECTION, SOLUTION INTRAVENOUS ONCE
Status: COMPLETED | OUTPATIENT
Start: 2024-10-16 | End: 2024-10-16

## 2024-10-16 RX ORDER — POLYETHYLENE GLYCOL 3350 17 G/17G
17 POWDER, FOR SOLUTION ORAL DAILY PRN
Status: DISCONTINUED | OUTPATIENT
Start: 2024-10-16 | End: 2024-10-17 | Stop reason: HOSPADM

## 2024-10-16 RX ORDER — CLONIDINE HYDROCHLORIDE 0.2 MG/1
0.2 TABLET ORAL 2 TIMES DAILY
COMMUNITY

## 2024-10-16 RX ORDER — ASPIRIN 325 MG
325 TABLET ORAL DAILY
Status: DISCONTINUED | OUTPATIENT
Start: 2024-10-16 | End: 2024-10-17 | Stop reason: HOSPADM

## 2024-10-16 RX ADMIN — PANTOPRAZOLE SODIUM 40 MG: 40 INJECTION, POWDER, FOR SOLUTION INTRAVENOUS at 16:03

## 2024-10-16 RX ADMIN — ONDANSETRON 4 MG: 2 INJECTION, SOLUTION INTRAMUSCULAR; INTRAVENOUS at 12:52

## 2024-10-16 RX ADMIN — CHLORTHALIDONE 25 MG: 25 TABLET ORAL at 16:03

## 2024-10-16 RX ADMIN — ATENOLOL 50 MG: 50 TABLET ORAL at 16:03

## 2024-10-16 RX ADMIN — CLONIDINE HYDROCHLORIDE 0.2 MG: 0.1 TABLET ORAL at 21:00

## 2024-10-16 RX ADMIN — LABETALOL HYDROCHLORIDE 10 MG: 5 INJECTION, SOLUTION INTRAVENOUS at 08:09

## 2024-10-16 RX ADMIN — TETROFOSMIN 1 DOSE: 1.38 INJECTION, POWDER, LYOPHILIZED, FOR SOLUTION INTRAVENOUS at 14:05

## 2024-10-16 RX ADMIN — TETROFOSMIN 1 DOSE: 1.38 INJECTION, POWDER, LYOPHILIZED, FOR SOLUTION INTRAVENOUS at 10:54

## 2024-10-16 RX ADMIN — REGADENOSON 0.4 MG: 0.08 INJECTION, SOLUTION INTRAVENOUS at 14:05

## 2024-10-16 RX ADMIN — MONTELUKAST 10 MG: 10 TABLET, FILM COATED ORAL at 21:00

## 2024-10-16 RX ADMIN — ASPIRIN 325 MG ORAL TABLET 325 MG: 325 PILL ORAL at 16:03

## 2024-10-16 NOTE — PLAN OF CARE
Goal Outcome Evaluation:    Patient will be NPO after MN for colonoscopy in the AM with Dr Lindsay

## 2024-10-16 NOTE — THERAPY EVALUATION
Patient Name: Priscilla Fragoso  : 1955    MRN: [id number]                              Today's Date: 10/16/2024       Admit Date: 10/16/2024    Visit Dx:     ICD-10-CM ICD-9-CM   1. Left arm pain  M79.602 729.5   2. Anginal equivalent  I20.89 413.9     Patient Active Problem List   Diagnosis    Mixed hyperlipidemia    Primary hypertension    Prediabetes    History of stroke    History of MI (myocardial infarction)    Vitamin D deficiency    Gastroesophageal reflux disease without esophagitis    Chronic midline low back pain with bilateral sciatica    History of seizure    Recurrent major depressive disorder, in remission    Closed wedge compression fracture of T7 vertebra with routine healing    Epigastric pain    Nausea and vomiting    LLQ pain    Cerebral microvascular disease    Bilateral posterior capsular opacification    Dry eyes    Presbyopia    Bowel habit changes    Overweight with body mass index (BMI) of 27 to 27.9 in adult    Encounter for screening colonoscopy    Left arm pain     Past Medical History:   Diagnosis Date    Anesthesia complication     stated was given too much    Asthma     GERD (gastroesophageal reflux disease)     GI bleed     Headache     Hyperlipidemia     Hypertension     Peptic ulceration     PONV (postoperative nausea and vomiting)      Past Surgical History:   Procedure Laterality Date    EYE SURGERY      TUBAL ABDOMINAL LIGATION        General Information       Row Name 10/16/24 1037          Physical Therapy Time and Intention    Document Type evaluation  -AD     Mode of Treatment physical therapy  -AD       Row Name 10/16/24 1037          General Information    Patient Profile Reviewed yes  -AD     Prior Level of Function independent:;bed mobility;ADL's;driving;all household mobility;gait  -AD     Existing Precautions/Restrictions no known precautions/restrictions  -AD     Barriers to Rehab none identified  -AD       Row Name 10/16/24 1037          Living Environment     People in Home alone  -AD       Row Name 10/16/24 1037          Home Main Entrance    Number of Stairs, Main Entrance one  -AD       Row Name 10/16/24 1037          Safety Issues/Impairments Affecting Functional Mobility    Impairments Affecting Function (Mobility) balance;endurance/activity tolerance;strength  -AD               User Key  (r) = Recorded By, (t) = Taken By, (c) = Cosigned By      Initials Name Provider Type    Linsey Jackson, PT Physical Therapist                   Mobility       Row Name 10/16/24 1040          Bed Mobility    Bed Mobility bed mobility (all) activities  -AD     All Activities, [place] (Bed Mobility) contact guard  -AD       Row Name 10/16/24 1040          Sit-Stand Transfer    Sit-Stand [place] (Transfers) contact guard  -AD       Row Name 10/16/24 1040          Gait/Stairs (Locomotion)    [place] Level (Gait) contact guard  -AD     Patient was able to Ambulate yes  -AD     Distance in Feet (Gait) 10  -AD               User Key  (r) = Recorded By, (t) = Taken By, (c) = Cosigned By      Initials Name Provider Type    Linsey Jackson, DEEP Physical Therapist                   Obj/Interventions       Row Name 10/16/24 1040          Range of Motion Comprehensive    General Range of Motion no range of motion deficits identified  -AD       Row Name 10/16/24 1040          Strength Comprehensive (MMT)    Comment, General Manual Muscle Testing (MMT) Assessment 4/5 grossly BLE  -AD               User Key  (r) = Recorded By, (t) = Taken By, (c) = Cosigned By      Initials Name Provider Type    Linsey Jackson, DEEP Physical Therapist                   Goals/Plan       Row Name 10/16/24 1208          Bed Mobility Goal 1 (PT)    Activity/Assistive Device (Bed Mobility Goal 1, PT) bed mobility activities, all  -AD     [place] Level/Cues Needed (Bed Mobility Goal 1, PT) independent  -AD     Time Frame (Bed Mobility Goal 1, PT) long term goal (LTG)  -AD       Row Name  10/16/24 1208          Transfer Goal 1 (PT)    Activity/Assistive Device (Transfer Goal 1, PT) transfers, all  -AD     [place] Level/Cues Needed (Transfer Goal 1, PT) independent  -AD     Time Frame (Transfer Goal 1, PT) long term goal (LTG)  -AD       Row Name 10/16/24 1208          Gait Training Goal 1 (PT)    Activity/Assistive Device (Gait Training Goal 1, PT) gait (walking locomotion);assistive device use  -AD     [place] Level (Gait Training Goal 1, PT) modified independence  -AD     Distance (Gait Training Goal 1, PT) 50  -AD     Time Frame (Gait Training Goal 1, PT) long term goal (LTG)  -AD       Row Name 10/16/24 1208          Therapy Assessment/Plan (PT)    Planned Therapy Interventions (PT) balance training;bed mobility training;gait training;neuromuscular re-education;ROM (range of motion);strengthening;stretching;transfer training;patient/family education;postural re-education  -AD               User Key  (r) = Recorded By, (t) = Taken By, (c) = Cosigned By      Initials Name Provider Type    AD Linsey Yanes, PT Physical Therapist                   Clinical Impression       Row Name 10/16/24 1040          Pain    Pretreatment Pain Rating 5/10  -AD     Posttreatment Pain Rating 5/10  -AD     Pain Location neck  -AD     Pain Side/Orientation left  -AD       Row Name 10/16/24 1040          Plan of Care Review    Plan of Care Reviewed With patient  -AD     Progress no change  -AD     Outcome Evaluation Pt is a 69-year-old female who was present outpatient for a colonoscopy this morning when she suddenly had pain that shot in her neck down her left arm which has now resolved. They did an EKG and they sent her to the ER.  She states she fell a couple weeks ago and she has been having some intermittent neck and shoulder pain since that time. Pt in observation for cardiac workup. PLOF is (I) with ADLs, household ambulation with cane, and drives. She lives alone in a [address] home and has family  support from sister. Today she was able to perform bed mobility and transfers with CGA and was evaluated for musculoskeletal pain in left neck/shoulder area and found to have left cervical radiculopathy and muscle spasms in UT/levator area. She was given HEP and demonstrated good understanding. Pt did not want to participate in OPPT at this time.  -AD       Row Name 10/16/24 1040          Therapy Assessment/Plan (PT)    Criteria for Skilled Interventions Met (PT) yes;skilled treatment is necessary  -AD     Therapy Frequency (PT) 3 times/wk  -AD               User Key  (r) = Recorded By, (t) = Taken By, (c) = Cosigned By      Initials Name Provider Type    AD Linsey Yanes PT Physical Therapist                   Outcome Measures    No documentation.                                Physical Therapy Education       Title: PT OT SLP Therapies (In Progress)       Topic: Physical Therapy (In Progress)       Point: Mobility training (Done)       Learning Progress Summary            Patient Acceptance, E, VU by AD at 10/16/2024 1209                      Point: Home exercise program (Not Started)       Learner Progress:  Not documented in this visit.              Point: Body mechanics (Not Started)       Learner Progress:  Not documented in this visit.              Point: Precautions (Not Started)       Learner Progress:  Not documented in this visit.                              User Key       Initials Effective Dates Name Provider Type Discipline    AD 06/03/24 -  Linsey Yanes PT Physical Therapist PT                  PT Recommendation and Plan  Planned Therapy Interventions (PT): balance training, bed mobility training, gait training, neuromuscular re-education, ROM (range of motion), strengthening, stretching, transfer training, patient/family education, postural re-education  Progress: no change  Outcome Evaluation: Pt is a 69-year-old female who was present outpatient for a colonoscopy this morning when she  suddenly had pain that shot in her neck down her left arm which has now resolved. They did an EKG and they sent her to the ER.  She states she fell a couple weeks ago and she has been having some intermittent neck and shoulder pain since that time. Pt in observation for cardiac workup. PLOF is (I) with ADLs, household ambulation with cane, and drives. She lives alone in a [address] home and has family support from sister. Today she was able to perform bed mobility and transfers with CGA and was evaluated for musculoskeletal pain in left neck/shoulder area and found to have left cervical radiculopathy and muscle spasms in UT/levator area. She was given HEP and demonstrated good understanding. Pt did not want to participate in OPPT at this time.     Time Calculation:   PT Evaluation Complexity  History, PT Evaluation Complexity: 1-2 personal factors and/or comorbidities  Examination of Body Systems (PT Eval Complexity): 1-2 elements  Clinical Presentation (PT Evaluation Complexity): stable  Clinical Decision Making (PT Evaluation Complexity): low complexity  Overall Complexity (PT Evaluation Complexity): low complexity     PT Charges       Row Name 10/16/24 [facility]             Time Calculation    Start Time 0920  -AD      Stop Time 0950  -AD      Time Calculation (min) 30 min  -AD      PT Received On 10/16/24  -AD      PT - Next Appointment 10/17/24  -AD      PT Goal Re-Cert Due Date 10/30/24  -AD         Time Calculation- PT    Total Timed Code Minutes- PT 0 minute(s)  -AD                User Key  (r) = Recorded By, (t) = Taken By, (c) = Cosigned By      Initials Name Provider Type    AD Linsey Yanes, PT Physical Therapist                  Therapy Charges for Today       Code Description Service Date Service Provider Modifiers Qty    [id number]  PT EVAL LOW COMPLEXITY 4 10/16/2024 Linsey Yanes, PT GP 1               PT Discharge Summary  Anticipated Discharge Disposition (PT): home    Linsey Yanes  PT  10/16/2024

## 2024-10-16 NOTE — CONSULTS
CARDIOLOGY CONSULT:    Priscilla Fragoso  1955  female  [id number]      Referring Provider: Hospitalist  Reason for Consultation: Chest pain    Patient Care Team:  Zoe Mohr MD as PCP - General (Family Medicine)  Monae Elias, RN as Registered Nurse    Chief complaint chest pain    Subjective .     History of present illness:  Priscilla Fragoso is a 69 y.o. female with history of hypertension hyperlipidemia presented to the hospital with complaints of chest pain which radiated to the neck and arm and not rest or with any shortness of breath.  No complaint of any PND orthopnea.  No palpitations dizziness syncope.  No swelling of the feet.  Patient is taking her medicines regular.  Patient does not smoke.    Review of Systems   Constitutional: Negative for fever and malaise/fatigue.   HENT:  Negative for ear pain and nosebleeds.    Eyes:  Negative for blurred vision and double vision.   Cardiovascular:  Positive for chest pain. Negative for dyspnea on exertion and palpitations.   Respiratory:  Negative for cough and shortness of breath.    Skin:  Negative for rash.   Musculoskeletal:  Negative for joint pain.   Gastrointestinal:  Negative for abdominal pain, nausea and vomiting.   Neurological:  Negative for focal weakness and headaches.   Psychiatric/Behavioral:  Negative for depression. The patient is not nervous/anxious.    All other systems reviewed and are negative.      History  Past Medical History:   Diagnosis Date    Anesthesia complication     stated was given too much    Asthma     GERD (gastroesophageal reflux disease)     GI bleed     Headache     Hyperlipidemia     Hypertension     Peptic ulceration     PONV (postoperative nausea and vomiting)        Past Surgical History:   Procedure Laterality Date    EYE SURGERY      TUBAL ABDOMINAL LIGATION         History reviewed. No pertinent family history.    Social History     Tobacco Use    Smoking status: Former     Current packs/day: 0.00      Average packs/day: 3.0 packs/day for 15.1 years (45.4 ttl pk-yrs)     Types: Cigarettes     Start date:      Quit date: 1994     Years since quittin.6     Passive exposure: Past    Smokeless tobacco: Never   Vaping Use    Vaping status: Never Used   Substance Use Topics    Alcohol use: Not Currently    Drug use: Never        Medications Prior to Admission   Medication Sig Dispense Refill Last Dose/Taking    albuterol (ACCUNEB) 0.63 MG/3ML nebulizer solution Take 3 mL by nebulization Every Night. 100 each 3 Taking    albuterol sulfate  (90 Base) MCG/ACT inhaler Inhale 2 puffs Every 4 (Four) Hours As Needed for Wheezing. 54 g 3 Taking As Needed    aspirin 325 MG tablet Take 1 tablet by mouth Daily.   Taking    atenolol-chlorthalidone (TENORETIC) 50-25 MG per tablet TAKE 1 TABLET BY MOUTH DAILY 90 tablet 1 Taking    B-COMPLEX-C PO Take  by mouth.   Taking    cloNIDine (CATAPRES) 0.2 MG tablet TAKE 1 TABLET BY MOUTH TWICE DAILY 180 tablet 1 Taking    cyclobenzaprine (FLEXERIL) 5 MG tablet Take 1 tablet by mouth 3 (Three) Times a Day As Needed for Muscle Spasms. 90 tablet 1 Taking As Needed    D3 Maximum Strength 125 MCG (5000 UT) capsule capsule TAKE 1 CAPSULE BY MOUTH DAILY 90 capsule 1 Taking    esomeprazole (nexIUM) 20 MG capsule Take 1 capsule by mouth Every Morning Before Breakfast. 90 capsule 3 Taking    hydrOXYzine (ATARAX) 25 MG tablet TAKE 1 TABLET BY MOUTH THREE TIMES DAILY AS NEEDED FOR ITCHING 90 tablet 1 Taking As Needed    montelukast (SINGULAIR) 10 MG tablet TAKE 1 TABLET BY MOUTH EVERY NIGHT 90 tablet 3 Taking    Repatha solution prefilled syringe injection INJECT 1 MILLILITER UNDER THE SKIN AS DIRECTED EVERY 14 DAYS 6 mL 3 Past Week    cloNIDine (CATAPRES) 0.2 MG tablet Take 1 tablet by mouth 2 (Two) Times a Day.       famotidine (PEPCID) 20 MG tablet TAKE 1 TABLET BY MOUTH AT NIGHT AS NEEDED FOR HEARTBURN 90 tablet 1        Allergies: Codeine, Penicillins, Sulfa antibiotics, and  "Tetanus toxoids    Scheduled Meds:aspirin, 325 mg, Oral, Daily  atenolol, 50 mg, Oral, Q24H   And  chlorthalidone, 25 mg, Oral, Q24H  cloNIDine, 0.2 mg, Oral, BID  montelukast, 10 mg, Oral, Nightly  pantoprazole, 40 mg, Intravenous, Q AM      Continuous Infusions:   PRN Meds:.  aluminum-magnesium hydroxide-simethicone    senna-docusate sodium **AND** polyethylene glycol **AND** bisacodyl **AND** bisacodyl    influenza vaccine    ondansetron ODT **OR** ondansetron    [COMPLETED] Insert Peripheral IV **AND** sodium chloride    Objective     VITAL SIGNS  Vitals:    10/16/24 1129 10/16/24 1204 10/16/24 1218 10/16/24 1442   BP: (!) 190/87 (!) 171/101 (!) 196/91 (!) 192/83   BP Location:   Right arm Right arm   Patient Position:   Lying Lying   Pulse: 80 81 82    Resp:   18 17   Temp:   98.1 °F (36.7 °C)    TempSrc:   Oral    SpO2: 98% 98% 99%    Weight:   61.2 kg (134 lb 14.7 oz)    Height:   152.4 cm (60\")        Flowsheet Rows      Flowsheet Row First Filed Value   Admission Height 152.4 cm (60\") Documented at 10/16/2024 1218   Admission Weight 61.2 kg (134 lb 14.7 oz) Documented at 10/16/2024 1218             TELEMETRY: Sinus rhythm    Physical Exam:  Constitutional:       Appearance: Well-developed.   Eyes:      General: No scleral icterus.     Conjunctiva/sclera: Conjunctivae normal.      Pupils: Pupils are equal, round, and reactive to light.   HENT:      Head: Normocephalic and atraumatic.   Neck:      Vascular: No carotid bruit or JVD.   Pulmonary:      Effort: Pulmonary effort is normal.      Breath sounds: Normal breath sounds. No wheezing. No rales.   Cardiovascular:      Normal rate. Regular rhythm.   Pulses:     Intact distal pulses.   Abdominal:      General: Bowel sounds are normal.      Palpations: Abdomen is soft.   Musculoskeletal: Normal range of motion.      Cervical back: Normal range of motion and neck supple. Skin:     General: Skin is warm and dry.      Findings: No rash.   Neurological:      " Mental Status: Alert.      Comments: No focal deficits          Results Review:   I reviewed the patient's new clinical results.  Lab Results (last 24 hours)       Procedure Component Value Units Date/Time    High Sensitivity Troponin T 2Hr [[id number]]  (Normal) Collected: 10/16/24 1102    Specimen: Blood Updated: 10/16/24 1130     HS Troponin T 6 ng/L      Troponin T Delta -3 ng/L     Narrative:      High Sensitive Troponin T Reference Range:  <14.0 ng/L- Negative Female for AMI  <22.0 ng/L- Negative Male for AMI  >=14 - Abnormal Female indicating possible myocardial injury.  >=22 - Abnormal Male indicating possible myocardial injury.   Clinicians would have to utilize clinical acumen, EKG, Troponin, and serial changes to determine if it is an Acute Myocardial Infarction or myocardial injury due to an underlying chronic condition.         Comprehensive Metabolic Panel [[id number]]  (Abnormal) Collected: 10/16/24 0748    Specimen: Blood Updated: 10/16/24 0904     Glucose 114 mg/dL      BUN 4 mg/dL      Creatinine 0.89 mg/dL      Sodium 140 mmol/L      Potassium 3.7 mmol/L      Chloride 103 mmol/L      CO2 24.8 mmol/L      Calcium 8.6 mg/dL      Total Protein 8.5 g/dL      Albumin 3.3 g/dL      ALT (SGPT) 32 U/L      AST (SGOT) 128 U/L      Alkaline Phosphatase 124 U/L      Total Bilirubin 2.2 mg/dL      Globulin 5.2 gm/dL      A/G Ratio 0.6 g/dL      BUN/Creatinine Ratio 4.5     Anion Gap 12.2 mmol/L      eGFR 69.4 mL/min/1.73     Narrative:      GFR Normal >60  Chronic Kidney Disease <60  Kidney Failure <15    The GFR formula is only valid for adults with stable renal function between ages 18 and 70.    High Sensitivity Troponin T [[id number]]  (Normal) Collected: 10/16/24 0748    Specimen: Blood Updated: 10/16/24 0816     HS Troponin T 9 ng/L     Narrative:      High Sensitive Troponin T Reference Range:  <14.0 ng/L- Negative Female for AMI  <22.0 ng/L- Negative Male for AMI  >=14 - Abnormal Female indicating  possible myocardial injury.  >=22 - Abnormal Male indicating possible myocardial injury.   Clinicians would have to utilize clinical acumen, EKG, Troponin, and serial changes to determine if it is an Acute Myocardial Infarction or myocardial injury due to an underlying chronic condition.         CBC & Differential [[id number]]  (Abnormal) Collected: 10/16/24 0748    Specimen: Blood Updated: 10/16/24 0801    Narrative:      The following orders were created for panel order CBC & Differential.  Procedure                               Abnormality         Status                     ---------                               -----------         ------                     CBC Auto Differential[[id number]]        Abnormal            Final result               Scan Slide[[id number]]                   Normal              Final result                 Please view results for these tests on the individual orders.    Scan Slide [[id number]]  (Normal) Collected: 10/16/24 0748    Specimen: Blood Updated: 10/16/24 0801     RBC Morphology Normal     WBC Morphology Normal     Platelet Morphology Normal    Narrative:      Slide Reviewed     CBC Auto Differential [[id number]]  (Abnormal) Collected: 10/16/24 0748    Specimen: Blood Updated: 10/16/24 0801     WBC 7.43 10*3/mm3      RBC 3.91 10*6/mm3      Hemoglobin 12.6 g/dL      Hematocrit 36.8 %      MCV 94.1 fL      MCH 32.2 pg      MCHC 34.2 g/dL      RDW 15.8 %      RDW-SD 54.6 fl      MPV 12.7 fL      Platelets 134 10*3/mm3      Neutrophil % 60.9 %      Lymphocyte % 24.8 %      Monocyte % 11.4 %      Eosinophil % 1.5 %      Basophil % 1.3 %      Immature Grans % 0.1 %      Neutrophils, Absolute 4.52 10*3/mm3      Lymphocytes, Absolute 1.84 10*3/mm3      Monocytes, Absolute 0.85 10*3/mm3      Eosinophils, Absolute 0.11 10*3/mm3      Basophils, Absolute 0.10 10*3/mm3      Immature Grans, Absolute 0.01 10*3/mm3      nRBC 0.0 /100 WBC             Imaging Results (Last 24 Hours)       Procedure  Component Value Units Date/Time    XR Shoulder 2+ View Left [[id number]] Collected: 10/16/24 0832     Updated: 10/16/24 0840    Narrative:      XR SHOULDER 2+ VW LEFT    Date of Exam: 10/16/2024 8:24 AM EDT    Indication: Fall    Comparison: None available.    Findings:  Unremarkable radiographic appearance of the shoulder soft tissues. No acute fracture or traumatic malalignment. There are mild degenerative changes of the glenohumeral and acromioclavicular joints. No high-grade narrowing of the subacromial space. No   acute findings in the partially imaged left chest.      Impression:      Impression:  No acute fracture or traumatic malalignment.      Electronically Signed: Jaswant Jeff MD    10/16/2024 8:37 AM EDT    Workstation ID: [id number]    XR Chest 1 View [[id number]] Collected: 10/16/24 0832     Updated: 10/16/24 0835    Narrative:      XR CHEST 1 VW    Date of Exam: 10/16/2024 8:00 AM EDT    Indication: Chest pain    Comparison: 8/21/2023.    Findings:  Heart and pulmonary vessels appear within normal limits. Lung fields are clear of acute infiltrates or effusions. There is no pneumothorax.      Impression:      Impression:  No acute process.      Electronically Signed: Emy Smiley MD    10/16/2024 8:33 AM EDT    Workstation ID: [id number]    XR Spine Cervical Complete 4 or 5 View [[id number]] Collected: 10/16/24 0830     Updated: 10/16/24 0834    Narrative:      XR SPINE CERVICAL COMPLETE 4 OR 5 VW    Date of Exam: 10/16/2024 8:00 AM EDT    Indication: Fall    Comparison: 8/22/2023  Findings:  Cervical spine with oblique views for total of 6 films. There is carotid calcification, greatest on the right. There are normal vertebral body heights. There is mild narrowing of the C5-6 and C6-7 disc interspaces with mild spurring. There is normal   alignment. There is moderately severe right neural foraminal stenosis at C3-4 and C5-6. There is mild right neural foraminal stenosis at C4-5. There is mild left  neural foraminal stenosis at C5-6. No fractures are identified.      Impression:      Impression:  Chronic findings as detailed. No acute process.      Electronically Signed: Emy Smiley MD    10/16/2024 8:32 AM EDT    Workstation ID: [id number]            EKG      I personally viewed and interpreted the patient's EKG/Telemetry data:    ECHOCARDIOGRAM:         STRESS MYOVIEW:  Results for orders placed during the hospital encounter of 10/16/24    Stress Test With Myocardial Perfusion One Day    Interpretation Summary    Myocardial perfusion imaging indicates a normal myocardial perfusion study with no evidence of ischemia. Impressions are consistent with a low risk study.    Left ventricular ejection fraction is hyperdynamic (Calculated EF > 70%).       CARDIAC CATHETERIZATION:    No results found for this or any previous visit.       OTHER:         MDM      Chest pain  Patient presented with chest pain which is atypical for angina and is ruled out for MI by EKG and enzymes  Patient underwent a stress Myoview study which did not show any ischemia  Patient will continue on medical therapy.    Hypertension  Patient blood pressure currently stable on home medicines    Hyperlipidemia  Patient is on Repatha and the lipid levels are followed by the primary care doctor.    I discussed the patients findings and my recommendations with patient and nurse    Himanshu Coates MD  10/16/24  16:23 EDT

## 2024-10-16 NOTE — ED NOTES
Nursing report ED to floor  Priscilla Fragoso  69 y.o.  female    HPI:   Chief Complaint   Patient presents with    Neck Pain       Admitting doctor:   Brennan Lester MD    Admitting diagnosis:   The primary encounter diagnosis was Left arm pain. A diagnosis of Anginal equivalent was also pertinent to this visit.    Code status:   Current Code Status       Date Active Code Status Order ID Comments User Context       Not on file            Allergies:   Codeine, Penicillins, Sulfa antibiotics, and Tetanus toxoids    Isolation:  No active isolations     Fall Risk:  Fall Risk Assessment was completed, and patient is at low risk for falls.   Predictive Model Details         57 (High) Factor Value    Calculated 10/16/2024 10:55 Age 69    Risk of Fall Model Musculoskeletal Assessment X     Active Peripheral IV Present     Imaging order in this encounter Present     Respiratory Rate 20     Skin Assessment X     Magnesium not on file     Albumin 3.3 g/dL     Total Bilirubin 2.2 mg/dL     Calcium 8.6 mg/dL     Kaushal Scale not on file     Number of Distinct Medication Classes administered 2     Diastolic BP 83     Cardiac Assessment X     ALT 32 U/L     Gastrointestinal Assessment X     Creatinine 0.89 mg/dL     Chloride 103 mmol/L     Days after Admission 0.136     Tobacco Use Quit     Potassium 3.7 mmol/L         Weight:   There were no vitals filed for this visit.    Intake and Output  No intake or output data in the 24 hours ending 10/16/24 1058    Diet:        Most recent vitals:   Vitals:    10/16/24 0815 10/16/24 0831 10/16/24 1030 10/16/24 1040   BP:  174/83 (!) 203/79 (!) 218/89   BP Location:       Pulse: 80 79 88 83   Resp:       Temp:       TempSrc:       SpO2: 98% 98%  99%       Active LDAs/IV Access:   Lines, Drains & Airways       Active LDAs       Name Placement date Placement time Site Days    Peripheral IV 10/16/24 0744 Left Antecubital 10/16/24  0744  Antecubital  less than 1                    Skin Condition:    Skin Assessments (last day)       Date/Time Skin WDL Skin Temperature Skin Elasticity Skin Integrity    10/16/24 07:44:23 X;all warm slow return to original state bruised (ecchymotic)             Labs (abnormal labs have a star):   Labs Reviewed   COMPREHENSIVE METABOLIC PANEL - Abnormal; Notable for the following components:       Result Value    Glucose 114 (*)     BUN 4 (*)     Albumin 3.3 (*)     AST (SGOT) 128 (*)     Alkaline Phosphatase 124 (*)     Total Bilirubin 2.2 (*)     BUN/Creatinine Ratio 4.5 (*)     All other components within normal limits    Narrative:     GFR Normal >60  Chronic Kidney Disease <60  Kidney Failure <15    The GFR formula is only valid for adults with stable renal function between ages 18 and 70.   CBC WITH AUTO DIFFERENTIAL - Abnormal; Notable for the following components:    RDW 15.8 (*)     RDW-SD 54.6 (*)     MPV 12.7 (*)     Platelets 134 (*)     All other components within normal limits   TROPONIN - Normal    Narrative:     High Sensitive Troponin T Reference Range:  <14.0 ng/L- Negative Female for AMI  <22.0 ng/L- Negative Male for AMI  >=14 - Abnormal Female indicating possible myocardial injury.  >=22 - Abnormal Male indicating possible myocardial injury.   Clinicians would have to utilize clinical acumen, EKG, Troponin, and serial changes to determine if it is an Acute Myocardial Infarction or myocardial injury due to an underlying chronic condition.        SCAN SLIDE - Normal    Narrative:     Slide Reviewed    HIGH SENSITIVITIY TROPONIN T 2HR   CBC AND DIFFERENTIAL    Narrative:     The following orders were created for panel order CBC & Differential.  Procedure                               Abnormality         Status                     ---------                               -----------         ------                     CBC Auto Differential[[id number]]        Abnormal            Final result               Scan Slide[[id number]]                   Normal               Final result                 Please view results for these tests on the individual orders.       LOC: Person, Place, Time, and Situation    Telemetry:  Observation Unit    Cardiac Monitoring Ordered: yes    EKG:   ECG 12 Lead Chest Pain   Preliminary Result   HEART RATE=76  bpm   RR Xhiyfkul=487  ms   ME Apqmiqak=824  ms   P Horizontal Axis=-21  deg   P Front Axis=1  deg   QRSD Interval=80  ms   QT Zmmilmkz=836  ms   EXgD=576  ms   QRS Axis=35  deg   T Wave Axis=31  deg   - NORMAL ECG -   Sinus rhythm   Date and Time of Study:2024-10-16 07:47:40          Medications Given in the ED:   Medications   sodium chloride 0.9 % flush 10 mL (has no administration in time range)   labetalol (NORMODYNE,TRANDATE) injection 10 mg (10 mg Intravenous Given 10/16/24 0809)   technetium tetrofosmin (Tc-MYOVIEW) injection 1 dose (1 dose Intravenous Given 10/16/24 1054)       Imaging results:  XR Shoulder 2+ View Left    Result Date: 10/16/2024  Impression: No acute fracture or traumatic malalignment. Electronically Signed: Jaswant Jeff MD  10/16/2024 8:37 AM EDT  Workstation ID: [id number]    XR Chest 1 View    Result Date: 10/16/2024  Impression: No acute process. Electronically Signed: Emy Smiley MD  10/16/2024 8:33 AM EDT  Workstation ID: [id number]    XR Spine Cervical Complete 4 or 5 View    Result Date: 10/16/2024  Impression: Chronic findings as detailed. No acute process. Electronically Signed: Emy Smiley MD  10/16/2024 8:32 AM EDT  Workstation ID: [id number]     Social issues:   Social History     Socioeconomic History    Marital status:    Tobacco Use    Smoking status: Former     Current packs/day: 0.00     Average packs/day: 3.0 packs/day for 15.1 years (45.4 ttl pk-yrs)     Types: Cigarettes     Start date:      Quit date: 1994     Years since quittin.6     Passive exposure: Past    Smokeless tobacco: Never   Vaping Use    Vaping status: Never Used   Substance and Sexual Activity    Alcohol  use: Not Currently    Drug use: Never    Sexual activity: Defer       NIH Stroke Scale:  Interval: (not recorded)  1a. Level of Consciousness: (not recorded)  1b. LOC Questions: (not recorded)  1c. LOC Commands: (not recorded)  2. Best Gaze: (not recorded)  3. Visual: (not recorded)  4. Facial Palsy: (not recorded)  5a. Motor Arm, Left: (not recorded)  5b. Motor Arm, Right: (not recorded)  6a. Motor Leg, Left: (not recorded)  6b. Motor Leg, Right: (not recorded)  7. Limb Ataxia: (not recorded)  8. Sensory: (not recorded)  9. Best Language: (not recorded)  10. Dysarthria: (not recorded)  11. Extinction and Inattention (formerly Neglect): (not recorded)    Total (NIH Stroke Scale): (not recorded)     Additional notable assessment information:Ka     Nursing report ED to floor:  Monae Shields RN   10/16/24 10:58 EDT

## 2024-10-16 NOTE — PLAN OF CARE
Problem: Adult Inpatient Plan of Care  Goal: Plan of Care Review  Outcome: Progressing  Goal: Patient-Specific Goal (Individualized)  Outcome: Progressing  Goal: Absence of Hospital-Acquired Illness or Injury  Outcome: Progressing  Intervention: Identify and Manage Fall Risk  Recent Flowsheet Documentation  Taken 10/16/2024 1800 by Monae Elias RN  Safety Promotion/Fall Prevention:   safety round/check completed   assistive device/personal items within reach   clutter free environment maintained   nonskid shoes/slippers when out of bed   room organization consistent  Taken 10/16/2024 1600 by Monae Elias RN  Safety Promotion/Fall Prevention:   safety round/check completed   assistive device/personal items within reach   clutter free environment maintained   nonskid shoes/slippers when out of bed   room organization consistent  Taken 10/16/2024 1501 by Monae Elias RN  Safety Promotion/Fall Prevention:   safety round/check completed   assistive device/personal items within reach   clutter free environment maintained   nonskid shoes/slippers when out of bed   room organization consistent  Intervention: Prevent Skin Injury  Recent Flowsheet Documentation  Taken 10/16/2024 1501 by Monae Elias RN  Body Position: position changed independently  Skin Protection: transparent dressing maintained  Intervention: Prevent and Manage VTE (Venous Thromboembolism) Risk  Recent Flowsheet Documentation  Taken 10/16/2024 1501 by Monae Elias RN  VTE Prevention/Management:   SCDs (sequential compression devices) off   patient refused intervention  Intervention: Prevent Infection  Recent Flowsheet Documentation  Taken 10/16/2024 1501 by Monae Elias RN  Infection Prevention:   hand hygiene promoted   rest/sleep promoted   single patient room provided  Goal: Optimal Comfort and Wellbeing  Outcome: Progressing  Intervention: Provide Person-Centered Care  Recent Flowsheet Documentation  Taken 10/16/2024 1501 by Curt  Monae, RN  Trust Relationship/Rapport:   care explained   questions answered  Goal: Readiness for Transition of Care  Outcome: Progressing  Intervention: Mutually Develop Transition Plan  Recent Flowsheet Documentation  Taken 10/16/2024 1446 by Monae Elias, RN  Transportation Anticipated: family or friend will provide  Patient/Family Anticipated Services at Transition: none  Patient/Family Anticipates Transition to: home  Taken 10/16/2024 1444 by Monae Elias, RN  Equipment Currently Used at Home: cane, quad tip   Goal Outcome Evaluation:   Stress test normal. Plan for colonoscopy tomorrow 10/17 at 7am, likely d/c after procedure

## 2024-10-16 NOTE — H&P
[facility] Observation Unit H&P    Patient Name: Priscilla Fragoso  : 1955  MRN: [id number]  Primary Care Physician: Zoe Mohr MD  Date of admission: 10/16/2024     Patient Care Team:  Zoe Mohr MD as PCP - General (Family Medicine)  Monae Elias, RN as Registered Nurse          Subjective   History Present Illness     Chief Complaint:   Chief Complaint   Patient presents with    Neck Pain     Left chest, arm pain    Neck Pain   Associated symptoms include chest pain.       ED  69-year-old female who was 1 to get a colonoscopy when she was in the preop area she suddenly had some pain that shot in her neck down her left arm. It is gone now. They did an EKG and they sent her to the ER. No chest pain or shortness of breath or sweating. She states she fell a couple weeks ago and she has been having some intermittent neck and shoulder pain since that time. Denies any fever chills sweats cough congestion vomiting diarrhea leg pain or swelling long car ride plane or immobilization. Denies any other numbness ting or weakness or head injury. Comes and goes sharp in nature intermittent short-lived.     Observation 10/16/24  Stress test pending. Colorectal consulted    Review of Systems   Cardiovascular:  Positive for chest pain.   Musculoskeletal:  Positive for neck pain.        Left arm pain             Personal History     Past Medical History:   Past Medical History:   Diagnosis Date    Anesthesia complication     stated was given too much    Asthma     GERD (gastroesophageal reflux disease)     GI bleed     Headache     Hyperlipidemia     Hypertension     Peptic ulceration     PONV (postoperative nausea and vomiting)        Surgical History:      Past Surgical History:   Procedure Laterality Date    EYE SURGERY      TUBAL ABDOMINAL LIGATION             Family History: family history is not on file. Otherwise pertinent FHx was reviewed and unremarkable.     Social History:  reports that she quit  smoking about 30 years ago. Her smoking use included cigarettes. She started smoking about 45 years ago. She has a 45.4 pack-year smoking history. She has been exposed to tobacco smoke. She has never used smokeless tobacco. She reports that she does not currently use alcohol. She reports that she does not use drugs.      Medications:  Prior to Admission medications    Medication Sig Start Date End Date Taking? Authorizing Provider   albuterol (ACCUNEB) 0.63 MG/3ML nebulizer solution Take 3 mL by nebulization Every Night. 1/26/23  Yes Jacinta Andrade APRN   albuterol sulfate  (90 Base) MCG/ACT inhaler Inhale 2 puffs Every 4 (Four) Hours As Needed for Wheezing. 1/26/23  Yes Jacinta Andrade APRN   aspirin 325 MG tablet Take 1 tablet by mouth Daily.   Yes ProviderEdu MD   atenolol-chlorthalidone (TENORETIC) 50-25 MG per tablet TAKE 1 TABLET BY MOUTH DAILY 5/6/24  Yes Alysia Moe MD   B-COMPLEX-C PO Take  by mouth.   Yes Edu Brunner MD   cloNIDine (CATAPRES) 0.2 MG tablet TAKE 1 TABLET BY MOUTH TWICE DAILY 8/1/24  Yes Alysia Meo MD   cyclobenzaprine (FLEXERIL) 5 MG tablet Take 1 tablet by mouth 3 (Three) Times a Day As Needed for Muscle Spasms. 2/5/24  Yes Alysia Moe MD   D3 Maximum Strength 125 MCG (5000 UT) capsule capsule TAKE 1 CAPSULE BY MOUTH DAILY 8/1/24  Yes Alysia Moe MD   esomeprazole (nexIUM) 20 MG capsule Take 1 capsule by mouth Every Morning Before Breakfast. 2/5/24  Yes Alysia Moe MD   hydrOXYzine (ATARAX) 25 MG tablet TAKE 1 TABLET BY MOUTH THREE TIMES DAILY AS NEEDED FOR ITCHING 9/3/24  Yes Alysia Moe MD   montelukast (SINGULAIR) 10 MG tablet TAKE 1 TABLET BY MOUTH EVERY NIGHT 6/21/24  Yes Alysia Moe MD   Repatha solution prefilled syringe injection INJECT 1 MILLILITER UNDER THE SKIN AS DIRECTED EVERY 14 DAYS 8/12/24  Yes Alysia Moe MD   cloNIDine (CATAPRES) 0.2 MG tablet  Take 1 tablet by mouth 2 (Two) Times a Day.    Provider, MD Edu   famotidine (PEPCID) 20 MG tablet TAKE 1 TABLET BY MOUTH AT NIGHT AS NEEDED FOR HEARTBURN 5/6/24   Alysia Moe MD   ciprofloxacin (Cipro) 500 MG tablet Take 1 tablet by mouth 2 (Two) Times a Day. 8/13/24 10/16/24  Zoe Mohr MD   cloNIDine (CATAPRES) 0.1 MG tablet TAKE 1 TABLET BY MOUTH TWICE DAILY  Patient not taking: Reported on 8/13/2024 5/6/24 10/16/24  Alysia Moe MD   gabapentin (NEURONTIN) 100 MG capsule Take 1 capsule by mouth 3 (Three) Times a Day. 5/8/24 10/16/24  Alysia Moe MD   metroNIDAZOLE (Flagyl) 500 MG tablet Take 1 tablet by mouth 3 (Three) Times a Day. 8/13/24 10/16/24  Zoe Mohr MD       Allergies:    Allergies   Allergen Reactions    Codeine Anaphylaxis    Penicillins Swelling    Sulfa Antibiotics Rash    Tetanus Toxoids Rash       Objective   Objective     Vital Signs  Temp:  [98.1 °F (36.7 °C)-99.1 °F (37.3 °C)] 98.1 °F (36.7 °C)  Heart Rate:  [75-88] 82  Resp:  [17-22] 17  BP: (171-218)/() 192/83  SpO2:  [97 %-100 %] 99 %  on   ;   Device (Oxygen Therapy): room air  Body mass index is 26.35 kg/m².    Physical Exam        Results Review:  I have personally reviewed most recent cardiac tracings, lab results, and radiology images and interpretations and agree with findings, most notably: cbc, cmp, troponin, chest xray, ekg.    Results from last 7 days   Lab Units 10/16/24  0748   WBC 10*3/mm3 7.43   HEMOGLOBIN g/dL 12.6   HEMATOCRIT % 36.8   PLATELETS 10*3/mm3 134*     Results from last 7 days   Lab Units 10/16/24  1102 10/16/24  0748   SODIUM mmol/L  --  140   POTASSIUM mmol/L  --  3.7   CHLORIDE mmol/L  --  103   CO2 mmol/L  --  24.8   BUN mg/dL  --  4*   CREATININE mg/dL  --  0.89   GLUCOSE mg/dL  --  114*   CALCIUM mg/dL  --  8.6   ALK PHOS U/L  --  124*   ALT (SGPT) U/L  --  32   AST (SGOT) U/L  --  128*   HSTROP T ng/L 6 9     Estimated Creatinine  Clearance: 47.4 mL/min (by C-G formula based on SCr of 0.89 mg/dL).  Brief Urine Lab Results  (Last result in the past 365 days)        Color   Clarity   Blood   Leuk Est   Nitrite   Protein   CREAT   Urine HCG        08/13/24 1554 Sanna   Clear   Negative   Trace   Negative   Negative                   Microbiology Results (last 10 days)       ** No results found for the last 240 hours. **            ECG/EMG Results (most recent)       Procedure Component Value Units Date/Time    ECG 12 Lead Chest Pain [[id number]] Collected: 10/16/24 0747     Updated: 10/16/24 0748     QT Interval 421 ms      QTC Interval 473 ms     Narrative:      HEART RATE=76  bpm  RR Gjkgmyfx=086  ms  NE Wedevfig=025  ms  P Horizontal Axis=-21  deg  P Front Axis=1  deg  QRSD Interval=80  ms  QT Qcdabdkx=127  ms  RXjO=178  ms  QRS Axis=35  deg  T Wave Axis=31  deg  - NORMAL ECG -  Sinus rhythm  Date and Time of Study:2024-10-16 07:47:40            Results for orders placed during the hospital encounter of 10/09/23    Duplex Carotid Ultrasound CAR    Interpretation Summary    Right internal carotid artery demonstrates a 50-69% stenosis.    Left internal carotid artery demonstrates a less than 50% stenosis.          XR Shoulder 2+ View Left    Result Date: 10/16/2024  Impression: No acute fracture or traumatic malalignment. Electronically Signed: Jaswant Jeff MD  10/16/2024 8:37 AM EDT  Workstation ID: [id number]    XR Chest 1 View    Result Date: 10/16/2024  Impression: No acute process. Electronically Signed: Emy Smiley MD  10/16/2024 8:33 AM EDT  Workstation ID: [id number]    XR Spine Cervical Complete 4 or 5 View    Result Date: 10/16/2024  Impression: Chronic findings as detailed. No acute process. Electronically Signed: Emy Smiley MD  10/16/2024 8:32 AM EDT  Workstation ID: [id number]       Estimated Creatinine Clearance: 47.4 mL/min (by C-G formula based on SCr of 0.89 mg/dL).    Assessment & Plan   Assessment/Plan       Active  Hospital Problems    Diagnosis  POA    **Left arm pain [M79.602]  Yes      Resolved Hospital Problems   No resolved problems to display.     Chest pain  Lab Results   Component Value Date    TROPONINT 6 10/16/2024    TROPONINT 9 10/16/2024     -cbc, cmp, unremarkable  -Chest X-ray:reviewed and showing no acute process  -EKG:rate 76 sinus  -Stress Test performed and is low risk for ischemia  -Telemetry      Colonoscopy  - pt was here to have coloscopy when pain developed  - colorectal consulted  - colonoscopy in am        VTE Prophylaxis - Active VTE Prophylaxis  Mechanical:        Start        10/16/24 1343  Maintain Sequential Compression Device  Continuous                          Select Pharmacologic VTE Prophylaxis if Desired & Appropriate      CODE STATUS:    Code Status and Medical Interventions: CPR (Attempt to Resuscitate); Full Support   Ordered at: 10/16/24 1342     Code Status (Patient has no pulse and is not breathing):    CPR (Attempt to Resuscitate)     Medical Interventions (Patient has pulse or is breathing):    Full Support       This patient has been examined wearing personal protective equipment.     I discussed the patient's findings and my recommendations with patient and nursing staff.      Signature:Electronically signed by iLsa Martínez PA-C, 10/16/24, 4:47 PM EDT.

## 2024-10-16 NOTE — CASE MANAGEMENT/SOCIAL WORK
Discharge Planning Assessment   [facility]     Patient Name: Priscilla Fragoso  MRN: [id number]  Today's Date: 10/16/2024    Admit Date: 10/16/2024    Plan: Needs full case management assessment   Discharge Needs Assessment    No documentation.                  Discharge Plan       Row Name 10/16/24 1021       Plan    Plan Needs full case management assessment    Plan Comments Attempted to see patient, but she took a phone call and was not available to discuss dc planning. Will need full case management assessment.                Saida Koch RN, [facility]  Office: [phone]  Fax: [phone]  [email or web address]      I met with patient in room wearing PPE: mask    Maintained distance greater than six feet and spent </=15 minutes in the room      Saida Koch RN

## 2024-10-16 NOTE — ED PROVIDER NOTES
Subjective   History of Present Illness  Chief complaint neck and arm pain    History of present illness is a 69-year-old female who was 1 to get a colonoscopy when she was in the preop area she suddenly had some pain that shot in her neck down her left arm.  It is gone now.  They did an EKG and they sent her to the ER.  No chest pain or shortness of breath or sweating.  She states she fell a couple weeks ago and she has been having some intermittent neck and shoulder pain since that time.  Denies any fever chills sweats cough congestion vomiting diarrhea leg pain or swelling long car ride plane or immobilization.  Denies any other numbness ting or weakness or head injury.  Comes and goes sharp in nature intermittent short-lived.      Review of Systems   Constitutional:  Negative for chills and fever.   Respiratory:  Negative for chest tightness and shortness of breath.    Cardiovascular:  Positive for palpitations. Negative for chest pain.   Gastrointestinal:  Negative for abdominal pain and vomiting.   Musculoskeletal:  Positive for neck pain. Negative for back pain.   Skin:  Negative for rash.   Neurological:  Negative for weakness and numbness.   Psychiatric/Behavioral:  Negative for confusion.        Past Medical History:   Diagnosis Date    Anesthesia complication     stated was given too much    Asthma     GERD (gastroesophageal reflux disease)     GI bleed     Headache     Hyperlipidemia     Hypertension     Peptic ulceration     PONV (postoperative nausea and vomiting)        Allergies   Allergen Reactions    Codeine Anaphylaxis    Penicillins Swelling    Sulfa Antibiotics Rash    Tetanus Toxoids Rash       Past Surgical History:   Procedure Laterality Date    EYE SURGERY      TUBAL ABDOMINAL LIGATION         No family history on file.    Social History     Socioeconomic History    Marital status:    Tobacco Use    Smoking status: Former     Current packs/day: 0.00     Average packs/day: 3.0  packs/day for 15.1 years (45.4 ttl pk-yrs)     Types: Cigarettes     Start date:      Quit date: 1994     Years since quittin.6     Passive exposure: Past    Smokeless tobacco: Never   Vaping Use    Vaping status: Never Used   Substance and Sexual Activity    Alcohol use: Not Currently    Drug use: Never    Sexual activity: Defer     Prior to Admission medications    Medication Sig Start Date End Date Taking? Authorizing Provider   albuterol (ACCUNEB) 0.63 MG/3ML nebulizer solution Take 3 mL by nebulization Every Night. 23   Jacinta Andrade APRN   albuterol sulfate  (90 Base) MCG/ACT inhaler Inhale 2 puffs Every 4 (Four) Hours As Needed for Wheezing. 23   Jacinta Andrade APRN   aspirin 325 MG tablet Take 1 tablet by mouth Daily.    Provider, MD Edu   atenolol-chlorthalidone (TENORETIC) 50-25 MG per tablet TAKE 1 TABLET BY MOUTH DAILY 24   Alysia Moe MD   B-COMPLEX-C PO Take  by mouth.    Provider, MD Edu   ciprofloxacin (Cipro) 500 MG tablet Take 1 tablet by mouth 2 (Two) Times a Day. 24   Zoe Mohr MD   cloNIDine (CATAPRES) 0.1 MG tablet TAKE 1 TABLET BY MOUTH TWICE DAILY  Patient not taking: Reported on 2024   Alysia Moe MD   cloNIDine (CATAPRES) 0.2 MG tablet TAKE 1 TABLET BY MOUTH TWICE DAILY 24   Alysia Moe MD   cyclobenzaprine (FLEXERIL) 5 MG tablet Take 1 tablet by mouth 3 (Three) Times a Day As Needed for Muscle Spasms. 24   Alysia Moe MD   D3 Maximum Strength 125 MCG (5000 UT) capsule capsule TAKE 1 CAPSULE BY MOUTH DAILY 24   Alysia Moe MD   esomeprazole (nexIUM) 20 MG capsule Take 1 capsule by mouth Every Morning Before Breakfast. 24   Alysia Moe MD   famotidine (PEPCID) 20 MG tablet TAKE 1 TABLET BY MOUTH AT NIGHT AS NEEDED FOR HEARTBURN 24   Alysia Moe MD   gabapentin (NEURONTIN) 100 MG capsule Take 1 capsule by  mouth 3 (Three) Times a Day. 5/8/24   Alysia Moe MD   hydrOXYzine (ATARAX) 25 MG tablet TAKE 1 TABLET BY MOUTH THREE TIMES DAILY AS NEEDED FOR ITCHING 9/3/24   Alysia Moe MD   metroNIDAZOLE (Flagyl) 500 MG tablet Take 1 tablet by mouth 3 (Three) Times a Day. 8/13/24   Zoe Mohr MD   montelukast (SINGULAIR) 10 MG tablet TAKE 1 TABLET BY MOUTH EVERY NIGHT 6/21/24   Alysia Moe MD   Repatha solution prefilled syringe injection INJECT 1 MILLILITER UNDER THE SKIN AS DIRECTED EVERY 14 DAYS 8/12/24   Alysia Moe MD          Objective   Physical Exam  Constitutional is a 69-year-old female awake alert no acute distress triage vital signs reviewed.  HEENT extraocular muscles are intact pupils equal round reactive mouth clear neck supple no adenopathy no JV no bruits no direct cervical thoracic lumbar spine tenderness but there is some paracervical tenderness to the left but no step-off or deformity trachea midline no JVD no bruits good and equal carotid pulses.  Lungs clear no retraction no use of accessory heart regular without murmur abdomen soft nontender good bowel sounds no pulsatile masses extremities pulses are equal throughout upper and lower extremities no edema no cords no Homans' sign no evidence of DVT left arm full range of motion no swelling patient has hand is warm dry weakness is not present motor strength all normal full range of motion of shoulder and arm distally neurovascular motor sensor including deltoid tricep forearm hand area.  Legs pulses equal in lower extremities no edema cords or Homans' sign no evidence of DVT neurologic awake alert and follows commands no face asymmetry speech normal without focal weakness  Procedures           ED Course      Results for orders placed or performed during the hospital encounter of 10/16/24   ECG 12 Lead Chest Pain    Collection Time: 10/16/24  7:47 AM   Result Value Ref Range    QT Interval 421 ms    QTC  Interval 473 ms   Comprehensive Metabolic Panel    Collection Time: 10/16/24  7:48 AM    Specimen: Blood   Result Value Ref Range    Glucose 114 (H) 65 - 99 mg/dL    BUN 4 (L) 8 - 23 mg/dL    Creatinine 0.89 0.57 - 1.00 mg/dL    Sodium 140 136 - 145 mmol/L    Potassium 3.7 3.5 - 5.2 mmol/L    Chloride 103 98 - 107 mmol/L    CO2 24.8 22.0 - 29.0 mmol/L    Calcium 8.6 8.6 - 10.5 mg/dL    Total Protein 8.5 6.0 - 8.5 g/dL    Albumin 3.3 (L) 3.5 - 5.2 g/dL    ALT (SGPT) 32 1 - 33 U/L    AST (SGOT) 128 (H) 1 - 32 U/L    Alkaline Phosphatase 124 (H) 39 - 117 U/L    Total Bilirubin 2.2 (H) 0.0 - 1.2 mg/dL    Globulin 5.2 gm/dL    A/G Ratio 0.6 g/dL    BUN/Creatinine Ratio 4.5 (L) 7.0 - 25.0    Anion Gap 12.2 5.0 - 15.0 mmol/L    eGFR 69.4 >60.0 mL/min/1.73   High Sensitivity Troponin T    Collection Time: 10/16/24  7:48 AM    Specimen: Blood   Result Value Ref Range    HS Troponin T 9 <14 ng/L   CBC Auto Differential    Collection Time: 10/16/24  7:48 AM    Specimen: Blood   Result Value Ref Range    WBC 7.43 3.40 - 10.80 10*3/mm3    RBC 3.91 3.77 - 5.28 10*6/mm3    Hemoglobin 12.6 12.0 - 15.9 g/dL    Hematocrit 36.8 34.0 - 46.6 %    MCV 94.1 79.0 - 97.0 fL    MCH 32.2 26.6 - 33.0 pg    MCHC 34.2 31.5 - 35.7 g/dL    RDW 15.8 (H) 12.3 - 15.4 %    RDW-SD 54.6 (H) 37.0 - 54.0 fl    MPV 12.7 (H) 6.0 - 12.0 fL    Platelets 134 (L) 140 - 450 10*3/mm3    Neutrophil % 60.9 42.7 - 76.0 %    Lymphocyte % 24.8 19.6 - 45.3 %    Monocyte % 11.4 5.0 - 12.0 %    Eosinophil % 1.5 0.3 - 6.2 %    Basophil % 1.3 0.0 - 1.5 %    Immature Grans % 0.1 0.0 - 0.5 %    Neutrophils, Absolute 4.52 1.70 - 7.00 10*3/mm3    Lymphocytes, Absolute 1.84 0.70 - 3.10 10*3/mm3    Monocytes, Absolute 0.85 0.10 - 0.90 10*3/mm3    Eosinophils, Absolute 0.11 0.00 - 0.40 10*3/mm3    Basophils, Absolute 0.10 0.00 - 0.20 10*3/mm3    Immature Grans, Absolute 0.01 0.00 - 0.05 10*3/mm3    nRBC 0.0 0.0 - 0.2 /100 WBC   Scan Slide    Collection Time: 10/16/24  7:48 AM     Specimen: Blood   Result Value Ref Range    RBC Morphology Normal Normal    WBC Morphology Normal Normal    Platelet Morphology Normal Normal     XR Shoulder 2+ View Left    Result Date: 10/16/2024  Impression: No acute fracture or traumatic malalignment. Electronically Signed: Jaswant Jeff MD  10/16/2024 8:37 AM EDT  Workstation ID: [id number]    XR Chest 1 View    Result Date: 10/16/2024  Impression: No acute process. Electronically Signed: Emy Smiley MD  10/16/2024 8:33 AM EDT  Workstation ID: [id number]    XR Spine Cervical Complete 4 or 5 View    Result Date: 10/16/2024  Impression: Chronic findings as detailed. No acute process. Electronically Signed: Emy Smiley MD  10/16/2024 8:32 AM EDT  Workstation ID: [id number]   Medications   sodium chloride 0.9 % flush 10 mL (has no administration in time range)   labetalol (NORMODYNE,TRANDATE) injection 10 mg (10 mg Intravenous Given 10/16/24 0809)                                    EKG my interpretation normal sinus rhythm rate of 76 normal axis no hypertrophy QTc of 473 nonspecific ST changes noted on the lateral leads borderline EKG really no significant change when compared to 10/16/2024          Medical Decision Making  Medical decision making IV established monitor placed my review of sinus rhythm blood pressure was elevated though.  EKG obtained my interpretation normal sinus rhythm rate 76 normal axis hypertrophy nonspecific ST changes noted lateral leads but no change compared to 10/16/2024 borderline EKG.  Patient blood pressure remained elevated given labetalol 10 mg IV.  Blood pressure improved to 174/83.  Labs obtained by independent review comprehensive metabolic profile unremarkable an AST of 128 alk phos of 124.  First troponin within normal limits CBC unremarkable.  All labs independent reviewed by me chest x-ray my independent review do not see evidence of pneumonia pneumothorax failure acute findings radiology review unremarkable.  The patient  had a x-ray of the shoulder and cervical spine my independent review no fracture dislocation subluxation degenerative changes noted.  Radiology review the same.  The patient repeat exam is resting comfortably at this time there is some reproducible pain that could suggest musculoskeletal from a fall couple weeks ago but she does have some risk factors for heart disease and some EKG changes noted as well.  She will need serial troponins.  She will need to be cleared from a cardiac standpoint and may be able to get her colonoscopy.  Therefore an aspirin was held just in case they do decide to go ahead with a colonoscopy since she did the prep.  I do not see any evidence of acute ST elevation myocardial infarction DVT pulmonary embolism or dissection pericarditis markers pericardial effusion DVT in the arm vascular compromise sepsis although not a complete list of all possibilities.  She be placed in observation provider notified case discussed patient agreeable stable unremarkable ER course.    Problems Addressed:  Anginal equivalent: complicated acute illness or injury  Left arm pain: complicated acute illness or injury    Amount and/or Complexity of Data Reviewed  Labs: ordered. Decision-making details documented in ED Course.  Radiology: ordered and independent interpretation performed. Decision-making details documented in ED Course.  ECG/medicine tests: ordered and independent interpretation performed. Decision-making details documented in ED Course.    Risk  Prescription drug management.  Decision regarding hospitalization.        Final diagnoses:   Left arm pain   Anginal equivalent       ED Disposition  ED Disposition       ED Disposition   Decision to Admit    Condition   --    Comment   --               No follow-up provider specified.       Medication List        ASK your doctor about these medications      * cloNIDine 0.2 MG tablet  Commonly known as: CATAPRES  TAKE 1 TABLET BY MOUTH TWICE DAILY  Ask  about: Which instructions should I use?     * cloNIDine 0.2 MG tablet  Commonly known as: CATAPRES  Ask about: Which instructions should I use?           * This list has 2 medication(s) that are the same as other medications prescribed for you. Read the directions carefully, and ask your doctor or other care provider to review them with you.                     Brennan Lester MD  10/16/24 3845

## 2024-10-16 NOTE — SIGNIFICANT NOTE
STAT EKG ordered for left shoulder, neck and arm pain with severe epigastric pain and nausea.  Patient states that its the same pain she experienced with her last MI.  [facility] to eval patient and determined that patient should go to ED for evaluation.  Patient transported via stretcher.

## 2024-10-16 NOTE — PLAN OF CARE
Goal Outcome Evaluation:  Plan of Care Reviewed With: patient        Progress: no change  Outcome Evaluation: Pt is a 69-year-old female who was present outpatient for a colonoscopy this morning when she suddenly had pain that shot in her neck down her left arm which has now resolved. They did an EKG and they sent her to the ER.  She states she fell a couple weeks ago and she has been having some intermittent neck and shoulder pain since that time. Pt in observation for cardiac workup. PLOF is (I) with ADLs, household ambulation with cane, and drives. She lives alone in a [address] home and has family support from sister. Today she was able to perform bed mobility and transfers with CGA and was evaluated for musculoskeletal pain in left neck/shoulder area and found to have left cervical radiculopathy and muscle spasms in UT/levator area. She was given HEP and demonstrated good understanding. Pt did not want to participate in OPPT at this time.    Anticipated Discharge Disposition (PT): home

## 2024-10-17 ENCOUNTER — READMISSION MANAGEMENT (OUTPATIENT)
Dept: CALL CENTER | Facility: HOSPITAL | Age: 69
End: 2024-10-17
Payer: MEDICARE

## 2024-10-17 ENCOUNTER — ANESTHESIA EVENT (OUTPATIENT)
Dept: GASTROENTEROLOGY | Facility: HOSPITAL | Age: 69
End: 2024-10-17
Payer: MEDICARE

## 2024-10-17 ENCOUNTER — ANESTHESIA (OUTPATIENT)
Dept: GASTROENTEROLOGY | Facility: HOSPITAL | Age: 69
End: 2024-10-17
Payer: MEDICARE

## 2024-10-17 VITALS
HEIGHT: 60 IN | DIASTOLIC BLOOD PRESSURE: 71 MMHG | OXYGEN SATURATION: 100 % | RESPIRATION RATE: 11 BRPM | WEIGHT: 134.92 LBS | SYSTOLIC BLOOD PRESSURE: 157 MMHG | BODY MASS INDEX: 26.49 KG/M2 | HEART RATE: 60 BPM | TEMPERATURE: 97.9 F

## 2024-10-17 LAB
ANION GAP SERPL CALCULATED.3IONS-SCNC: 9.3 MMOL/L (ref 5–15)
BASOPHILS # BLD AUTO: 0.07 10*3/MM3 (ref 0–0.2)
BASOPHILS NFR BLD AUTO: 1.2 % (ref 0–1.5)
BUN SERPL-MCNC: 4 MG/DL (ref 8–23)
BUN/CREAT SERPL: 5.5 (ref 7–25)
CALCIUM SPEC-SCNC: 8.2 MG/DL (ref 8.6–10.5)
CHLORIDE SERPL-SCNC: 102 MMOL/L (ref 98–107)
CO2 SERPL-SCNC: 26.7 MMOL/L (ref 22–29)
CREAT SERPL-MCNC: 0.73 MG/DL (ref 0.57–1)
DEPRECATED RDW RBC AUTO: 53.4 FL (ref 37–54)
EGFRCR SERPLBLD CKD-EPI 2021: 88 ML/MIN/1.73
EOSINOPHIL # BLD AUTO: 0.2 10*3/MM3 (ref 0–0.4)
EOSINOPHIL NFR BLD AUTO: 3.3 % (ref 0.3–6.2)
ERYTHROCYTE [DISTWIDTH] IN BLOOD BY AUTOMATED COUNT: 15.5 % (ref 12.3–15.4)
GLUCOSE SERPL-MCNC: 117 MG/DL (ref 65–99)
HCT VFR BLD AUTO: 31.9 % (ref 34–46.6)
HGB BLD-MCNC: 10.5 G/DL (ref 12–15.9)
IMM GRANULOCYTES # BLD AUTO: 0.01 10*3/MM3 (ref 0–0.05)
IMM GRANULOCYTES NFR BLD AUTO: 0.2 % (ref 0–0.5)
LYMPHOCYTES # BLD AUTO: 2.7 10*3/MM3 (ref 0.7–3.1)
LYMPHOCYTES NFR BLD AUTO: 44.6 % (ref 19.6–45.3)
MCH RBC QN AUTO: 31.1 PG (ref 26.6–33)
MCHC RBC AUTO-ENTMCNC: 32.9 G/DL (ref 31.5–35.7)
MCV RBC AUTO: 94.4 FL (ref 79–97)
MONOCYTES # BLD AUTO: 0.67 10*3/MM3 (ref 0.1–0.9)
MONOCYTES NFR BLD AUTO: 11.1 % (ref 5–12)
NEUTROPHILS NFR BLD AUTO: 2.41 10*3/MM3 (ref 1.7–7)
NEUTROPHILS NFR BLD AUTO: 39.6 % (ref 42.7–76)
NRBC BLD AUTO-RTO: 0 /100 WBC (ref 0–0.2)
PLATELET # BLD AUTO: 100 10*3/MM3 (ref 140–450)
PMV BLD AUTO: 12.5 FL (ref 6–12)
POTASSIUM SERPL-SCNC: 3.1 MMOL/L (ref 3.5–5.2)
QT INTERVAL: 421 MS
QTC INTERVAL: 473 MS
RBC # BLD AUTO: 3.38 10*6/MM3 (ref 3.77–5.28)
SODIUM SERPL-SCNC: 138 MMOL/L (ref 136–145)
WBC NRBC COR # BLD AUTO: 6.06 10*3/MM3 (ref 3.4–10.8)

## 2024-10-17 PROCEDURE — 25810000003 SODIUM CHLORIDE 0.9 % SOLUTION: Performed by: STUDENT IN AN ORGANIZED HEALTH CARE EDUCATION/TRAINING PROGRAM

## 2024-10-17 PROCEDURE — 88305 TISSUE EXAM BY PATHOLOGIST: CPT | Performed by: STUDENT IN AN ORGANIZED HEALTH CARE EDUCATION/TRAINING PROGRAM

## 2024-10-17 PROCEDURE — 45385 COLONOSCOPY W/LESION REMOVAL: CPT | Performed by: STUDENT IN AN ORGANIZED HEALTH CARE EDUCATION/TRAINING PROGRAM

## 2024-10-17 PROCEDURE — 80048 BASIC METABOLIC PNL TOTAL CA: CPT | Performed by: PHYSICIAN ASSISTANT

## 2024-10-17 PROCEDURE — 45380 COLONOSCOPY AND BIOPSY: CPT | Performed by: STUDENT IN AN ORGANIZED HEALTH CARE EDUCATION/TRAINING PROGRAM

## 2024-10-17 PROCEDURE — 25010000002 PROPOFOL 10 MG/ML EMULSION: Performed by: NURSE ANESTHETIST, CERTIFIED REGISTERED

## 2024-10-17 PROCEDURE — G0378 HOSPITAL OBSERVATION PER HR: HCPCS

## 2024-10-17 PROCEDURE — 85025 COMPLETE CBC W/AUTO DIFF WBC: CPT | Performed by: PHYSICIAN ASSISTANT

## 2024-10-17 PROCEDURE — 25810000003 SODIUM CHLORIDE 0.9 % SOLUTION: Performed by: NURSE ANESTHETIST, CERTIFIED REGISTERED

## 2024-10-17 RX ORDER — PROPOFOL 10 MG/ML
VIAL (ML) INTRAVENOUS AS NEEDED
Status: DISCONTINUED | OUTPATIENT
Start: 2024-10-17 | End: 2024-10-17 | Stop reason: SURG

## 2024-10-17 RX ORDER — SODIUM CHLORIDE 9 MG/ML
10 INJECTION, SOLUTION INTRAVENOUS ONCE
Status: COMPLETED | OUTPATIENT
Start: 2024-10-17 | End: 2024-10-17

## 2024-10-17 RX ORDER — POTASSIUM CHLORIDE 1500 MG/1
40 TABLET, EXTENDED RELEASE ORAL EVERY 4 HOURS
Status: DISCONTINUED | OUTPATIENT
Start: 2024-10-17 | End: 2024-10-17 | Stop reason: HOSPADM

## 2024-10-17 RX ORDER — SODIUM CHLORIDE 9 MG/ML
INJECTION, SOLUTION INTRAVENOUS CONTINUOUS PRN
Status: DISCONTINUED | OUTPATIENT
Start: 2024-10-17 | End: 2024-10-17 | Stop reason: SURG

## 2024-10-17 RX ADMIN — SODIUM CHLORIDE 10 ML/HR: 9 INJECTION, SOLUTION INTRAVENOUS at 06:44

## 2024-10-17 RX ADMIN — SODIUM CHLORIDE: 9 INJECTION, SOLUTION INTRAVENOUS at 07:00

## 2024-10-17 RX ADMIN — PROPOFOL 50 MG: 10 INJECTION, EMULSION INTRAVENOUS at 07:19

## 2024-10-17 RX ADMIN — ATENOLOL 50 MG: 50 TABLET ORAL at 10:36

## 2024-10-17 RX ADMIN — PANTOPRAZOLE SODIUM 40 MG: 40 INJECTION, POWDER, FOR SOLUTION INTRAVENOUS at 06:10

## 2024-10-17 RX ADMIN — POTASSIUM CHLORIDE 40 MEQ: 1500 TABLET, EXTENDED RELEASE ORAL at 10:36

## 2024-10-17 RX ADMIN — POTASSIUM CHLORIDE 40 MEQ: 1500 TABLET, EXTENDED RELEASE ORAL at 02:30

## 2024-10-17 RX ADMIN — CHLORTHALIDONE 25 MG: 25 TABLET ORAL at 10:36

## 2024-10-17 RX ADMIN — CLONIDINE HYDROCHLORIDE 0.2 MG: 0.1 TABLET ORAL at 10:36

## 2024-10-17 RX ADMIN — PROPOFOL 100 MG: 10 INJECTION, EMULSION INTRAVENOUS at 07:04

## 2024-10-17 RX ADMIN — PROPOFOL 50 MG: 10 INJECTION, EMULSION INTRAVENOUS at 07:14

## 2024-10-17 RX ADMIN — PROPOFOL 20 MG: 10 INJECTION, EMULSION INTRAVENOUS at 07:06

## 2024-10-17 RX ADMIN — PROPOFOL 50 MG: 10 INJECTION, EMULSION INTRAVENOUS at 07:23

## 2024-10-17 RX ADMIN — PROPOFOL 50 MG: 10 INJECTION, EMULSION INTRAVENOUS at 07:09

## 2024-10-17 NOTE — OUTREACH NOTE
Prep Survey      Flowsheet Row Responses   [facility] discharged from? [facility]   Is LACE score < 7 ? Yes   Eligibility [facility]   Date of Admission 10/16/24   Date of Discharge 10/17/24   Discharge Disposition Home or Self Care   Discharge diagnosis Left arm pain   Does the patient have one of the following disease processes/diagnoses(primary or secondary)? Other   Does the patient have Home health ordered? No   Is there a DME ordered? No   Prep survey completed? Yes            EROS JOE - Registered Nurse

## 2024-10-17 NOTE — ANESTHESIA PREPROCEDURE EVALUATION
Anesthesia Evaluation     history of anesthetic complications:  prolonged sedation  NPO Solid Status: > 8 hours  NPO Liquid Status: > 8 hours           Airway   Mallampati: II  TM distance: >3 FB  Neck ROM: full  No difficulty expected  Dental - normal exam     Pulmonary - normal exam   (+) asthma,  Cardiovascular - normal exam    (+) hypertension, hyperlipidemia    ROS comment: ·  Myocardial perfusion imaging indicates a normal myocardial perfusion study with no evidence of ischemia. Impressions are consistent with a low risk study.  ·  Left ventricular ejection fraction is hyperdynamic (Calculated EF > 70%).      Neuro/Psych  (+) headaches, numbness, psychiatric history  GI/Hepatic/Renal/Endo    (+) GERD, PUD    Musculoskeletal     Abdominal  - normal exam    Bowel sounds: normal.   Substance History      OB/GYN          Other                          Anesthesia Plan    ASA 3     MAC   total IV anesthesia  intravenous induction     Anesthetic plan, risks, benefits, and alternatives have been provided, discussed and informed consent has been obtained with: patient.  Pre-procedure education provided  Plan discussed with CRNA.        CODE STATUS:    Code Status (Patient has no pulse and is not breathing): CPR (Attempt to Resuscitate)  Medical Interventions (Patient has pulse or is breathing): Full Support

## 2024-10-17 NOTE — OP NOTE
[facility] [place] Colorectal Surgery  Colonoscopy Examination     Name: Priscilla Fragoso  : 1955  Date of Colonoscopy: 10/17/2024  Procedure: Average Risk Screening Colonoscopy  Surgeon: Joshua Lindsay MD   Anesthesia: Sedation   IV Fluids: refer to anesthesia record    Procedure Details:    Prior to the procedure, history and physical exam was performed. Patient's medication and allergies were reviewed. The risk and benefits of the procedure and sedation options and risks were discussed with the patient. All questions were answered and informed consent was obtained.    The patient was brought to the endoscopy suite and placed in the left lateral decubitus position. Conscious sedation was administered by the anesthesia team. Vital signs including blood pressure, heart rate, and oxygen saturation were monitored continuously throughout the procedure.    The colonoscope was introduced through the rectum and advanced through the entire colon under direct visualization. The scope was maneuvered carefully, and the mucosa of the rectum, sigmoid colon, descending colon, transverse colon, ascending colon, and cecum were thoroughly inspected. Adequate insufflation was maintained throughout the procedure for optimal visualization.    Findings and Interventions:  Poor prep with liquid stool and staining or colonic wall.     Rectum: Grade II internal hemorrhoids, Thrombosed external hemorrhoid , with no lesions,polyps, or abnormalities.  Sigmoid colon: Normal appearance with no lesions,polyps, or abnormalities.                                                     Descending colon: Normal appearance with no lesions,polyps, or abnormalities.  Transverse colon: Normal appearance with no lesions,polyps, or abnormalities.  Ascending colon: Normal appearance with no lesions,polyps, or abnormalities.  Cecum: Sessile 15 mm polyp removed with cold snare, area surrounding polyp was biopsied with cold biopsy forceps     Procedure Images:       Attached in a separate file       Interventions:  During the procedure, a sessile polyp measuring 15mm in size was identified in the cecum. The polyp was visualized and removed using cold snare. Area around the polyp was biopsied. Hemostasis was achieved successfully at the site of polypectomy without any immediate complications.     Specimens:  The removed polyp was retrieved and sent for histopathological examination to the pathology department for further analysis.    Recommendation:     Repeat colonoscopy in 3 years   Follow up pathology   Regular diet   Clear for discharge     Conclusion:  The screening colonoscopy was completed successfully, and the entire colon was visualized without any complications. The patient tolerated the procedure well and was transferred to the recovery area in stable condition. Post-procedure instructions and follow-up were discussed with the patient and will be provided in written form.    Joshua Lindsay MD  Colon and Rectal Surgery   [address], [address]  T: [phone]

## 2024-10-17 NOTE — DISCHARGE SUMMARY
[facility]    Zoe Mohr MD    CHIEF COMPLAINT:     Left chest, arm pain ED  69-year-old female who was 1 to get a colonoscopy when she was in the preop area she suddenly had some pain that shot in her neck down her left arm. It is gone now. They did an EKG and they sent her to the ER. No chest pain or shortness of breath or sweating. She states she fell a couple weeks ago and she has been having some intermittent neck and shoulder pain since that time. Denies any fever chills sweats cough congestion vomiting diarrhea leg pain or swelling long car ride plane or immobilization. Denies any other numbness ting or weakness or head injury. Comes and goes sharp in nature intermittent short-lived.      Observation 10/16/24  Stress test pending. Colorectal consulted    HISTORY OF PRESENT ILLNESS:    Neck Pain           Past Medical History:   Diagnosis Date    Anesthesia complication     stated was given too much    Asthma     GERD (gastroesophageal reflux disease)     GI bleed     Headache     Hyperlipidemia     Hypertension     Peptic ulceration     PONV (postoperative nausea and vomiting)      Past Surgical History:   Procedure Laterality Date    EYE SURGERY      TUBAL ABDOMINAL LIGATION       History reviewed. No pertinent family history.  Social History     Tobacco Use    Smoking status: Former     Current packs/day: 0.00     Average packs/day: 3.0 packs/day for 15.1 years (45.4 ttl pk-yrs)     Types: Cigarettes     Start date:      Quit date: 1994     Years since quittin.6     Passive exposure: Past    Smokeless tobacco: Never   Vaping Use    Vaping status: Never Used   Substance Use Topics    Alcohol use: Not Currently    Drug use: Never     Medications Prior to Admission   Medication Sig Dispense Refill Last Dose/Taking    albuterol (ACCUNEB) 0.63 MG/3ML nebulizer solution Take 3 mL by nebulization Every Night. 100 each 3 Taking    albuterol sulfate  (90 Base)  MCG/ACT inhaler Inhale 2 puffs Every 4 (Four) Hours As Needed for Wheezing. 54 g 3 Taking As Needed    aspirin 325 MG tablet Take 1 tablet by mouth Daily.   Taking    atenolol-chlorthalidone (TENORETIC) 50-25 MG per tablet TAKE 1 TABLET BY MOUTH DAILY 90 tablet 1 Taking    B-COMPLEX-C PO Take  by mouth.   Taking    cloNIDine (CATAPRES) 0.2 MG tablet TAKE 1 TABLET BY MOUTH TWICE DAILY 180 tablet 1 Taking    cyclobenzaprine (FLEXERIL) 5 MG tablet Take 1 tablet by mouth 3 (Three) Times a Day As Needed for Muscle Spasms. 90 tablet 1 Taking As Needed    D3 Maximum Strength 125 MCG (5000 UT) capsule capsule TAKE 1 CAPSULE BY MOUTH DAILY 90 capsule 1 Taking    esomeprazole (nexIUM) 20 MG capsule Take 1 capsule by mouth Every Morning Before Breakfast. 90 capsule 3 Taking    hydrOXYzine (ATARAX) 25 MG tablet TAKE 1 TABLET BY MOUTH THREE TIMES DAILY AS NEEDED FOR ITCHING 90 tablet 1 Taking As Needed    montelukast (SINGULAIR) 10 MG tablet TAKE 1 TABLET BY MOUTH EVERY NIGHT 90 tablet 3 Taking    Repatha solution prefilled syringe injection INJECT 1 MILLILITER UNDER THE SKIN AS DIRECTED EVERY 14 DAYS 6 mL 3 Past Week    cloNIDine (CATAPRES) 0.2 MG tablet Take 1 tablet by mouth 2 (Two) Times a Day.       famotidine (PEPCID) 20 MG tablet TAKE 1 TABLET BY MOUTH AT NIGHT AS NEEDED FOR HEARTBURN 90 tablet 1      Allergies:  Codeine, Penicillins, Sulfa antibiotics, and Tetanus toxoids    Immunization History   Administered Date(s) Administered    COVID-19 (MODERNA) 12YRS+ (SPIKEVAX) 10/05/2023    COVID-19 (MODERNA) Monovalent Original Booster 11/15/2021, 08/12/2022    Fluad Quad 65+ 11/03/2022    Fluzone High-Dose 65+yrs 10/05/2023    Influenza, Unspecified 12/15/2022    Pneumococcal Conjugate 20-Valent (PCV20) 11/03/2022, 10/05/2023           REVIEW OF SYSTEMS:    Review of Systems   Musculoskeletal:  Positive for neck pain.       Cardiovascular:  Positive for chest pain.   Musculoskeletal:  Positive for neck pain.        Left arm  pain     Vital Signs  Temp:  [98.1 °F (36.7 °C)-98.7 °F (37.1 °C)] 98.3 °F (36.8 °C)  Heart Rate:  [58-88] 65  Resp:  [10-18] 12  BP: (117-218)/() 136/62          Physical Exam:  Physical Exam  Constitutional:       Appearance: Normal appearance.   Cardiovascular:      Rate and Rhythm: Normal rate and regular rhythm.   Pulmonary:      Effort: Pulmonary effort is normal.      Breath sounds: Normal breath sounds.   Neurological:      General: No focal deficit present.      Mental Status: She is alert and oriented to person, place, and time.   Psychiatric:         Mood and Affect: Mood normal.         Behavior: Behavior normal.       Emotional Behavior:    wnl   Debilities:   None      Results Review:    I reviewed the patient's new clinical results.  Lab Results (most recent)       Procedure Component Value Units Date/Time    CBC & Differential [[id number]]  (Abnormal) Collected: 10/17/24 0111    Specimen: Blood from Arm, Right Updated: 10/17/24 0151    Narrative:      The following orders were created for panel order CBC & Differential.  Procedure                               Abnormality         Status                     ---------                               -----------         ------                     CBC Auto Differential[[id number]]        Abnormal            Final result               Scan Slide[[id number]]                                                                    Please view results for these tests on the individual orders.    CBC Auto Differential [[id number]]  (Abnormal) Collected: 10/17/24 0111    Specimen: Blood from Arm, Right Updated: 10/17/24 0151     WBC 6.06 10*3/mm3      RBC 3.38 10*6/mm3      Hemoglobin 10.5 g/dL      Hematocrit 31.9 %      MCV 94.4 fL      MCH 31.1 pg      MCHC 32.9 g/dL      RDW 15.5 %      RDW-SD 53.4 fl      MPV 12.5 fL      Platelets 100 10*3/mm3      Neutrophil % 39.6 %      Lymphocyte % 44.6 %      Monocyte % 11.1 %      Eosinophil % 3.3 %      Basophil % 1.2 %       Immature Grans % 0.2 %      Neutrophils, Absolute 2.41 10*3/mm3      Lymphocytes, Absolute 2.70 10*3/mm3      Monocytes, Absolute 0.67 10*3/mm3      Eosinophils, Absolute 0.20 10*3/mm3      Basophils, Absolute 0.07 10*3/mm3      Immature Grans, Absolute 0.01 10*3/mm3      nRBC 0.0 /100 WBC     Basic Metabolic Panel [[id number]]  (Abnormal) Collected: 10/17/24 0111    Specimen: Blood from Arm, Right Updated: 10/17/24 0142     Glucose 117 mg/dL      BUN 4 mg/dL      Creatinine 0.73 mg/dL      Sodium 138 mmol/L      Potassium 3.1 mmol/L      Chloride 102 mmol/L      CO2 26.7 mmol/L      Calcium 8.2 mg/dL      BUN/Creatinine Ratio 5.5     Anion Gap 9.3 mmol/L      eGFR 88.0 mL/min/1.73     Narrative:      GFR Normal >60  Chronic Kidney Disease <60  Kidney Failure <15    The GFR formula is only valid for adults with stable renal function between ages 18 and 70.    High Sensitivity Troponin T 2Hr [[id number]]  (Normal) Collected: 10/16/24 1102    Specimen: Blood Updated: 10/16/24 1130     HS Troponin T 6 ng/L      Troponin T Delta -3 ng/L     Narrative:      High Sensitive Troponin T Reference Range:  <14.0 ng/L- Negative Female for AMI  <22.0 ng/L- Negative Male for AMI  >=14 - Abnormal Female indicating possible myocardial injury.  >=22 - Abnormal Male indicating possible myocardial injury.   Clinicians would have to utilize clinical acumen, EKG, Troponin, and serial changes to determine if it is an Acute Myocardial Infarction or myocardial injury due to an underlying chronic condition.         Comprehensive Metabolic Panel [[id number]]  (Abnormal) Collected: 10/16/24 0748    Specimen: Blood Updated: 10/16/24 0904     Glucose 114 mg/dL      BUN 4 mg/dL      Creatinine 0.89 mg/dL      Sodium 140 mmol/L      Potassium 3.7 mmol/L      Chloride 103 mmol/L      CO2 24.8 mmol/L      Calcium 8.6 mg/dL      Total Protein 8.5 g/dL      Albumin 3.3 g/dL      ALT (SGPT) 32 U/L      AST (SGOT) 128 U/L      Alkaline Phosphatase  124 U/L      Total Bilirubin 2.2 mg/dL      Globulin 5.2 gm/dL      A/G Ratio 0.6 g/dL      BUN/Creatinine Ratio 4.5     Anion Gap 12.2 mmol/L      eGFR 69.4 mL/min/1.73     Narrative:      GFR Normal >60  Chronic Kidney Disease <60  Kidney Failure <15    The GFR formula is only valid for adults with stable renal function between ages 18 and 70.    High Sensitivity Troponin T [[id number]]  (Normal) Collected: 10/16/24 0748    Specimen: Blood Updated: 10/16/24 0816     HS Troponin T 9 ng/L     Narrative:      High Sensitive Troponin T Reference Range:  <14.0 ng/L- Negative Female for AMI  <22.0 ng/L- Negative Male for AMI  >=14 - Abnormal Female indicating possible myocardial injury.  >=22 - Abnormal Male indicating possible myocardial injury.   Clinicians would have to utilize clinical acumen, EKG, Troponin, and serial changes to determine if it is an Acute Myocardial Infarction or myocardial injury due to an underlying chronic condition.         CBC & Differential [[id number]]  (Abnormal) Collected: 10/16/24 0748    Specimen: Blood Updated: 10/16/24 0801    Narrative:      The following orders were created for panel order CBC & Differential.  Procedure                               Abnormality         Status                     ---------                               -----------         ------                     CBC Auto Differential[[id number]]        Abnormal            Final result               Scan Slide[[id number]]                   Normal              Final result                 Please view results for these tests on the individual orders.    Scan Slide [[id number]]  (Normal) Collected: 10/16/24 0748    Specimen: Blood Updated: 10/16/24 0801     RBC Morphology Normal     WBC Morphology Normal     Platelet Morphology Normal    Narrative:      Slide Reviewed     CBC Auto Differential [[id number]]  (Abnormal) Collected: 10/16/24 0748    Specimen: Blood Updated: 10/16/24 0801     WBC 7.43 10*3/mm3      RBC 3.91  10*6/mm3      Hemoglobin 12.6 g/dL      Hematocrit 36.8 %      MCV 94.1 fL      MCH 32.2 pg      MCHC 34.2 g/dL      RDW 15.8 %      RDW-SD 54.6 fl      MPV 12.7 fL      Platelets 134 10*3/mm3      Neutrophil % 60.9 %      Lymphocyte % 24.8 %      Monocyte % 11.4 %      Eosinophil % 1.5 %      Basophil % 1.3 %      Immature Grans % 0.1 %      Neutrophils, Absolute 4.52 10*3/mm3      Lymphocytes, Absolute 1.84 10*3/mm3      Monocytes, Absolute 0.85 10*3/mm3      Eosinophils, Absolute 0.11 10*3/mm3      Basophils, Absolute 0.10 10*3/mm3      Immature Grans, Absolute 0.01 10*3/mm3      nRBC 0.0 /100 WBC             Imaging Results (Most Recent)       Procedure Component Value Units Date/Time    XR Shoulder 2+ View Left [[id number]] Collected: 10/16/24 0832     Updated: 10/16/24 0840    Narrative:      XR SHOULDER 2+ VW LEFT    Date of Exam: 10/16/2024 8:24 AM EDT    Indication: Fall    Comparison: None available.    Findings:  Unremarkable radiographic appearance of the shoulder soft tissues. No acute fracture or traumatic malalignment. There are mild degenerative changes of the glenohumeral and acromioclavicular joints. No high-grade narrowing of the subacromial space. No   acute findings in the partially imaged left chest.      Impression:      Impression:  No acute fracture or traumatic malalignment.      Electronically Signed: Jaswant Jeff MD    10/16/2024 8:37 AM EDT    Workstation ID: [id number]    XR Chest 1 View [[id number]] Collected: 10/16/24 0832     Updated: 10/16/24 0835    Narrative:      XR CHEST 1 VW    Date of Exam: 10/16/2024 8:00 AM EDT    Indication: Chest pain    Comparison: 8/21/2023.    Findings:  Heart and pulmonary vessels appear within normal limits. Lung fields are clear of acute infiltrates or effusions. There is no pneumothorax.      Impression:      Impression:  No acute process.      Electronically Signed: Emy Smiley MD    10/16/2024 8:33 AM EDT    Workstation ID: [id number]    XR Spine  Cervical Complete 4 or 5 View [[id number]] Collected: 10/16/24 0830     Updated: 10/16/24 0834    Narrative:      XR SPINE CERVICAL COMPLETE 4 OR 5 VW    Date of Exam: 10/16/2024 8:00 AM EDT    Indication: Fall    Comparison: 8/22/2023  Findings:  Cervical spine with oblique views for total of 6 films. There is carotid calcification, greatest on the right. There are normal vertebral body heights. There is mild narrowing of the C5-6 and C6-7 disc interspaces with mild spurring. There is normal   alignment. There is moderately severe right neural foraminal stenosis at C3-4 and C5-6. There is mild right neural foraminal stenosis at C4-5. There is mild left neural foraminal stenosis at C5-6. No fractures are identified.      Impression:      Impression:  Chronic findings as detailed. No acute process.      Electronically Signed: Emy Smiley MD    10/16/2024 8:32 AM EDT    Workstation ID: [id number]          reviewed    ECG/EMG Results (most recent)       Procedure Component Value Units Date/Time    Telemetry Scan [[id number]] Resulted: 10/16/24     Updated: 10/17/24 0216    Telemetry Scan [[id number]] Resulted: 10/16/24     Updated: 10/17/24 0216    Telemetry Scan [[id number]] Resulted: 10/16/24     Updated: 10/17/24 0253    Telemetry Scan [[id number]] Resulted: 10/16/24     Updated: 10/17/24 0440    ECG 12 Lead Chest Pain [[id number]] Collected: 10/16/24 0747     Updated: 10/17/24 0725     QT Interval 421 ms      QTC Interval 473 ms     Narrative:      HEART RATE=76  bpm  RR Btrtmffo=792  ms  IA Khfoqexn=421  ms  P Horizontal Axis=-21  deg  P Front Axis=1  deg  QRSD Interval=80  ms  QT Rlqwpvxj=555  ms  TWtW=543  ms  QRS Axis=35  deg  T Wave Axis=31  deg  - BORDERLINE ECG -  Sinus rhythm  ST changes in lateral leads  When compared with ECG of 16-Oct-2024 07:12:29,  Significant repolarization change  Electronically Signed By: Brennan Lester ([facility]) 2024-10-17 07:24:51  Date and Time of Study:2024-10-16 07:47:40           reviewed    Results for orders placed during the hospital encounter of 10/09/23    Duplex Carotid Ultrasound CAR    Interpretation Summary    Right internal carotid artery demonstrates a 50-69% stenosis.    Left internal carotid artery demonstrates a less than 50% stenosis.          Microbiology Results (last 10 days)       ** No results found for the last 240 hours. **            Assessment & Plan     Left arm pain     Chest pain        Lab Results   Component Value Date     TROPONINT 6 10/16/2024     TROPONINT 9 10/16/2024      -cbc, cmp, unremarkable  -Chest X-ray:reviewed and showing no acute process  -EKG:rate 76 sinus  -Stress Test performed and is low risk for ischemia  -Telemetry        Colonoscopy  - pt was here to have coloscopy when pain developed  - colorectal consulted  - colonoscopy performed without complication, 1 polyp removed, repeat in 3 years    I discussed the patients findings and my recommendations with patient and nursing staff.     Discharge Diagnosis:      Left arm pain      Hospital Course  Patient is a 69 y.o. female presented with left arm and chest pain. Er evaluated and admitted to observation. Labs including cbc, cmp are unremarkable. Chest xray was normal. EKG rate 76 sinus. Stress test was low risk for ischemia. Cardiology cleared.pt for colonoscopy. Colorectal consulted and colonoscopy performed without complication. Discharge discussed with pt and she is agreeable to plan. Instructed pt to return to er if symptoms reoccur or worsen.      Past Medical History:     Past Medical History:   Diagnosis Date    Anesthesia complication     stated was given too much    Asthma     GERD (gastroesophageal reflux disease)     GI bleed     Headache     Hyperlipidemia     Hypertension     Peptic ulceration     PONV (postoperative nausea and vomiting)        Past Surgical History:     Past Surgical History:   Procedure Laterality Date    EYE SURGERY      TUBAL ABDOMINAL LIGATION          Social History:   Social History     Socioeconomic History    Marital status:    Tobacco Use    Smoking status: Former     Current packs/day: 0.00     Average packs/day: 3.0 packs/day for 15.1 years (45.4 ttl pk-yrs)     Types: Cigarettes     Start date:      Quit date: 1994     Years since quittin.6     Passive exposure: Past    Smokeless tobacco: Never   Vaping Use    Vaping status: Never Used   Substance and Sexual Activity    Alcohol use: Not Currently    Drug use: Never    Sexual activity: Defer       Procedures Performed    Procedure(s):  COLONOSCOPY WITH POLYPECTOMY X 1, BIOPSY X 1  -------------------  10/17 06 Colonoscopy    Consults:   Consults       Date and Time Order Name Status Description    10/16/2024  2:18 PM Inpatient Colorectal Surgery Consult      10/16/2024  9:29 AM Inpatient Cardiology Consult Completed             Condition on Discharge:     Stable    Discharge Disposition      Discharge Medications     Discharge Medications        Continue These Medications        Instructions Start Date   albuterol 0.63 MG/3ML nebulizer solution  Commonly known as: ACCUNEB   0.63 mg, Nebulization, Nightly      albuterol sulfate  (90 Base) MCG/ACT inhaler  Commonly known as: PROVENTIL HFA;VENTOLIN HFA;PROAIR HFA   2 puffs, Inhalation, Every 4 Hours PRN      aspirin 325 MG tablet   325 mg, Daily      atenolol-chlorthalidone 50-25 MG per tablet  Commonly known as: TENORETIC   1 tablet, Oral, Daily      B-COMPLEX-C PO   Take  by mouth.      cloNIDine 0.2 MG tablet  Commonly known as: CATAPRES   TAKE 1 TABLET BY MOUTH TWICE DAILY      cloNIDine 0.2 MG tablet  Commonly known as: CATAPRES   0.2 mg, Oral, 2 Times Daily      cyclobenzaprine 5 MG tablet  Commonly known as: FLEXERIL   5 mg, Oral, 3 Times Daily PRN      D3 Maximum Strength 125 MCG (5000 UT) capsule capsule  Generic drug: vitamin D3   5,000 Units, Oral, Daily      esomeprazole 20 MG capsule  Commonly known as:  nexIUM   20 mg, Oral, Every Morning Before Breakfast      famotidine 20 MG tablet  Commonly known as: PEPCID   20 mg, Oral, Nightly PRN      hydrOXYzine 25 MG tablet  Commonly known as: ATARAX   25 mg, Oral, 3 Times Daily PRN      montelukast 10 MG tablet  Commonly known as: SINGULAIR   10 mg, Oral, Nightly      Repatha solution prefilled syringe injection  Generic drug: Evolocumab   INJECT 1 MILLILITER UNDER THE SKIN AS DIRECTED EVERY 14 DAYS               Discharge Diet:     Activity at Discharge:     Follow-up Appointments  Future Appointments   Date Time Provider Department Center   11/12/2024 10:00 AM Zoe Mohr MD [facility]         Test Results Pending at Discharge  Pending Labs       Order Current Status    Tissue Pathology Exam Collected (10/17/24 0716)             Risk for Readmission (LACE) Score: 3 (10/17/2024  6:00 AM)      Less Than 30 minutes spent in discharge activities for this patient    Signature:Electronically signed by Lisa Martínez PA-C, 10/17/24, 8:00 AM EDT.

## 2024-10-17 NOTE — ANESTHESIA POSTPROCEDURE EVALUATION
Patient: Priscilla Fragoso    Procedure Summary       Date: 10/17/24 Room / Location: [facility] ENDOSCOPY 1 / [facility] ENDOSCOPY    Anesthesia Start: 0700 Anesthesia Stop: 0732    Procedure: COLONOSCOPY WITH POLYPECTOMY X 1, BIOPSY X 1 (Rectum) Diagnosis:       Screening for colon cancer      (Screening for colon cancer [Z12.11])    Surgeons: Joshua Lindsay MD Provider: Rashid Jorgensen MD    Anesthesia Type: MAC ASA Status: 3            Anesthesia Type: MAC    Vitals  Vitals Value Taken Time   /62 10/17/24 0758   Temp     Pulse 65 10/17/24 0758   Resp 12 10/17/24 0758   SpO2 100 % 10/17/24 0758           Post Anesthesia Care and Evaluation    Patient location during evaluation: PACU  Patient participation: complete - patient participated  Level of consciousness: awake  Pain scale: See nurse's notes for pain score.  Pain management: adequate    Airway patency: patent  Anesthetic complications: No anesthetic complications  PONV Status: none  Cardiovascular status: acceptable  Respiratory status: acceptable and spontaneous ventilation  Hydration status: acceptable    Comments: Patient seen and examined postoperatively; vital signs stable; SpO2 greater than or equal to 90%; cardiopulmonary status stable; nausea/vomiting adequately controlled; pain adequately controlled; no apparent anesthesia complications; patient discharged from anesthesia care when discharge criteria were met

## 2024-10-18 ENCOUNTER — TRANSITIONAL CARE MANAGEMENT TELEPHONE ENCOUNTER (OUTPATIENT)
Dept: CALL CENTER | Facility: HOSPITAL | Age: 69
End: 2024-10-18
Payer: MEDICARE

## 2024-10-18 LAB
LAB AP CASE REPORT: NORMAL
LAB AP DIAGNOSIS COMMENT: NORMAL
PATH REPORT.FINAL DX SPEC: NORMAL
PATH REPORT.GROSS SPEC: NORMAL

## 2024-10-18 NOTE — OUTREACH NOTE
Call Center TCM Note      Flowsheet Row Responses   [facility] facility patient discharged from? [facility]   Does the patient have one of the following disease processes/diagnoses(primary or secondary)? Other   TCM attempt successful? No   Unsuccessful attempts Attempt 1            Sheila Almeida RN    10/18/2024, 15:30 EDT

## 2024-10-18 NOTE — OUTREACH NOTE
Call Center TCM Note      Flowsheet Row Responses   [facility] facility patient discharged from? [facility]   Does the patient have one of the following disease processes/diagnoses(primary or secondary)? Other   TCM attempt successful? No   Unsuccessful attempts Attempt 2            Sheila Almeida RN    10/18/2024, 15:59 EDT

## 2024-10-19 ENCOUNTER — TRANSITIONAL CARE MANAGEMENT TELEPHONE ENCOUNTER (OUTPATIENT)
Dept: CALL CENTER | Facility: HOSPITAL | Age: 69
End: 2024-10-19
Payer: MEDICARE

## 2024-10-19 LAB
QT INTERVAL: 434 MS
QTC INTERVAL: 485 MS

## 2024-10-19 NOTE — OUTREACH NOTE
Call Center TCM Note      Flowsheet Row Responses   [facility] patient discharged from? [facility]   Does the patient have one of the following disease processes/diagnoses(primary or secondary)? Other   TCM attempt successful? No   Unsuccessful attempts Attempt 3  [no verbal release on file from PCP]            Sanna Morejon RN    10/19/2024, 12:38 EDT

## 2024-10-22 NOTE — PATIENT INSTRUCTIONS
Health Maintenance Due   Topic Date Due    MAMMOGRAM  Never done    DXA SCAN  Never done    ZOSTER VACCINE (1 of 2) Never done    INFLUENZA VACCINE  08/01/2024    COVID-19 Vaccine (4 - 2023-24 season) 09/01/2024    You are due for Shingrix vaccination series ( the newest shingles vaccine).  It is a two shot series spaced 2-6 months apart. Please get this vaccine series started at your earliest convenience at your local pharmacy to help avoid shingles outbreak. It is more effective than the old Zostavax vaccine and is recommended even if you have had the Zostavax vaccine in the past.  Once the Shingrix series is completed, it does not need to be repeated.   For more information, please look at the website below:  https://www.cdc.gov/vaccines/vpd/shingles/public/shingrix/index.html    Check blood pressure cuff for accuracy and send 10 blood pressures over 2 weeks.  Watch sodium, alcohol and weight     Increase clear fluids and call if heart irregularities return or can't stop vomiting

## 2024-10-24 ENCOUNTER — OFFICE VISIT (OUTPATIENT)
Dept: FAMILY MEDICINE CLINIC | Facility: CLINIC | Age: 69
End: 2024-10-24
Payer: MEDICARE

## 2024-10-24 ENCOUNTER — LAB (OUTPATIENT)
Dept: FAMILY MEDICINE CLINIC | Facility: CLINIC | Age: 69
End: 2024-10-24
Payer: MEDICARE

## 2024-10-24 VITALS
DIASTOLIC BLOOD PRESSURE: 85 MMHG | BODY MASS INDEX: 25.72 KG/M2 | HEIGHT: 60 IN | WEIGHT: 131 LBS | TEMPERATURE: 98.7 F | OXYGEN SATURATION: 97 % | HEART RATE: 75 BPM | SYSTOLIC BLOOD PRESSURE: 148 MMHG

## 2024-10-24 DIAGNOSIS — Z23 NEED FOR COVID-19 VACCINE: ICD-10-CM

## 2024-10-24 DIAGNOSIS — Z78.0 POSTMENOPAUSE: ICD-10-CM

## 2024-10-24 DIAGNOSIS — I10 PRIMARY HYPERTENSION: ICD-10-CM

## 2024-10-24 DIAGNOSIS — E66.3 OVERWEIGHT WITH BODY MASS INDEX (BMI) OF 25 TO 25.9 IN ADULT: ICD-10-CM

## 2024-10-24 DIAGNOSIS — I10 PRIMARY HYPERTENSION: Primary | ICD-10-CM

## 2024-10-24 DIAGNOSIS — F33.40 RECURRENT MAJOR DEPRESSIVE DISORDER, IN REMISSION: ICD-10-CM

## 2024-10-24 DIAGNOSIS — M79.602 LEFT ARM PAIN: ICD-10-CM

## 2024-10-24 DIAGNOSIS — Z12.31 ENCOUNTER FOR SCREENING MAMMOGRAM FOR MALIGNANT NEOPLASM OF BREAST: ICD-10-CM

## 2024-10-24 DIAGNOSIS — R11.2 NAUSEA AND VOMITING, UNSPECIFIED VOMITING TYPE: ICD-10-CM

## 2024-10-24 DIAGNOSIS — Z12.11 ENCOUNTER FOR SCREENING COLONOSCOPY: ICD-10-CM

## 2024-10-24 LAB
ALBUMIN SERPL-MCNC: 3 G/DL (ref 3.5–5.2)
ALBUMIN/GLOB SERPL: 0.5 G/DL
ALP SERPL-CCNC: 85 U/L (ref 39–117)
ALT SERPL W P-5'-P-CCNC: 35 U/L (ref 1–33)
ANION GAP SERPL CALCULATED.3IONS-SCNC: 11.2 MMOL/L (ref 5–15)
AST SERPL-CCNC: 106 U/L (ref 1–32)
BASOPHILS # BLD AUTO: 0.09 10*3/MM3 (ref 0–0.2)
BASOPHILS NFR BLD AUTO: 1.6 % (ref 0–1.5)
BILIRUB SERPL-MCNC: 2.8 MG/DL (ref 0–1.2)
BUN SERPL-MCNC: 6 MG/DL (ref 8–23)
BUN/CREAT SERPL: 5 (ref 7–25)
CALCIUM SPEC-SCNC: 8.4 MG/DL (ref 8.6–10.5)
CHLORIDE SERPL-SCNC: 99 MMOL/L (ref 98–107)
CO2 SERPL-SCNC: 28.8 MMOL/L (ref 22–29)
CREAT SERPL-MCNC: 1.21 MG/DL (ref 0.57–1)
DEPRECATED RDW RBC AUTO: 44 FL (ref 37–54)
EGFRCR SERPLBLD CKD-EPI 2021: 48 ML/MIN/1.73
EOSINOPHIL # BLD AUTO: 0.13 10*3/MM3 (ref 0–0.4)
EOSINOPHIL NFR BLD AUTO: 2.2 % (ref 0.3–6.2)
ERYTHROCYTE [DISTWIDTH] IN BLOOD BY AUTOMATED COUNT: 13.3 % (ref 12.3–15.4)
GLOBULIN UR ELPH-MCNC: 5.6 GM/DL
GLUCOSE SERPL-MCNC: 108 MG/DL (ref 65–99)
HCT VFR BLD AUTO: 38.1 % (ref 34–46.6)
HGB BLD-MCNC: 13.1 G/DL (ref 12–15.9)
IMM GRANULOCYTES # BLD AUTO: 0.01 10*3/MM3 (ref 0–0.05)
IMM GRANULOCYTES NFR BLD AUTO: 0.2 % (ref 0–0.5)
LYMPHOCYTES # BLD AUTO: 1.66 10*3/MM3 (ref 0.7–3.1)
LYMPHOCYTES NFR BLD AUTO: 28.7 % (ref 19.6–45.3)
MAGNESIUM SERPL-MCNC: 1.6 MG/DL (ref 1.6–2.4)
MCH RBC QN AUTO: 31.6 PG (ref 26.6–33)
MCHC RBC AUTO-ENTMCNC: 34.4 G/DL (ref 31.5–35.7)
MCV RBC AUTO: 92 FL (ref 79–97)
MONOCYTES # BLD AUTO: 1 10*3/MM3 (ref 0.1–0.9)
MONOCYTES NFR BLD AUTO: 17.3 % (ref 5–12)
NEUTROPHILS NFR BLD AUTO: 2.9 10*3/MM3 (ref 1.7–7)
NEUTROPHILS NFR BLD AUTO: 50 % (ref 42.7–76)
PLATELET # BLD AUTO: 146 10*3/MM3 (ref 140–450)
PMV BLD AUTO: 12.7 FL (ref 6–12)
POTASSIUM SERPL-SCNC: 2.8 MMOL/L (ref 3.5–5.2)
PROT SERPL-MCNC: 8.6 G/DL (ref 6–8.5)
RBC # BLD AUTO: 4.14 10*6/MM3 (ref 3.77–5.28)
SODIUM SERPL-SCNC: 139 MMOL/L (ref 136–145)
WBC NRBC COR # BLD AUTO: 5.79 10*3/MM3 (ref 3.4–10.8)

## 2024-10-24 PROCEDURE — 90662 IIV NO PRSV INCREASED AG IM: CPT | Performed by: PREVENTIVE MEDICINE

## 2024-10-24 PROCEDURE — 83735 ASSAY OF MAGNESIUM: CPT | Performed by: PREVENTIVE MEDICINE

## 2024-10-24 PROCEDURE — G0008 ADMIN INFLUENZA VIRUS VAC: HCPCS | Performed by: PREVENTIVE MEDICINE

## 2024-10-24 PROCEDURE — 1160F RVW MEDS BY RX/DR IN RCRD: CPT | Performed by: PREVENTIVE MEDICINE

## 2024-10-24 PROCEDURE — 85025 COMPLETE CBC W/AUTO DIFF WBC: CPT | Performed by: PREVENTIVE MEDICINE

## 2024-10-24 PROCEDURE — 80053 COMPREHEN METABOLIC PANEL: CPT | Performed by: PREVENTIVE MEDICINE

## 2024-10-24 PROCEDURE — 1159F MED LIST DOCD IN RCRD: CPT | Performed by: PREVENTIVE MEDICINE

## 2024-10-24 PROCEDURE — 91320 SARSCV2 VAC 30MCG TRS-SUC IM: CPT | Performed by: PREVENTIVE MEDICINE

## 2024-10-24 PROCEDURE — 1126F AMNT PAIN NOTED NONE PRSNT: CPT | Performed by: PREVENTIVE MEDICINE

## 2024-10-24 PROCEDURE — 99214 OFFICE O/P EST MOD 30 MIN: CPT | Performed by: PREVENTIVE MEDICINE

## 2024-10-24 PROCEDURE — 90480 ADMN SARSCOV2 VAC 1/ONLY CMP: CPT | Performed by: PREVENTIVE MEDICINE

## 2024-10-24 PROCEDURE — 3079F DIAST BP 80-89 MM HG: CPT | Performed by: PREVENTIVE MEDICINE

## 2024-10-24 PROCEDURE — 36415 COLL VENOUS BLD VENIPUNCTURE: CPT

## 2024-10-24 PROCEDURE — 3077F SYST BP >= 140 MM HG: CPT | Performed by: PREVENTIVE MEDICINE

## 2024-10-24 RX ORDER — FLUTICASONE PROPIONATE 50 UG/1
2 SPRAY, METERED NASAL DAILY
Qty: 1 G | Refills: 0 | Status: SHIPPED | OUTPATIENT
Start: 2024-10-24

## 2024-10-24 NOTE — PROGRESS NOTES
Subjective   Priscilla Fragoso is a 69 y.o. female presents for   Chief Complaint   Patient presents with    Hypertension     While having a colonoscopy    Hospital Follow Up Visit     Due to hypertension       Health Maintenance Due   Topic Date Due    MAMMOGRAM  Never done    DXA SCAN  Never done    ZOSTER VACCINE (1 of 2) Never done   Patient's sister was present throughout the exam and was allowed to stay.    Hypertension       History of Present Illness  The patient is a 69-year-old female here today for follow-up on hypertension, overweight with a body mass index of 25, recurrent major depressive disorder in remission, need for COVID-19 vaccine, encounter for screening mammogram, postmenopausal status, left arm pain, and post-encounter for screening colonoscopy.    She experienced an increase in blood pressure and left arm pain while waiting for her colonoscopy, which led to her admission to the ER. Several cardiac tests, including a stress echo, were performed, along with x-rays of her mouth, arm, ribs, and back. No abnormalities were found except for her high blood pressure. She is currently on atenolol and clonidine for blood pressure management, with atenolol taken in the morning and clonidine taken at 3:00 PM and before bed. Her blood pressure readings at home typically range from 128 to 132 systolic and 86 to 94 diastolic. She has confirmed the accuracy of her home blood pressure cuff with a pharmacy's cuff.    She reports experiencing depression due to her daughters' cancer diagnoses and is currently taking hydroxyzine for anxiety.    She had a bout of vomiting two nights ago, which lasted all night but has since improved after taking three doses of Elmitrol. She did not experience any diarrhea or fever during this episode.    She also reports a sensation of blockage in her ear.    IMMUNIZATIONS  She got her Prevnar 20 on 10/05/2023.    Vitals:    10/24/24 0844 10/24/24 0851   BP: 153/84 148/85   BP  "Location: Right arm Left arm   Patient Position: Sitting Sitting   Cuff Size: Adult Adult   Pulse: 75    Temp: 98.7 °F (37.1 °C)    TempSrc: Tympanic    SpO2: 97%    Weight: 59.4 kg (131 lb)    Height: 152.4 cm (60\")      Body mass index is 25.58 kg/m².    Current Outpatient Medications on File Prior to Visit   Medication Sig Dispense Refill    albuterol (ACCUNEB) 0.63 MG/3ML nebulizer solution Take 3 mL by nebulization Every Night. 100 each 3    albuterol sulfate  (90 Base) MCG/ACT inhaler Inhale 2 puffs Every 4 (Four) Hours As Needed for Wheezing. 54 g 3    aspirin 325 MG tablet Take 1 tablet by mouth Daily.      atenolol-chlorthalidone (TENORETIC) 50-25 MG per tablet TAKE 1 TABLET BY MOUTH DAILY 90 tablet 1    B-COMPLEX-C PO Take  by mouth.      cloNIDine (CATAPRES) 0.2 MG tablet TAKE 1 TABLET BY MOUTH TWICE DAILY 180 tablet 1    cloNIDine (CATAPRES) 0.2 MG tablet Take 1 tablet by mouth 2 (Two) Times a Day.      cyclobenzaprine (FLEXERIL) 5 MG tablet Take 1 tablet by mouth 3 (Three) Times a Day As Needed for Muscle Spasms. 90 tablet 1    D3 Maximum Strength 125 MCG (5000 UT) capsule capsule TAKE 1 CAPSULE BY MOUTH DAILY 90 capsule 1    esomeprazole (nexIUM) 20 MG capsule Take 1 capsule by mouth Every Morning Before Breakfast. 90 capsule 3    famotidine (PEPCID) 20 MG tablet TAKE 1 TABLET BY MOUTH AT NIGHT AS NEEDED FOR HEARTBURN 90 tablet 1    hydrOXYzine (ATARAX) 25 MG tablet TAKE 1 TABLET BY MOUTH THREE TIMES DAILY AS NEEDED FOR ITCHING 90 tablet 1    montelukast (SINGULAIR) 10 MG tablet TAKE 1 TABLET BY MOUTH EVERY NIGHT 90 tablet 3    Repatha solution prefilled syringe injection INJECT 1 MILLILITER UNDER THE SKIN AS DIRECTED EVERY 14 DAYS 6 mL 3     No current facility-administered medications on file prior to visit.       The following portions of the patient's history were reviewed and updated as appropriate: allergies, current medications, past family history, past medical history, past social " history, past surgical history, and problem list.    Review of Systems   Gastrointestinal:  Positive for abdominal pain, nausea and vomiting. Negative for diarrhea.   Musculoskeletal:  Negative for arthralgias and myalgias.   Psychiatric/Behavioral:  Negative for dysphoric mood and depressed mood.        Objective   Physical Exam  Vitals reviewed.   Constitutional:       General: She is not in acute distress.     Appearance: Normal appearance. She is well-developed. She is not ill-appearing or toxic-appearing.   HENT:      Head: Normocephalic and atraumatic.      Right Ear: Tympanic membrane, ear canal and external ear normal.      Left Ear: Tympanic membrane, ear canal and external ear normal.      Nose: Nose normal.      Mouth/Throat:      Mouth: Mucous membranes are moist.      Pharynx: No posterior oropharyngeal erythema.   Eyes:      Extraocular Movements: Extraocular movements intact.      Conjunctiva/sclera: Conjunctivae normal.      Pupils: Pupils are equal, round, and reactive to light.   Neck:      Vascular: No carotid bruit.   Cardiovascular:      Rate and Rhythm: Normal rate and regular rhythm.      Heart sounds: Normal heart sounds.   Pulmonary:      Effort: Pulmonary effort is normal.      Breath sounds: Normal breath sounds.   Abdominal:      General: Abdomen is flat. Bowel sounds are normal. There is no distension.      Palpations: Abdomen is soft. There is no mass.      Tenderness: There is no abdominal tenderness. There is no right CVA tenderness, left CVA tenderness or guarding.   Musculoskeletal:         General: Normal range of motion.      Cervical back: Neck supple. No tenderness.   Lymphadenopathy:      Cervical: No cervical adenopathy.   Skin:     General: Skin is warm.   Neurological:      General: No focal deficit present.      Mental Status: She is alert and oriented to person, place, and time.   Psychiatric:         Mood and Affect: Mood normal.         Behavior: Behavior normal.        Physical Exam  Eardrum is fully visible with no wax or signs of infection. Throat appears healthy. Tongue is moist.  Carotid arteries are normal.  Lungs are clear.  Heart exhibits normal rate and rhythm.  Abdomen is quiet with audible bowel sounds.    PHQ-9 Total Score:    Results           Assessment & Plan   Diagnoses and all orders for this visit:    1. Primary hypertension (Primary)  -     CBC Auto Differential; Future  -     Comprehensive Metabolic Panel; Future    2. Need for COVID-19 vaccine    3. Encounter for screening mammogram for malignant neoplasm of breast  -     Mammo Screening Digital Tomosynthesis Bilateral With CAD; Future    4. Postmenopause  -     DEXA Bone Density Axial; Future    5. Left arm pain  -     Magnesium; Future    6. Encounter for screening colonoscopy    7. Overweight with body mass index (BMI) of 25 to 25.9 in adult    8. Recurrent major depressive disorder, in remission    9. Nausea and vomiting, unspecified vomiting type    Other orders  -     COVID-19 (Pfizer) 12yrs+ (COMIRNATY)  -     Fluzone High-Dose 65+yrs (1196-1710)  -     fluticasone (FLONASE) 50 MCG/ACT nasal spray; Administer 2 sprays into the nostril(s) as directed by provider Daily.  Dispense: 1 g; Refill: 0      Assessment & Plan  1. Hypertension.  Her blood pressure readings were slightly elevated during this visit. She is currently taking atenolol and clonidine. She has adjusted her medication schedule to take atenolol in the morning and clonidine in the afternoon and before bed. She is advised to take an additional dose of clonidine if her blood pressure rises. She is instructed to monitor her blood pressure at home and send in readings over the next few weeks. A recheck of her CBC, potassium, kidney and liver function tests, and magnesium levels will be done today. She is encouraged to maintain adequate hydration.    2. Overweight.  Her body mass index is 25. She is advised to continue monitoring her weight  and maintain a healthy diet and exercise routine.    3. Recurrent Major Depressive Disorder in Remission.  She reports increased anxiety due to her daughters' cancer diagnoses. She is currently taking hydroxyzine for anxiety. She is instructed to report any worsening of her anxiety or depression.    4. Left Arm Pain.  She experienced left arm pain during her recent hospital visit, but it has since resolved. X-rays were taken, and no significant findings were reported.    5. Nausea and Vomiting.  She reported episodes of nausea and vomiting but has since improved. She is instructed to report any recurrence of symptoms.    6. Ear Congestion.  She reports a sensation of ear congestion. An ear spray will be prescribed to be used once daily. She is instructed not to use it more frequently than recommended.    7. Health Maintenance.  She is due for her regular influenza and COVID-19 vaccines, which will be administered today. She is also due for a screening mammogram and a DEXA scan, which will be scheduled. Her colonoscopy revealed a polyp, and a repeat procedure is recommended in 3 years.        Patient Instructions     Health Maintenance Due   Topic Date Due    MAMMOGRAM  Never done    DXA SCAN  Never done    ZOSTER VACCINE (1 of 2) Never done    INFLUENZA VACCINE  08/01/2024    COVID-19 Vaccine (4 - 2023-24 season) 09/01/2024    You are due for Shingrix vaccination series ( the newest shingles vaccine).  It is a two shot series spaced 2-6 months apart. Please get this vaccine series started at your earliest convenience at your local pharmacy to help avoid shingles outbreak. It is more effective than the old Zostavax vaccine and is recommended even if you have had the Zostavax vaccine in the past.  Once the Shingrix series is completed, it does not need to be repeated.   For more information, please look at the website below:  https://www.cdc.gov/vaccines/vpd/shingles/public/shingrix/index.html    Check blood pressure  cuff for accuracy and send 10 blood pressures over 2 weeks.  Watch sodium, alcohol and weight     Increase clear fluids and call if heart irregularities return or can't stop vomiting         Patient or patient representative verbalized consent for the use of Ambient Listening during the visit with  Zoe Mohr MD for chart documentation. 10/24/2024  17:45 EDT

## 2024-10-25 ENCOUNTER — TELEPHONE (OUTPATIENT)
Dept: FAMILY MEDICINE CLINIC | Facility: CLINIC | Age: 69
End: 2024-10-25
Payer: MEDICARE

## 2024-10-25 DIAGNOSIS — I10 PRIMARY HYPERTENSION: Primary | ICD-10-CM

## 2024-10-25 RX ORDER — POTASSIUM CHLORIDE 1500 MG/1
20 TABLET, EXTENDED RELEASE ORAL 2 TIMES DAILY
Qty: 60 TABLET | Refills: 1 | Status: SHIPPED | OUTPATIENT
Start: 2024-10-25

## 2024-10-25 NOTE — LETTER
"  October 28, 2024      Priscilla Fragoso    [address]  [place] [address]        Dear Ms. Fragoso    Below are the results from your recent visit:      \"Liver functions are elevated so avoid ibuprofen Aleve Motrin and Tylenol and if you drink alcohol I would drink very little.  We should repeat the liver functions in 3 weeks and if they are still elevated we will have to do additional testing.  Potassium is down to 2.8 so I am going to send potassium 20 mEq I want you to take it twice a day for 2 weeks and then once a day till recheck that lab in 3 weeks as well.  Calcium is low so I would consider eating foods that are rich in calcium and vitamin C to see if we can boost your calcium level back to the normal level I see that you are taking D3 but no calcium you can add some calcium to your diet but once we get your calcium level up to normal we should stop giving you supplement and have you take it only in your food.  Glucose was 108 but I do not believe this was a fasting test.  Finally kidney function has decreased from 88-48 so avoid x-ray dyes and we will be rechecking this as well we may need to consider stopping the Nexium and changing your diuretic if this does not improve in 3 weeks.  Call if any other questions or concerns\"           Sincerely,      Zoe Mohr MD  " [facility]   Electrophysiology Progress Note     Date: 1/3/2024  Admit Date: 12/30/2023     Reason for consultation: AF,     Chief Complaint:   Chief Complaint   Patient presents with    Leg Pain     Pt arrived to ED via [facility] ems from home with c/o left sided leg pain. Per pt, she was cutting up greens for her [employer] celebration when suddenly she began having sharp pain that radiates from her thigh to her shin. Per pt she has never experienced pain like this before. Pt takes warfarin for afib. Hx of afib, htn, arthritis.        History of Present Illness: History obtained from patient and medical record.     Emilee Paulson is a 70 y.o. female with a past medical history of permanent atrial fibrillation, ischemic cardiomyopathy, CHF, hypertension, hyperlipidemia, history of falls, cerebral vascular disease, thyroid nodule, hemorrhagic stroke, and dilated ascending aorta.    Pt presents from home with spontaneous hemorrhagic knee effusion.  According to patient she was in her usual state of health until the day before yesterday when she was washing some greens and sudden had severe left knee pain with effusion.  She denies trauma. Pt found to be covid positive. Patient previous had declined Watchman however is open to discussing watchman in future.    Interval Hx: Today, she is being seen for follow up. She remains in covid isolation. Pt continues in rate controlled atrial fibrillation. Her bP is elevated. I again discussed ODELL closure/Watchman with her and her son (over the phone).     Patient seen and examined. Clinical notes reviewed. Telemetry reviewed.  No new complaints today. No major events overnight.   Denies having chest pain, palpitations, shortness of breath, orthopnea/PND, cough, or dizziness at the time of this visit.    Allergies:  Allergies   Allergen Reactions    Clonidine Rash, Shortness Of Breath and Hives    Codeine Hives, Itching, Nausea Only and Rash     Other reaction(s):  Coronary artery disease due to lipid rich plaque 10/26/2015    Permanent atrial fibrillation (HCC) 02/13/2015        Assessment and Plan:     1.Permanent Atrial Fibrillation  - Currently in AF, rate controlled  - Continue coreg 12.5 mg BID  - [id number] score:high; [id number] Vasc score and anticoagulation discussed. High risk for stroke and thromboembolism. Anticoagulation is recommended. Risk of bleeding was discussed.  ~ Resume coumadin when able due to stroke risk when ok with consulting teams if no plans for procedures    - I discussed left atrial appendage closure with watchman device. Alternatives including surgical ligation of ODELL also discussed. I explained to the patient that most ischemic stroke in patient with atrial fibrillation are due to a thrombus/clot in the left atrial appendage. However, some patients do have a stroke with different reasons including hemorrhage. Watchman device only protects against the stroke associated with left atrial appendage related clots/thrombus. The procedure has been discussed in detail with patient and family members along with the risk and benefits.     ------> Patient and her son want to discuss it further. If they want to proceed, will arrange 2nd opinion visit with Dr. Avelar    2.HTN  - Mildly uncontrolled this AM, but was stable yesterday  ~ Goal <130/80  - Continue current medications for now. If remains elevated as outpatient, we can adjust her meds further    3. Acute Anemia, Spontaneous Hematoma   - Stable; 10.4/31   - Resume AC prior to d/c if no plans for procedure    4.Chronic diastolic heart failure (NYHA Class II)  - Appears compensated   ~ EF 60% per echo  - Home meds on hold. Resume at d/c  - Aggressive medical therapy with risk factor modification  - Discussed with patient importance of monitoring weight, low sodium diet and fluid restriction    5. Hx of CVA   - Continue ASA, statin    No further EP recommendations. EP will sign off. Follow up in a few

## 2024-10-25 NOTE — PROGRESS NOTES
Liver functions are elevated so avoid ibuprofen Aleve Motrin and Tylenol and if you drink alcohol I would drink very little.  We should repeat the liver functions in 3 weeks and if they are still elevated we will have to do additional testing.  Potassium is down to 2.8 so I am going to send potassium 20 mEq I want you to take it twice a day for 2 weeks and then once a day till recheck that lab in 3 weeks as well.  Calcium is low so I would consider eating foods that are rich in calcium and vitamin C to see if we can boost your calcium level back to the normal level I see that you are taking D3 but no calcium you can add some calcium to your diet but once we get your calcium level up to normal we should stop giving you supplement and have you take it only in your food.  Glucose was 108 but I do not believe this was a fasting test.  Finally kidney function has decreased from 88-48 so avoid x-ray dyes and we will be rechecking this as well we may need to consider stopping the Nexium and changing your diuretic if this does not improve in 3 weeks.  Call if any other questions or concerns

## 2024-10-25 NOTE — TELEPHONE ENCOUNTER
"HUB TO RELAY    \"Liver functions are elevated so avoid ibuprofen Aleve Motrin and Tylenol and if you drink alcohol I would drink very little.  We should repeat the liver functions in 3 weeks and if they are still elevated we will have to do additional testing.  Potassium is down to 2.8 so I am going to send potassium 20 mEq I want you to take it twice a day for 2 weeks and then once a day till recheck that lab in 3 weeks as well.  Calcium is low so I would consider eating foods that are rich in calcium and vitamin C to see if we can boost your calcium level back to the normal level I see that you are taking D3 but no calcium you can add some calcium to your diet but once we get your calcium level up to normal we should stop giving you supplement and have you take it only in your food.  Glucose was 108 but I do not believe this was a fasting test.  Finally kidney function has decreased from 88-48 so avoid x-ray dyes and we will be rechecking this as well we may need to consider stopping the Nexium and changing your diuretic if this does not improve in 3 weeks.  Call if any other questions or concerns\"  "

## 2024-10-30 NOTE — TELEPHONE ENCOUNTER
Name: Priscilla Fragoso    Relationship: Self    Best Callback Number: [phone]     HUB PROVIDED THE RELAY MESSAGE FROM THE OFFICE. PATIENT VOICED UNDERSTANDING AND HAS NO FURTHER QUESTIONS AT THIS TIME.

## 2024-10-31 ENCOUNTER — LAB (OUTPATIENT)
Dept: FAMILY MEDICINE CLINIC | Facility: CLINIC | Age: 69
End: 2024-10-31
Payer: MEDICARE

## 2024-10-31 DIAGNOSIS — K21.9 GASTROESOPHAGEAL REFLUX DISEASE WITHOUT ESOPHAGITIS: ICD-10-CM

## 2024-10-31 DIAGNOSIS — I10 PRIMARY HYPERTENSION: ICD-10-CM

## 2024-10-31 PROCEDURE — 36415 COLL VENOUS BLD VENIPUNCTURE: CPT

## 2024-10-31 PROCEDURE — 80053 COMPREHEN METABOLIC PANEL: CPT | Performed by: PREVENTIVE MEDICINE

## 2024-10-31 RX ORDER — FAMOTIDINE 20 MG/1
20 TABLET, FILM COATED ORAL NIGHTLY PRN
Qty: 90 TABLET | Refills: 1 | OUTPATIENT
Start: 2024-10-31

## 2024-10-31 RX ORDER — ATENOLOL AND CHLORTHALIDONE TABLET 50; 25 MG/1; MG/1
1 TABLET ORAL DAILY
Qty: 90 TABLET | Refills: 1 | OUTPATIENT
Start: 2024-10-31

## 2024-10-31 RX ORDER — CLONIDINE HYDROCHLORIDE 0.1 MG/1
0.1 TABLET ORAL 2 TIMES DAILY
Qty: 180 TABLET | Refills: 1 | OUTPATIENT
Start: 2024-10-31

## 2024-11-01 ENCOUNTER — TELEPHONE (OUTPATIENT)
Dept: FAMILY MEDICINE CLINIC | Facility: CLINIC | Age: 69
End: 2024-11-01
Payer: MEDICARE

## 2024-11-01 DIAGNOSIS — R74.01 ELEVATED TRANSAMINASE LEVEL: Primary | ICD-10-CM

## 2024-11-01 DIAGNOSIS — F41.9 ANXIETY: ICD-10-CM

## 2024-11-01 LAB
ALBUMIN SERPL-MCNC: 2.6 G/DL (ref 3.5–5.2)
ALBUMIN/GLOB SERPL: 0.5 G/DL
ALP SERPL-CCNC: 164 U/L (ref 39–117)
ALT SERPL W P-5'-P-CCNC: 28 U/L (ref 1–33)
ANION GAP SERPL CALCULATED.3IONS-SCNC: 10.5 MMOL/L (ref 5–15)
AST SERPL-CCNC: 70 U/L (ref 1–32)
BILIRUB SERPL-MCNC: 1.4 MG/DL (ref 0–1.2)
BUN SERPL-MCNC: 6 MG/DL (ref 8–23)
BUN/CREAT SERPL: 6.7 (ref 7–25)
CALCIUM SPEC-SCNC: 8.6 MG/DL (ref 8.6–10.5)
CHLORIDE SERPL-SCNC: 105 MMOL/L (ref 98–107)
CO2 SERPL-SCNC: 26.5 MMOL/L (ref 22–29)
CREAT SERPL-MCNC: 0.89 MG/DL (ref 0.57–1)
EGFRCR SERPLBLD CKD-EPI 2021: 69.4 ML/MIN/1.73
GLOBULIN UR ELPH-MCNC: 5.3 GM/DL
GLUCOSE SERPL-MCNC: 100 MG/DL (ref 65–99)
POTASSIUM SERPL-SCNC: 3.9 MMOL/L (ref 3.5–5.2)
PROT SERPL-MCNC: 7.9 G/DL (ref 6–8.5)
SODIUM SERPL-SCNC: 142 MMOL/L (ref 136–145)

## 2024-11-01 RX ORDER — HYDROXYZINE HYDROCHLORIDE 25 MG/1
25 TABLET, FILM COATED ORAL 3 TIMES DAILY PRN
Qty: 90 TABLET | Refills: 1 | OUTPATIENT
Start: 2024-11-01

## 2024-11-01 NOTE — PROGRESS NOTES
Potassium is now in the normal range so you could cut the potassium back to 1 time per day.  Continue to repeat the liver functions again in 2 weeks as advised from lab drawing before this.  2 of the functions have improved and 1 has decreased.  Make sure that you do repeat the labs again in 2 weeks and we will do further workup on your liver if persists.

## 2024-11-01 NOTE — TELEPHONE ENCOUNTER
"HUB TO RELAY    \"Potassium is now in the normal range so you could cut the potassium back to 1 time per day.  Continue to repeat the liver functions again in 2 weeks as advised from lab drawing before this.  2 of the functions have improved and 1 has decreased.  Make sure that you do repeat the labs again in 2 weeks and we will do further workup on your liver if persists.\"  "

## 2024-11-01 NOTE — LETTER
"  November 1, 2024    Priscilla Fragoso  [address] [place] [address]        HUB TO RELAY     \"Potassium is now in the normal range so you could cut the potassium back to 1 time per day.  Continue to repeat the liver functions again in 2 weeks as advised from lab drawing before this.  2 of the functions have improved and 1 has decreased.  Make sure that you do repeat the labs again in 2 weeks and we will do further workup on your liver if persists.\"                           Zoe Mohr MD  "

## 2024-11-06 ENCOUNTER — TELEPHONE (OUTPATIENT)
Dept: FAMILY MEDICINE CLINIC | Facility: CLINIC | Age: 69
End: 2024-11-06
Payer: MEDICARE

## 2024-11-06 DIAGNOSIS — I10 PRIMARY HYPERTENSION: ICD-10-CM

## 2024-11-06 DIAGNOSIS — F41.9 ANXIETY: ICD-10-CM

## 2024-11-06 DIAGNOSIS — K21.9 GASTROESOPHAGEAL REFLUX DISEASE WITHOUT ESOPHAGITIS: ICD-10-CM

## 2024-11-06 RX ORDER — HYDROXYZINE HYDROCHLORIDE 25 MG/1
25 TABLET, FILM COATED ORAL 3 TIMES DAILY PRN
Qty: 90 TABLET | Refills: 1 | OUTPATIENT
Start: 2024-11-06

## 2024-11-06 RX ORDER — FAMOTIDINE 20 MG/1
20 TABLET, FILM COATED ORAL NIGHTLY PRN
Qty: 90 TABLET | Refills: 1 | OUTPATIENT
Start: 2024-11-06

## 2024-11-06 RX ORDER — ATENOLOL AND CHLORTHALIDONE TABLET 50; 25 MG/1; MG/1
1 TABLET ORAL DAILY
Qty: 90 TABLET | Refills: 1 | OUTPATIENT
Start: 2024-11-06

## 2024-11-06 NOTE — TELEPHONE ENCOUNTER
Caller: Priscilla Fragoso    Relationship: Self    Best call back number: [phone]       Who are you requesting to speak with (clinical staff, provider,  specific staff member): CLINICAL OR PCP      What was the call regarding: PATIENT CALLING IN REGARDING COLOGUARD TEST. SHE HAS COMPLETED. WANTS TO KNOW IF SHE MAILS IT BACK AT POST OFFICE OR BRINGS TO OFFICE. PLEASE CALL BACK TO ADVISE

## 2024-11-06 NOTE — TELEPHONE ENCOUNTER
Priscilla Fragoso notified of how to return her Cologuard and voiced comprehension and understanding.

## 2024-11-09 ENCOUNTER — APPOINTMENT (OUTPATIENT)
Dept: GENERAL RADIOLOGY | Facility: HOSPITAL | Age: 69
End: 2024-11-09
Payer: MEDICARE

## 2024-11-09 ENCOUNTER — APPOINTMENT (OUTPATIENT)
Dept: CT IMAGING | Facility: HOSPITAL | Age: 69
End: 2024-11-09
Payer: MEDICARE

## 2024-11-09 ENCOUNTER — HOSPITAL ENCOUNTER (EMERGENCY)
Facility: HOSPITAL | Age: 69
Discharge: HOME | End: 2024-11-09
Payer: MEDICARE

## 2024-11-09 VITALS
RESPIRATION RATE: 18 BRPM | BODY MASS INDEX: 27.92 KG/M2 | OXYGEN SATURATION: 95 % | WEIGHT: 142.2 LBS | DIASTOLIC BLOOD PRESSURE: 91 MMHG | SYSTOLIC BLOOD PRESSURE: 152 MMHG | TEMPERATURE: 98 F | HEART RATE: 77 BPM | HEIGHT: 60 IN

## 2024-11-09 DIAGNOSIS — R07.9 CHEST PAIN, UNSPECIFIED TYPE: Primary | ICD-10-CM

## 2024-11-09 LAB
ALBUMIN SERPL-MCNC: 2.7 G/DL (ref 3.5–5.2)
ALBUMIN/GLOB SERPL: 0.6 G/DL
ALP SERPL-CCNC: 124 U/L (ref 39–117)
ALT SERPL W P-5'-P-CCNC: 33 U/L (ref 1–33)
ANION GAP SERPL CALCULATED.3IONS-SCNC: 12.9 MMOL/L (ref 5–15)
AST SERPL-CCNC: 117 U/L (ref 1–32)
BASOPHILS # BLD AUTO: 0.14 10*3/MM3 (ref 0–0.2)
BASOPHILS NFR BLD AUTO: 1.2 % (ref 0–1.5)
BILIRUB SERPL-MCNC: 2.1 MG/DL (ref 0–1.2)
BUN SERPL-MCNC: 5 MG/DL (ref 8–23)
BUN/CREAT SERPL: 7.1 (ref 7–25)
CALCIUM SPEC-SCNC: 8 MG/DL (ref 8.6–10.5)
CHLORIDE SERPL-SCNC: 108 MMOL/L (ref 98–107)
CO2 SERPL-SCNC: 22.1 MMOL/L (ref 22–29)
CREAT SERPL-MCNC: 0.7 MG/DL (ref 0.57–1)
D DIMER PPP FEU-MCNC: 7.38 MCGFEU/ML (ref 0–0.71)
DEPRECATED RDW RBC AUTO: 57.5 FL (ref 37–54)
EGFRCR SERPLBLD CKD-EPI 2021: 92.6 ML/MIN/1.73
EOSINOPHIL # BLD AUTO: 0.15 10*3/MM3 (ref 0–0.4)
EOSINOPHIL NFR BLD AUTO: 1.3 % (ref 0.3–6.2)
ERYTHROCYTE [DISTWIDTH] IN BLOOD BY AUTOMATED COUNT: 16.6 % (ref 12.3–15.4)
GLOBULIN UR ELPH-MCNC: 4.8 GM/DL
GLUCOSE SERPL-MCNC: 136 MG/DL (ref 65–99)
HCT VFR BLD AUTO: 32 % (ref 34–46.6)
HGB BLD-MCNC: 11 G/DL (ref 12–15.9)
HOLD SPECIMEN: NORMAL
HOLD SPECIMEN: NORMAL
IMM GRANULOCYTES # BLD AUTO: 0.04 10*3/MM3 (ref 0–0.05)
IMM GRANULOCYTES NFR BLD AUTO: 0.3 % (ref 0–0.5)
LYMPHOCYTES # BLD AUTO: 2.52 10*3/MM3 (ref 0.7–3.1)
LYMPHOCYTES NFR BLD AUTO: 21.7 % (ref 19.6–45.3)
MCH RBC QN AUTO: 32.7 PG (ref 26.6–33)
MCHC RBC AUTO-ENTMCNC: 34.4 G/DL (ref 31.5–35.7)
MCV RBC AUTO: 95.2 FL (ref 79–97)
MONOCYTES # BLD AUTO: 1.18 10*3/MM3 (ref 0.1–0.9)
MONOCYTES NFR BLD AUTO: 10.2 % (ref 5–12)
NEUTROPHILS NFR BLD AUTO: 65.3 % (ref 42.7–76)
NEUTROPHILS NFR BLD AUTO: 7.57 10*3/MM3 (ref 1.7–7)
NRBC BLD AUTO-RTO: 0 /100 WBC (ref 0–0.2)
NT-PROBNP SERPL-MCNC: 715 PG/ML (ref 0–900)
PLATELET # BLD AUTO: 145 10*3/MM3 (ref 140–450)
PMV BLD AUTO: 12.1 FL (ref 6–12)
POTASSIUM SERPL-SCNC: 3.4 MMOL/L (ref 3.5–5.2)
PROT SERPL-MCNC: 7.5 G/DL (ref 6–8.5)
RBC # BLD AUTO: 3.36 10*6/MM3 (ref 3.77–5.28)
SODIUM SERPL-SCNC: 143 MMOL/L (ref 136–145)
TROPONIN T SERPL HS-MCNC: 6 NG/L
WBC NRBC COR # BLD AUTO: 11.6 10*3/MM3 (ref 3.4–10.8)
WHOLE BLOOD HOLD COAG: NORMAL
WHOLE BLOOD HOLD SPECIMEN: NORMAL

## 2024-11-09 PROCEDURE — 93005 ELECTROCARDIOGRAM TRACING: CPT

## 2024-11-09 PROCEDURE — 84484 ASSAY OF TROPONIN QUANT: CPT

## 2024-11-09 PROCEDURE — 25510000001 IOPAMIDOL PER 1 ML: Performed by: PHYSICIAN ASSISTANT

## 2024-11-09 PROCEDURE — 80053 COMPREHEN METABOLIC PANEL: CPT

## 2024-11-09 PROCEDURE — 99285 EMERGENCY DEPT VISIT HI MDM: CPT

## 2024-11-09 PROCEDURE — 71275 CT ANGIOGRAPHY CHEST: CPT

## 2024-11-09 PROCEDURE — 85379 FIBRIN DEGRADATION QUANT: CPT | Performed by: PHYSICIAN ASSISTANT

## 2024-11-09 PROCEDURE — 71045 X-RAY EXAM CHEST 1 VIEW: CPT

## 2024-11-09 PROCEDURE — 85025 COMPLETE CBC W/AUTO DIFF WBC: CPT

## 2024-11-09 PROCEDURE — 83880 ASSAY OF NATRIURETIC PEPTIDE: CPT | Performed by: PHYSICIAN ASSISTANT

## 2024-11-09 RX ORDER — SODIUM CHLORIDE 0.9 % (FLUSH) 0.9 %
10 SYRINGE (ML) INJECTION AS NEEDED
Status: DISCONTINUED | OUTPATIENT
Start: 2024-11-09 | End: 2024-11-09 | Stop reason: HOSPADM

## 2024-11-09 RX ORDER — ASPIRIN 81 MG/1
324 TABLET, CHEWABLE ORAL ONCE
Status: COMPLETED | OUTPATIENT
Start: 2024-11-09 | End: 2024-11-09

## 2024-11-09 RX ORDER — IOPAMIDOL 755 MG/ML
100 INJECTION, SOLUTION INTRAVASCULAR
Status: COMPLETED | OUTPATIENT
Start: 2024-11-09 | End: 2024-11-09

## 2024-11-09 RX ADMIN — IOPAMIDOL 100 ML: 755 INJECTION, SOLUTION INTRAVENOUS at 17:06

## 2024-11-09 RX ADMIN — ASPIRIN 81 MG CHEWABLE TABLET 324 MG: 81 TABLET CHEWABLE at 16:01

## 2024-11-09 NOTE — ED PROVIDER NOTES
Subjective   History of Present Illness  Patient is a 69-year-old female presents to the ED with reports of left-sided chest pain that started around 6 AM this morning.  She describes as a constant sharp type pain that radiates down her left arm and leg nighttime.  Patient's pain is reproducible with palpation.  She reports the pain is worse with deep breaths.  She denies any new cough, congestion, edema, lightheadedness, dizziness, or syncopal episodes.  She denies any blood thinner use.  Patient states she was recently hospitalized for cardiac workup but states this chest pain is different than when she was seen here in October.  No recent travel did have a recent colonoscopy.        Review of Systems   Constitutional: Negative.    HENT:  Positive for rhinorrhea. Negative for congestion, ear discharge, ear pain and sore throat.    Eyes:  Negative for photophobia and visual disturbance.   Respiratory:  Positive for shortness of breath. Negative for apnea, cough, choking, chest tightness, wheezing and stridor.    Cardiovascular:  Positive for chest pain. Negative for palpitations and leg swelling.   Gastrointestinal:  Negative for abdominal pain, nausea and vomiting.   Genitourinary:  Negative for difficulty urinating, dysuria, flank pain and frequency.   Musculoskeletal:  Negative for back pain, neck pain and neck stiffness.   Skin: Negative.    Neurological:  Negative for weakness and numbness.       Past Medical History:   Diagnosis Date    Anesthesia complication     stated was given too much    Asthma     GERD (gastroesophageal reflux disease)     GI bleed     Headache     Hyperlipidemia     Hypertension     Peptic ulceration     PONV (postoperative nausea and vomiting)        Allergies   Allergen Reactions    Codeine Anaphylaxis    Penicillins Swelling    Sulfa Antibiotics Rash    Tetanus Toxoids Rash       Past Surgical History:   Procedure Laterality Date    COLONOSCOPY N/A 10/17/2024    Procedure:  COLONOSCOPY WITH POLYPECTOMY X 1, BIOPSY X 1;  Surgeon: Joshua Lindsay MD;  Location: [facility] ENDOSCOPY;  Service: General;  Laterality: N/A;  POLYP,    EYE SURGERY      TUBAL ABDOMINAL LIGATION         No family history on file.    Social History     Socioeconomic History    Marital status:    Tobacco Use    Smoking status: Former     Current packs/day: 0.00     Average packs/day: 3.0 packs/day for 15.1 years (45.4 ttl pk-yrs)     Types: Cigarettes     Start date:      Quit date: 1994     Years since quittin.7     Passive exposure: Past    Smokeless tobacco: Never   Vaping Use    Vaping status: Never Used   Substance and Sexual Activity    Alcohol use: Not Currently    Drug use: Never    Sexual activity: Defer           Objective   Physical Exam  Vitals and nursing note reviewed.   Constitutional:       General: She is not in acute distress.     Appearance: She is well-developed. She is not ill-appearing, toxic-appearing or diaphoretic.   HENT:      Head: Normocephalic and atraumatic.      Mouth/Throat:      Mouth: Mucous membranes are moist.      Pharynx: Oropharynx is clear.   Eyes:      General: No scleral icterus.     Extraocular Movements: Extraocular movements intact.      Pupils: Pupils are equal, round, and reactive to light.   Cardiovascular:      Rate and Rhythm: Normal rate and regular rhythm.      Heart sounds: No murmur heard.     No friction rub. No gallop.   Pulmonary:      Effort: Pulmonary effort is normal. No respiratory distress.      Breath sounds: Normal breath sounds. No stridor. No wheezing, rhonchi or rales.   Chest:      Chest wall: No tenderness.   Abdominal:      General: Bowel sounds are normal. There is no distension. There are no signs of injury.      Palpations: Abdomen is soft. There is no fluid wave, hepatomegaly, splenomegaly or mass.      Tenderness: There is no abdominal tenderness. There is no right CVA tenderness, left CVA tenderness, guarding or rebound.       Hernia: No hernia is present.   Skin:     General: Skin is warm.      Capillary Refill: Capillary refill takes less than 2 seconds.      Coloration: Skin is not cyanotic, jaundiced or pale.      Findings: No rash.   Neurological:      General: No focal deficit present.      Mental Status: She is alert and oriented to person, place, and time.   Psychiatric:         Mood and Affect: Mood normal.         Behavior: Behavior normal.         Procedures           ED Course  ED Course as of 11/09/24 1750   Sat Nov 09, 2024   1713 Patient care transferred to GARETH Headley pending CT and disposition [AA]   1721 Care assumed from IRAIDA Wood  [MD]   1746 CT PE protocol shows no evidence of PE, trace right pleural effusion. Opacity in the lateral basilar left lower lobe likely represents atelectasis, less likely pneumonic infiltrate or nodule. Recommend follow-up in 2 to 3 months  5. Diffusely heterogeneous liver which may relate to diffuse hepatocellular disease with patchy steatosis. It is difficult to exclude the presence of multiple liver masses based on bolus timing for this exam. Recommend contrast-enhanced CT of the abdomen   when appropriate to rule out focal liver lesions  6. Ascites  7. Coronary artery atherosclerosis   [MD]   4774 Findings were discussed with the patient.  On my examination she is resting quietly and in no distress.  She has no complaints at this time.  She states her pain is resolved.  It is though reproducible when she raises her left arm or moves suggesting a musculoskeletal etiology.  She has a grossly benign ED workup otherwise.  Chart review also shows she also recently underwent stress testing 3 weeks ago that was unremarkable.    Patient be discharged home and instructed follow-up with her primary care provider.  She was given warning signs for prompt return to ED voiced understanding. [MD]      ED Course User Index  [AA] Israel Duran PA  [MD] Sandra Celestin, APRN    /91   Pulse  "77   Temp 98.1 °F (36.7 °C) (Oral)   Resp 18   Ht 152.4 cm (60\")   Wt 64.5 kg (142 lb 3.2 oz)   LMP  (LMP Unknown)   SpO2 95%   BMI 27.77 kg/m²   Medications   sodium chloride 0.9 % flush 10 mL (has no administration in time range)   aspirin chewable tablet 324 mg (324 mg Oral Given 11/9/24 1601)   iopamidol (ISOVUE-370) 76 % injection 100 mL (100 mL Intravenous Given 11/9/24 1706)     Labs Reviewed   COMPREHENSIVE METABOLIC PANEL - Abnormal; Notable for the following components:       Result Value    Glucose 136 (*)     BUN 5 (*)     Potassium 3.4 (*)     Chloride 108 (*)     Calcium 8.0 (*)     Albumin 2.7 (*)     AST (SGOT) 117 (*)     Alkaline Phosphatase 124 (*)     Total Bilirubin 2.1 (*)     All other components within normal limits    Narrative:     GFR Normal >60  Chronic Kidney Disease <60  Kidney Failure <15    The GFR formula is only valid for adults with stable renal function between ages 18 and 70.   CBC WITH AUTO DIFFERENTIAL - Abnormal; Notable for the following components:    WBC 11.60 (*)     RBC 3.36 (*)     Hemoglobin 11.0 (*)     Hematocrit 32.0 (*)     RDW 16.6 (*)     RDW-SD 57.5 (*)     MPV 12.1 (*)     Neutrophils, Absolute 7.57 (*)     Monocytes, Absolute 1.18 (*)     All other components within normal limits   D-DIMER, QUANTITATIVE - Abnormal; Notable for the following components:    D-Dimer, Quantitative 7.38 (*)     All other components within normal limits    Narrative:     According to the assay 's published package insert, a normal (<0.50 MCGFEU/mL) D-dimer result in conjunction with a non-high clinical probability assessment, excludes deep vein thrombosis (DVT) and pulmonary embolism (PE) with high sensitivity.    D-dimer values increase with age and this can make VTE exclusion of an older population difficult. To address this, the [employer], based on best available evidence and recent guidelines, recommends that clinicians use age-adjusted " "D-dimer thresholds in patients greater than 50 years of age with: a) a low probability of PE who do not meet all Pulmonary Embolism Rule Out Criteria, or b) in those with intermediate probability of PE.   The formula for an age-adjusted D-dimer cut-off is \"age/100\".  For example, a 60 year old patient would have an age-adjusted cut-off of 0.60 MCGFEU/mL and an 80 year old 0.80 MCGFEU/mL.   TROPONIN - Normal    Narrative:     High Sensitive Troponin T Reference Range:  <14.0 ng/L- Negative Female for AMI  <22.0 ng/L- Negative Male for AMI  >=14 - Abnormal Female indicating possible myocardial injury.  >=22 - Abnormal Male indicating possible myocardial injury.   Clinicians would have to utilize clinical acumen, EKG, Troponin, and serial changes to determine if it is an Acute Myocardial Infarction or myocardial injury due to an underlying chronic condition.        BNP (IN-HOUSE) - Normal    Narrative:     This assay is used as an aid in the diagnosis of individuals suspected of having heart failure. It can be used as an aid in the diagnosis of acute decompensated heart failure (ADHF) in patients presenting with signs and symptoms of ADHF to the emergency department (ED). In addition, NT-proBNP of <300 pg/mL indicates ADHF is not likely.    Age Range Result Interpretation  NT-proBNP Concentration (pg/mL:      <50             Positive            >450                   Gray                 300-450                    Negative             <300    50-75           Positive            >900                  Gray                300-900                  Negative            <300      >75             Positive            >1800                  Gray                300-1800                  Negative            <300   RAINBOW DRAW    Narrative:     The following orders were created for panel order [place] Draw.  Procedure                               Abnormality         Status                     ---------                             "   -----------         ------                     Green Top (Gel)[[id number]]                                  Final result               Lavender Top[[id number]]                                     Final result               Gold Top - SST[[id number]]                                   Final result               Light Blue Top[[id number]]                                   Final result                 Please view results for these tests on the individual orders.   HIGH SENSITIVITIY TROPONIN T 2HR   CBC AND DIFFERENTIAL    Narrative:     The following orders were created for panel order CBC & Differential.  Procedure                               Abnormality         Status                     ---------                               -----------         ------                     CBC Auto Differential[[id number]]        Abnormal            Final result                 Please view results for these tests on the individual orders.   GREEN TOP   LAVENDER TOP   GOLD TOP - SST   LIGHT BLUE TOP     CT Angiogram Chest Pulmonary Embolism   Final Result      1. No evidence of pulmonary embolism   2. Trace right pleural effusion   3. Mosaic lung attenuation suggesting small airways obstructive disease   4. Opacity in the lateral basilar left lower lobe likely represents atelectasis, less likely pneumonic infiltrate or nodule. Recommend follow-up in 2 to 3 months   5. Diffusely heterogeneous liver which may relate to diffuse hepatocellular disease with patchy steatosis. It is difficult to exclude the presence of multiple liver masses based on bolus timing for this exam. Recommend contrast-enhanced CT of the abdomen    when appropriate to rule out focal liver lesions   6. Ascites   7. Coronary artery atherosclerosis                        Electronically Signed: Jimmy Fine     11/9/2024 5:20 PM EST     Workstation ID: [id number]      XR Chest 1 View   Final Result   Impression:   No acute cardiopulmonary process.         Electronically  Signed: Garrick Osorio MD     11/9/2024 4:05 PM EST     Workstation ID: [id number]                      HEART Score: 3   Shared Decision Making  I discussed the findings with the patient/patient representative who is in agreement with the treatment plan and the final disposition.  Risks and benefits of discharge and/or observation/admission were discussed: Yes     No data recorded                             Medical Decision Making  Chart Review: Stress test reviewed from 10/16/2024 consistent with low risk study.   Discharge summary reviewed from 10/17/2024 admitted for chest pain.  Was initially at the hospital for colonoscopy and developed left-sided arm and neck pain.  Admitted for further cardiac workup.  Seen by cardiology during admission    Differentials: ACS, electrolyte abnormality, PE, pleurisy, pneumonia     ;this list is not all inclusive and does not constitute the entirety of considered causes  EKG: Interpreted by myself and Dr. Salinas sinus rhythm rate 81 normal EKG this is some artifact previous EKG reviewed from 10/16/2024  Labs: As above  Radiology:  XR Chest 1 View   Final Result    Impression:    No acute cardiopulmonary process.            Electronically Signed: Garrick Osorio MD      11/9/2024 4:05 PM EST      Workstation ID: [id number]     CT Angiogram Chest Pulmonary Embolism    (Results Pending)    Disposition/Treatment:  Appropriate PPE was worn during exam and throughout all encounters with the patient.  While in the ED IV was placed and labs were obtained patient was placed on proper monitors presents to the ED with reports of chest pain is reproducible with palpation.  EKG showed no acute STEMI she was given aspirin for her pain.     CBC shows a white count of 11.6 hemoglobin 11 hematocrit 32 platelets 145.  Hemoglobin 3 weeks ago 10.5 hematocrit 31.9.  Metabolic panel showed glucose 136 potassium 3.4 albumin 2.7  alk phos 124 total bilirubin 2.1 view liver enzymes are  chronically elevated.  BNP and troponin normal.  D-dimer elevated therefore CT PE protocol was ordered and currently pending.  Patient care was transferred to GARETH Headley     Problems Addressed:  Chest pain, unspecified type: acute illness or injury    Amount and/or Complexity of Data Reviewed  Radiology: ordered. Decision-making details documented in ED Course.    Risk  Prescription drug management.        Final diagnoses:   Chest pain, unspecified type       ED Disposition  ED Disposition       ED Disposition   Discharge    Condition   Stable    Comment   --               Zoe Mohr MD  [address] [place] [address]  [phone]               Medication List      No changes were made to your prescriptions during this visit.            Sandra Celestin, APRN  11/09/24 0968

## 2024-11-09 NOTE — DISCHARGE INSTRUCTIONS
Continue current home medications.  Follow-up with your primary care provider.  Return for new or worsening symptoms.

## 2024-11-10 LAB
QT INTERVAL: 411 MS
QTC INTERVAL: 476 MS

## 2024-11-13 ENCOUNTER — TELEPHONE (OUTPATIENT)
Dept: FAMILY MEDICINE CLINIC | Facility: CLINIC | Age: 69
End: 2024-11-13
Payer: MEDICARE

## 2024-11-13 NOTE — TELEPHONE ENCOUNTER
HUB TO RELAY:  ----- Message from Lizbet Arguello sent at 11/13/2024  3:26 PM EST -----  Please notify patient that Cologuard was positive.  She will need a follow-up colonoscopy.  Ask if someone has already ordered that as it appears cologuard was ordered by another provider.

## 2024-11-13 NOTE — LETTER
November 14, 2024    Priscilla Fragoso  [address] [place] [address]        Please notify patient that Cologuard was positive. She will need a follow-up colonoscopy. Ask if someone has already ordered that as it appears cologuard was ordered by another provider.                         Zoe Mohr MD

## 2024-11-15 DIAGNOSIS — I10 PRIMARY HYPERTENSION: ICD-10-CM

## 2024-11-15 DIAGNOSIS — K21.9 GASTROESOPHAGEAL REFLUX DISEASE WITHOUT ESOPHAGITIS: ICD-10-CM

## 2024-11-15 DIAGNOSIS — G89.29 CHRONIC MIDLINE LOW BACK PAIN WITHOUT SCIATICA: ICD-10-CM

## 2024-11-15 DIAGNOSIS — F41.9 ANXIETY: ICD-10-CM

## 2024-11-15 DIAGNOSIS — M54.50 CHRONIC MIDLINE LOW BACK PAIN WITHOUT SCIATICA: ICD-10-CM

## 2024-11-15 RX ORDER — ATENOLOL AND CHLORTHALIDONE TABLET 50; 25 MG/1; MG/1
1 TABLET ORAL DAILY
Qty: 90 TABLET | Refills: 1 | OUTPATIENT
Start: 2024-11-15

## 2024-11-15 RX ORDER — CYCLOBENZAPRINE HCL 5 MG
5 TABLET ORAL 3 TIMES DAILY PRN
Qty: 90 TABLET | Refills: 1 | OUTPATIENT
Start: 2024-11-15

## 2024-11-15 RX ORDER — FAMOTIDINE 20 MG/1
20 TABLET, FILM COATED ORAL NIGHTLY PRN
Qty: 90 TABLET | Refills: 1 | OUTPATIENT
Start: 2024-11-15

## 2024-11-15 RX ORDER — HYDROXYZINE HYDROCHLORIDE 25 MG/1
25 TABLET, FILM COATED ORAL 3 TIMES DAILY PRN
Qty: 90 TABLET | Refills: 1 | OUTPATIENT
Start: 2024-11-15

## 2024-11-15 RX ORDER — CLONIDINE HYDROCHLORIDE 0.1 MG/1
0.1 TABLET ORAL 2 TIMES DAILY
Qty: 180 TABLET | Refills: 1 | OUTPATIENT
Start: 2024-11-15

## 2024-11-26 ENCOUNTER — TELEPHONE (OUTPATIENT)
Dept: FAMILY MEDICINE CLINIC | Facility: CLINIC | Age: 69
End: 2024-11-26
Payer: MEDICARE

## 2024-11-26 DIAGNOSIS — R19.5 POSITIVE COLORECTAL CANCER SCREENING USING COLOGUARD TEST: ICD-10-CM

## 2024-11-26 DIAGNOSIS — Z12.11 SCREENING FOR MALIGNANT NEOPLASM OF COLON: Primary | ICD-10-CM

## 2024-11-26 NOTE — TELEPHONE ENCOUNTER
Caller: Priscilla Fragoso    Relationship to patient: Self    Best call back number: [phone]    Patient is needing:   PATIENT RECEIVED A LETTER STATING THAT HER COLOGUARD TEST WAS POSITIVE.     SHE ISN'T SURE WHAT THAT MEANS OR WHAT SHE NEEDS TO DO NEXT.     PLEASE CALL TO ADVISE.

## 2024-11-26 NOTE — TELEPHONE ENCOUNTER
We need to do a colonoscopy on her to check to see if we see any areas that could be cancerous.  I will again submit a referral to our colorectal specialist and they should call her within a week if she has not heard from them within a week she should call us back again

## 2024-11-27 NOTE — TELEPHONE ENCOUNTER
"HUB TO RELAY    \"We need to do a colonoscopy on her to check to see if we see any areas that could be cancerous. I will again submit a referral to our colorectal specialist and they should call her within a week if she has not heard from them within a week she should call us back again \"  "

## 2024-11-27 NOTE — TELEPHONE ENCOUNTER
Detailed VM left with Provider's information. Instructed patient to call with any questions or concerns.    Susan Rosas MA  11/27/24

## 2024-11-27 NOTE — TELEPHONE ENCOUNTER
Patient called back and states that she had a colonoscopy already on 11/9/2024 and was told by the surgeon that she did not have to have another one for 3 years.

## 2024-12-31 ENCOUNTER — TELEPHONE (OUTPATIENT)
Dept: FAMILY MEDICINE CLINIC | Facility: CLINIC | Age: 69
End: 2024-12-31
Payer: MEDICARE

## 2024-12-31 DIAGNOSIS — E55.9 VITAMIN D DEFICIENCY: ICD-10-CM

## 2024-12-31 DIAGNOSIS — F41.9 ANXIETY: ICD-10-CM

## 2024-12-31 DIAGNOSIS — I10 PRIMARY HYPERTENSION: ICD-10-CM

## 2024-12-31 DIAGNOSIS — G89.29 CHRONIC MIDLINE LOW BACK PAIN WITHOUT SCIATICA: ICD-10-CM

## 2024-12-31 DIAGNOSIS — M54.50 CHRONIC MIDLINE LOW BACK PAIN WITHOUT SCIATICA: ICD-10-CM

## 2024-12-31 DIAGNOSIS — K21.9 GASTROESOPHAGEAL REFLUX DISEASE WITHOUT ESOPHAGITIS: ICD-10-CM

## 2024-12-31 RX ORDER — FAMOTIDINE 20 MG/1
20 TABLET, FILM COATED ORAL NIGHTLY PRN
Qty: 90 TABLET | Refills: 1 | Status: SHIPPED | OUTPATIENT
Start: 2024-12-31

## 2024-12-31 RX ORDER — MONTELUKAST SODIUM 10 MG/1
10 TABLET ORAL NIGHTLY
Qty: 90 TABLET | Refills: 3 | Status: SHIPPED | OUTPATIENT
Start: 2024-12-31

## 2024-12-31 RX ORDER — HYDROXYZINE HYDROCHLORIDE 25 MG/1
25 TABLET, FILM COATED ORAL 3 TIMES DAILY PRN
Qty: 90 TABLET | Refills: 1 | Status: SHIPPED | OUTPATIENT
Start: 2024-12-31

## 2024-12-31 RX ORDER — CLONIDINE HYDROCHLORIDE 0.2 MG/1
0.2 TABLET ORAL 2 TIMES DAILY
Qty: 90 TABLET | Refills: 0 | Status: SHIPPED | OUTPATIENT
Start: 2024-12-31

## 2024-12-31 RX ORDER — ATENOLOL AND CHLORTHALIDONE TABLET 50; 25 MG/1; MG/1
1 TABLET ORAL DAILY
Qty: 90 TABLET | Refills: 1 | Status: SHIPPED | OUTPATIENT
Start: 2024-12-31

## 2024-12-31 NOTE — TELEPHONE ENCOUNTER
Caller: Priscilla Fragoso    Relationship: Self    Best call back number: [phone]    What is the best time to reach you: 10-1    Who are you requesting to speak with (clinical staff, provider,  specific staff member): CLINICAL    What was the call regarding: PATIENT IS RETURNING A CALL FROM THE OFFICE. THEY DIDN'T LEAVE A VOICEMAIL SO WE DON'T KNOW WHO IN THE OFFICE WAS REACHING OUT. PLEASE CALL     Is it okay if the provider responds through MyChart: NO

## 2024-12-31 NOTE — TELEPHONE ENCOUNTER
Rx Refill Note  Requested Prescriptions     Pending Prescriptions Disp Refills    cloNIDine (CATAPRES) 0.2 MG tablet 90 tablet 0     Sig: Take 1 tablet by mouth 2 (Two) Times a Day.    montelukast (SINGULAIR) 10 MG tablet 90 tablet 3     Sig: Take 1 tablet by mouth Every Night.    hydrOXYzine (ATARAX) 25 MG tablet 90 tablet 1     Sig: Take 1 tablet by mouth 3 (Three) Times a Day As Needed for Itching.    famotidine (PEPCID) 20 MG tablet 90 tablet 1     Sig: Take 1 tablet by mouth At Night As Needed for Heartburn.    esomeprazole (nexIUM) 20 MG capsule 90 capsule 3     Sig: Take 1 capsule by mouth Every Morning Before Breakfast.    vitamin D3 (D3 Maximum Strength) 125 MCG (5000 UT) capsule capsule 90 capsule 1     Sig: Take 1 capsule by mouth Daily.    atenolol-chlorthalidone (TENORETIC) 50-25 MG per tablet 90 tablet 1     Sig: Take 1 tablet by mouth Daily.      Last office visit with prescribing clinician: 10/24/2024   Last telemedicine visit with prescribing clinician: Visit date not found   Next office visit with prescribing clinician: 1/24/2025                         Would you like a call back once the refill request has been completed: [] Yes [] No    If the office needs to give you a call back, can they leave a voicemail: [] Yes [] No    Susan Rosas MA  12/31/24, 14:05 EST

## 2025-01-02 ENCOUNTER — LAB (OUTPATIENT)
Dept: FAMILY MEDICINE CLINIC | Facility: CLINIC | Age: 70
End: 2025-01-02
Payer: MEDICARE

## 2025-01-02 DIAGNOSIS — R74.01 ELEVATED TRANSAMINASE LEVEL: ICD-10-CM

## 2025-01-02 LAB
ALBUMIN SERPL-MCNC: 2.8 G/DL (ref 3.5–5.2)
ALBUMIN/GLOB SERPL: 0.5 G/DL
ALP SERPL-CCNC: 149 U/L (ref 39–117)
ALT SERPL W P-5'-P-CCNC: 28 U/L (ref 1–33)
ANION GAP SERPL CALCULATED.3IONS-SCNC: 9 MMOL/L (ref 5–15)
AST SERPL-CCNC: 77 U/L (ref 1–32)
BILIRUB SERPL-MCNC: 2 MG/DL (ref 0–1.2)
BUN SERPL-MCNC: 7 MG/DL (ref 8–23)
BUN/CREAT SERPL: 9.9 (ref 7–25)
CALCIUM SPEC-SCNC: 8.5 MG/DL (ref 8.6–10.5)
CHLORIDE SERPL-SCNC: 105 MMOL/L (ref 98–107)
CO2 SERPL-SCNC: 25 MMOL/L (ref 22–29)
CREAT SERPL-MCNC: 0.71 MG/DL (ref 0.57–1)
EGFRCR SERPLBLD CKD-EPI 2021: 91 ML/MIN/1.73
GLOBULIN UR ELPH-MCNC: 5.1 GM/DL
GLUCOSE SERPL-MCNC: 103 MG/DL (ref 65–99)
POTASSIUM SERPL-SCNC: 4 MMOL/L (ref 3.5–5.2)
PROT SERPL-MCNC: 7.9 G/DL (ref 6–8.5)
SODIUM SERPL-SCNC: 139 MMOL/L (ref 136–145)

## 2025-01-02 PROCEDURE — 80053 COMPREHEN METABOLIC PANEL: CPT | Performed by: PREVENTIVE MEDICINE

## 2025-01-02 PROCEDURE — 36415 COLL VENOUS BLD VENIPUNCTURE: CPT

## 2025-01-03 ENCOUNTER — TELEPHONE (OUTPATIENT)
Dept: FAMILY MEDICINE CLINIC | Facility: CLINIC | Age: 70
End: 2025-01-03
Payer: MEDICARE

## 2025-01-03 DIAGNOSIS — R74.01 ELEVATED TRANSAMINASE LEVEL: Primary | ICD-10-CM

## 2025-01-03 DIAGNOSIS — R16.0 LIVER MASSES: ICD-10-CM

## 2025-01-03 NOTE — TELEPHONE ENCOUNTER
HUB TO RELAY:  ----- Message from Zoe Mohr sent at 1/3/2025  7:30 AM EST -----   Liver functions are still elevated and the chest CT that you had in the emergency room in November did show some abnormalities in your liver that need further evaluation.  I have ordered a CT of your abdomen with contrast and have again put in a referral furl to gastroenterology.      Please call the office if you have not heard from gastroenterology within the next week as it looks like they did try to reach you back in August or September.  Calcium is also slightly low so would advise that you eat foods that are rich in calcium are fortified with calcium in order to improve this level.  Call if any other questions or concerns

## 2025-01-03 NOTE — PROGRESS NOTES
Liver functions are still elevated and the chest CT that you had in the emergency room in November did show some abnormalities in your liver that need further evaluation.  I have ordered a CT of your abdomen with contrast and have again put in a referral furl to gastroenterology.      Please call the office if you have not heard from gastroenterology within the next week as it looks like they did try to reach you back in August or September.  Calcium is also slightly low so would advise that you eat foods that are rich in calcium are fortified with calcium in order to improve this level.  Call if any other questions or concerns

## 2025-01-03 NOTE — TELEPHONE ENCOUNTER
Name: Priscilla Fragoso    Relationship: Self    Best Callback Number: [phone]     HUB PROVIDED THE RELAY MESSAGE FROM THE OFFICE   PATIENT HAS FURTHER QUESTIONS AND WOULD LIKE A CALL BACK AT THE FOLLOWING PHONE NUMBER      ADDITIONAL INFORMATION    RESULTS RELEASED TO PATIENT

## 2025-01-11 NOTE — TELEPHONE ENCOUNTER
Rx Refill Note  Requested Prescriptions     Pending Prescriptions Disp Refills   • potassium chloride (KLOR-CON M20) 20 MEQ CR tablet [Pharmacy Med Name: POTASSIUM CL 20MEQ ER TABLETS] 60 tablet 1     Sig: TAKE 1 TABLET BY MOUTH TWICE DAILY      Last office visit with prescribing clinician: 10/24/2024      Next office visit with prescribing clinician: Visit date not found   3}  Jaelyn Oconnor  01/11/25, 17:47 EST

## 2025-01-12 RX ORDER — POTASSIUM CHLORIDE 1500 MG/1
20 TABLET, EXTENDED RELEASE ORAL 2 TIMES DAILY
Qty: 60 TABLET | Refills: 1 | Status: SHIPPED | OUTPATIENT
Start: 2025-01-12

## 2025-03-15 ENCOUNTER — TELEPHONE (OUTPATIENT)
Dept: FAMILY MEDICINE CLINIC | Facility: CLINIC | Age: 70
End: 2025-03-15
Payer: MEDICARE

## 2025-03-15 NOTE — TELEPHONE ENCOUNTER
Please call patient and make sure she is doing better from the skin rash that caused her to go to [facility] we should check her if symptoms persist

## 2025-03-17 NOTE — TELEPHONE ENCOUNTER
"HUB TO RELAY    \"Please call patient and make sure she is doing better from the skin rash that caused her to go to [facility] we should check her if symptoms persist \"  "

## 2025-04-17 ENCOUNTER — TELEPHONE (OUTPATIENT)
Dept: FAMILY MEDICINE CLINIC | Facility: CLINIC | Age: 70
End: 2025-04-17
Payer: MEDICARE

## 2025-04-17 NOTE — TELEPHONE ENCOUNTER
Following up on CT orders that were put in and not completed by pt. Left VM to call back and discuss.

## 2025-04-21 ENCOUNTER — APPOINTMENT (OUTPATIENT)
Dept: GENERAL RADIOLOGY | Facility: HOSPITAL | Age: 70
End: 2025-04-21
Payer: MEDICARE

## 2025-04-21 ENCOUNTER — APPOINTMENT (OUTPATIENT)
Dept: CARDIOLOGY | Facility: HOSPITAL | Age: 70
End: 2025-04-21
Payer: MEDICARE

## 2025-04-21 ENCOUNTER — HOSPITAL ENCOUNTER (EMERGENCY)
Facility: HOSPITAL | Age: 70
Discharge: HOME | End: 2025-04-21
Admitting: EMERGENCY MEDICINE
Payer: MEDICARE

## 2025-04-21 DIAGNOSIS — L03.119 CELLULITIS OF LOWER EXTREMITY, UNSPECIFIED LATERALITY: Primary | ICD-10-CM

## 2025-04-21 DIAGNOSIS — M79.89 LEG SWELLING: ICD-10-CM

## 2025-04-21 LAB
ALBUMIN SERPL-MCNC: 2.9 G/DL (ref 3.5–5.2)
ALBUMIN/GLOB SERPL: 0.8 G/DL
ALP SERPL-CCNC: 183 U/L (ref 39–117)
ALT SERPL W P-5'-P-CCNC: 19 U/L (ref 1–33)
ANION GAP SERPL CALCULATED.3IONS-SCNC: 12.2 MMOL/L (ref 5–15)
AST SERPL-CCNC: 60 U/L (ref 1–32)
BASOPHILS # BLD AUTO: 0.08 10*3/MM3 (ref 0–0.2)
BASOPHILS NFR BLD AUTO: 1.5 % (ref 0–1.5)
BH CV LOWER VASCULAR LEFT COMMON FEMORAL AUGMENT: NORMAL
BH CV LOWER VASCULAR LEFT COMMON FEMORAL COMPETENT: NORMAL
BH CV LOWER VASCULAR LEFT COMMON FEMORAL COMPRESS: NORMAL
BH CV LOWER VASCULAR LEFT COMMON FEMORAL PHASIC: NORMAL
BH CV LOWER VASCULAR LEFT COMMON FEMORAL SPONT: NORMAL
BH CV LOWER VASCULAR LEFT DISTAL FEMORAL COMPRESS: NORMAL
BH CV LOWER VASCULAR LEFT GASTRONEMIUS COMPRESS: NORMAL
BH CV LOWER VASCULAR LEFT GREATER SAPH AK COMPRESS: NORMAL
BH CV LOWER VASCULAR LEFT GREATER SAPH BK COMPRESS: NORMAL
BH CV LOWER VASCULAR LEFT LESSER SAPH COMPRESS: NORMAL
BH CV LOWER VASCULAR LEFT MID FEMORAL AUGMENT: NORMAL
BH CV LOWER VASCULAR LEFT MID FEMORAL COMPETENT: NORMAL
BH CV LOWER VASCULAR LEFT MID FEMORAL COMPRESS: NORMAL
BH CV LOWER VASCULAR LEFT MID FEMORAL PHASIC: NORMAL
BH CV LOWER VASCULAR LEFT MID FEMORAL SPONT: NORMAL
BH CV LOWER VASCULAR LEFT PERONEAL COMPRESS: NORMAL
BH CV LOWER VASCULAR LEFT POPLITEAL AUGMENT: NORMAL
BH CV LOWER VASCULAR LEFT POPLITEAL COMPETENT: NORMAL
BH CV LOWER VASCULAR LEFT POPLITEAL COMPRESS: NORMAL
BH CV LOWER VASCULAR LEFT POPLITEAL PHASIC: NORMAL
BH CV LOWER VASCULAR LEFT POPLITEAL SPONT: NORMAL
BH CV LOWER VASCULAR LEFT POSTERIOR TIBIAL COMPRESS: NORMAL
BH CV LOWER VASCULAR LEFT PROFUNDA FEMORAL COMPRESS: NORMAL
BH CV LOWER VASCULAR LEFT PROXIMAL FEMORAL COMPRESS: NORMAL
BH CV LOWER VASCULAR LEFT SAPHENOFEMORAL JUNCTION COMPRESS: NORMAL
BH CV LOWER VASCULAR RIGHT COMMON FEMORAL AUGMENT: NORMAL
BH CV LOWER VASCULAR RIGHT COMMON FEMORAL COMPETENT: NORMAL
BH CV LOWER VASCULAR RIGHT COMMON FEMORAL COMPRESS: NORMAL
BH CV LOWER VASCULAR RIGHT COMMON FEMORAL PHASIC: NORMAL
BH CV LOWER VASCULAR RIGHT COMMON FEMORAL SPONT: NORMAL
BH CV LOWER VASCULAR RIGHT DISTAL FEMORAL COMPRESS: NORMAL
BH CV LOWER VASCULAR RIGHT GASTRONEMIUS COMPRESS: NORMAL
BH CV LOWER VASCULAR RIGHT GREATER SAPH AK COMPRESS: NORMAL
BH CV LOWER VASCULAR RIGHT GREATER SAPH BK COMPRESS: NORMAL
BH CV LOWER VASCULAR RIGHT LESSER SAPH COMPRESS: NORMAL
BH CV LOWER VASCULAR RIGHT MID FEMORAL AUGMENT: NORMAL
BH CV LOWER VASCULAR RIGHT MID FEMORAL COMPETENT: NORMAL
BH CV LOWER VASCULAR RIGHT MID FEMORAL COMPRESS: NORMAL
BH CV LOWER VASCULAR RIGHT MID FEMORAL PHASIC: NORMAL
BH CV LOWER VASCULAR RIGHT MID FEMORAL SPONT: NORMAL
BH CV LOWER VASCULAR RIGHT PERONEAL COMPRESS: NORMAL
BH CV LOWER VASCULAR RIGHT POPLITEAL AUGMENT: NORMAL
BH CV LOWER VASCULAR RIGHT POPLITEAL COMPETENT: NORMAL
BH CV LOWER VASCULAR RIGHT POPLITEAL COMPRESS: NORMAL
BH CV LOWER VASCULAR RIGHT POPLITEAL PHASIC: NORMAL
BH CV LOWER VASCULAR RIGHT POPLITEAL SPONT: NORMAL
BH CV LOWER VASCULAR RIGHT POSTERIOR TIBIAL COMPRESS: NORMAL
BH CV LOWER VASCULAR RIGHT PROFUNDA FEMORAL COMPRESS: NORMAL
BH CV LOWER VASCULAR RIGHT PROXIMAL FEMORAL COMPRESS: NORMAL
BH CV LOWER VASCULAR RIGHT SAPHENOFEMORAL JUNCTION COMPRESS: NORMAL
BILIRUB SERPL-MCNC: 1.1 MG/DL (ref 0–1.2)
BUN SERPL-MCNC: 10 MG/DL (ref 8–23)
BUN/CREAT SERPL: 9.7 (ref 7–25)
CALCIUM SPEC-SCNC: 8.5 MG/DL (ref 8.6–10.5)
CHLORIDE SERPL-SCNC: 108 MMOL/L (ref 98–107)
CO2 SERPL-SCNC: 20.8 MMOL/L (ref 22–29)
CREAT SERPL-MCNC: 1.03 MG/DL (ref 0.57–1)
DEPRECATED RDW RBC AUTO: 51.5 FL (ref 37–54)
EGFRCR SERPLBLD CKD-EPI 2021: 58.3 ML/MIN/1.73
EOSINOPHIL # BLD AUTO: 0.17 10*3/MM3 (ref 0–0.4)
EOSINOPHIL NFR BLD AUTO: 3.2 % (ref 0.3–6.2)
ERYTHROCYTE [DISTWIDTH] IN BLOOD BY AUTOMATED COUNT: 14.9 % (ref 12.3–15.4)
GEN 5 1HR TROPONIN T REFLEX: 10 NG/L
GLOBULIN UR ELPH-MCNC: 3.8 GM/DL
GLUCOSE SERPL-MCNC: 133 MG/DL (ref 65–99)
HCT VFR BLD AUTO: 32 % (ref 34–46.6)
HGB BLD-MCNC: 10.3 G/DL (ref 12–15.9)
HOLD SPECIMEN: NORMAL
IMM GRANULOCYTES # BLD AUTO: 0.01 10*3/MM3 (ref 0–0.05)
IMM GRANULOCYTES NFR BLD AUTO: 0.2 % (ref 0–0.5)
LYMPHOCYTES # BLD AUTO: 1.77 10*3/MM3 (ref 0.7–3.1)
LYMPHOCYTES NFR BLD AUTO: 33 % (ref 19.6–45.3)
MCH RBC QN AUTO: 30.3 PG (ref 26.6–33)
MCHC RBC AUTO-ENTMCNC: 32.2 G/DL (ref 31.5–35.7)
MCV RBC AUTO: 94.1 FL (ref 79–97)
MONOCYTES # BLD AUTO: 0.68 10*3/MM3 (ref 0.1–0.9)
MONOCYTES NFR BLD AUTO: 12.7 % (ref 5–12)
NEUTROPHILS NFR BLD AUTO: 2.65 10*3/MM3 (ref 1.7–7)
NEUTROPHILS NFR BLD AUTO: 49.4 % (ref 42.7–76)
NRBC BLD AUTO-RTO: 0 /100 WBC (ref 0–0.2)
NT-PROBNP SERPL-MCNC: 772 PG/ML (ref 0–900)
PLATELET # BLD AUTO: 159 10*3/MM3 (ref 140–450)
PMV BLD AUTO: 12 FL (ref 6–12)
POTASSIUM SERPL-SCNC: 4.2 MMOL/L (ref 3.5–5.2)
PROT SERPL-MCNC: 6.7 G/DL (ref 6–8.5)
RBC # BLD AUTO: 3.4 10*6/MM3 (ref 3.77–5.28)
SODIUM SERPL-SCNC: 141 MMOL/L (ref 136–145)
TROPONIN T NUMERIC DELTA: -1 NG/L
TROPONIN T SERPL HS-MCNC: 11 NG/L
WBC NRBC COR # BLD AUTO: 5.36 10*3/MM3 (ref 3.4–10.8)
WHOLE BLOOD HOLD COAG: NORMAL

## 2025-04-21 PROCEDURE — 99284 EMERGENCY DEPT VISIT MOD MDM: CPT

## 2025-04-21 PROCEDURE — 96365 THER/PROPH/DIAG IV INF INIT: CPT

## 2025-04-21 PROCEDURE — 71045 X-RAY EXAM CHEST 1 VIEW: CPT

## 2025-04-21 PROCEDURE — 84484 ASSAY OF TROPONIN QUANT: CPT | Performed by: NURSE PRACTITIONER

## 2025-04-21 PROCEDURE — 83880 ASSAY OF NATRIURETIC PEPTIDE: CPT | Performed by: NURSE PRACTITIONER

## 2025-04-21 PROCEDURE — 96375 TX/PRO/DX INJ NEW DRUG ADDON: CPT

## 2025-04-21 PROCEDURE — 25010000002 CLINDAMYCIN PER 300 MG

## 2025-04-21 PROCEDURE — 80053 COMPREHEN METABOLIC PANEL: CPT | Performed by: NURSE PRACTITIONER

## 2025-04-21 PROCEDURE — 85025 COMPLETE CBC W/AUTO DIFF WBC: CPT | Performed by: NURSE PRACTITIONER

## 2025-04-21 PROCEDURE — 25010000002 FUROSEMIDE PER 20 MG

## 2025-04-21 PROCEDURE — 36415 COLL VENOUS BLD VENIPUNCTURE: CPT

## 2025-04-21 PROCEDURE — 93970 EXTREMITY STUDY: CPT

## 2025-04-21 PROCEDURE — 93970 EXTREMITY STUDY: CPT | Performed by: SURGERY

## 2025-04-21 RX ORDER — FUROSEMIDE 20 MG/1
20 TABLET ORAL 2 TIMES DAILY
Qty: 20 TABLET | Refills: 0 | Status: SHIPPED | OUTPATIENT
Start: 2025-04-21 | End: 2025-05-01

## 2025-04-21 RX ORDER — CLINDAMYCIN HYDROCHLORIDE 150 MG/1
450 CAPSULE ORAL 3 TIMES DAILY
Qty: 63 CAPSULE | Refills: 0 | Status: SHIPPED | OUTPATIENT
Start: 2025-04-21 | End: 2025-04-28

## 2025-04-21 RX ORDER — CLINDAMYCIN PHOSPHATE 600 MG/50ML
600 INJECTION, SOLUTION INTRAVENOUS ONCE
Status: COMPLETED | OUTPATIENT
Start: 2025-04-21 | End: 2025-04-21

## 2025-04-21 RX ORDER — FUROSEMIDE 10 MG/ML
40 INJECTION INTRAMUSCULAR; INTRAVENOUS ONCE
Status: COMPLETED | OUTPATIENT
Start: 2025-04-21 | End: 2025-04-21

## 2025-04-21 RX ADMIN — FUROSEMIDE 40 MG: 10 INJECTION, SOLUTION INTRAMUSCULAR; INTRAVENOUS at 20:50

## 2025-04-21 RX ADMIN — CLINDAMYCIN PHOSPHATE 600 MG: 600 INJECTION, SOLUTION INTRAVENOUS at 20:50

## 2025-04-21 NOTE — ED PROVIDER NOTES
Provider in Triage Note  Patient is a 69-year-old female presents the ED with complaint of bilateral lower extremity swelling.  For the past 2 weeks, worse in the left than right.  Reports associated chest discomfort, dyspnea.  No fevers.  No history of CHF.      Due to significant overcrowding in the emergency department patient was initially seen and evaluated in triage.  Provider in triage recommended patient placement in the treatment area to initiate therapy and movement to an ER bed as soon as possible.   Orders placed; medications will be deferred to main provider per protocol.        Subjective   History of Present Illness  Reviewed provider in triage note and agree with the edition patient reports that she has had this leg swelling before and taken a water pill and it did help.  Review of Systems   Musculoskeletal:  Positive for myalgias.   Skin:  Positive for color change and rash.   All other systems reviewed and are negative.      Past Medical History:   Diagnosis Date    Anesthesia complication     stated was given too much    Asthma     GERD (gastroesophageal reflux disease)     GI bleed     Headache     Hyperlipidemia     Hypertension     Peptic ulceration     PONV (postoperative nausea and vomiting)        Allergies   Allergen Reactions    Codeine Anaphylaxis    Penicillins Swelling    Sulfa Antibiotics Rash    Tetanus Toxoids Rash       Past Surgical History:   Procedure Laterality Date    COLONOSCOPY N/A 10/17/2024    Procedure: COLONOSCOPY WITH POLYPECTOMY X 1, BIOPSY X 1;  Surgeon: Joshua Lindsay MD;  Location: [facility] ENDOSCOPY;  Service: General;  Laterality: N/A;  POLYP,    EYE SURGERY      TUBAL ABDOMINAL LIGATION         No family history on file.    Social History     Socioeconomic History    Marital status:    Tobacco Use    Smoking status: Former     Current packs/day: 0.00     Average packs/day: 3.0 packs/day for 15.1 years (45.4 ttl pk-yrs)     Types: Cigarettes     Start date:       Quit date: 1994     Years since quittin.1     Passive exposure: Past    Smokeless tobacco: Never   Vaping Use    Vaping status: Never Used   Substance and Sexual Activity    Alcohol use: Not Currently    Drug use: Never    Sexual activity: Defer           Objective   Physical Exam  Vitals and nursing note reviewed.   Constitutional:       General: She is not in acute distress.     Appearance: Normal appearance. She is not ill-appearing, toxic-appearing or diaphoretic.   HENT:      Head: Normocephalic and atraumatic.      Right Ear: External ear normal.      Left Ear: External ear normal.      Nose: Nose normal.      Mouth/Throat:      Mouth: Mucous membranes are moist.      Pharynx: Oropharynx is clear.   Eyes:      Extraocular Movements: Extraocular movements intact.      Conjunctiva/sclera: Conjunctivae normal.      Pupils: Pupils are equal, round, and reactive to light.   Cardiovascular:      Rate and Rhythm: Normal rate and regular rhythm.      Pulses: Normal pulses.      Heart sounds: Normal heart sounds.   Pulmonary:      Effort: Pulmonary effort is normal.      Breath sounds: Normal breath sounds.   Abdominal:      General: Bowel sounds are normal.      Palpations: Abdomen is soft.   Musculoskeletal:         General: Tenderness (Bilateral feet and lower legs) present.      Cervical back: Normal range of motion and neck supple.      Right lower leg: Edema present.      Left lower leg: Edema present.   Skin:     General: Skin is warm and dry.      Capillary Refill: Capillary refill takes 2 to 3 seconds.      Findings: Erythema (Noted to bilateral lower extremities starting in the toes and working up to just past the ankles) and rash (Noted to left arm and abdomen.  Patient reports that she believes it is a shingles outbreak however it does not follow a dermatome.) present.   Neurological:      General: No focal deficit present.      Mental Status: She is alert and oriented to person, place,  "and time.   Psychiatric:         Mood and Affect: Mood normal.         Behavior: Behavior normal.         Thought Content: Thought content normal.         Judgment: Judgment normal.         Procedures           ED Course  ED Course as of 04/21/25 2049 Mon Apr 21, 2025 2026 Duplex Venous Lower Extremity - Bilateral CAR  Additional Study Details    Right sided fluid retention present.  Left sided fluid retention present.  Preliminary Findings    Venous doppler is all negative for dvt/svt. Bilateral fluid retention present.     [DT]      ED Course User Index  [DT] Susan Schroeder, APRN      BP (!) 184/77   Pulse 68   Temp 97.6 °F (36.4 °C) (Oral)   Resp 18   Ht 152.4 cm (60\")   Wt 64.4 kg (141 lb 15.6 oz)   LMP  (LMP Unknown)   SpO2 98%   BMI 27.73 kg/m²   Labs Reviewed   COMPREHENSIVE METABOLIC PANEL - Abnormal; Notable for the following components:       Result Value    Glucose 133 (*)     Creatinine 1.03 (*)     Chloride 108 (*)     CO2 20.8 (*)     Calcium 8.5 (*)     Albumin 2.9 (*)     AST (SGOT) 60 (*)     Alkaline Phosphatase 183 (*)     eGFR 58.3 (*)     All other components within normal limits    Narrative:     GFR Categories in Chronic Kidney Disease (CKD)      GFR Category          GFR (mL/min/1.73)    Interpretation  G1                     90 or greater         Normal or high (1)  G2                      60-89                Mild decrease (1)  G3a                   45-59                Mild to moderate decrease  G3b                   30-44                Moderate to severe decrease  G4                    15-29                Severe decrease  G5                    14 or less           Kidney failure          (1)In the absence of evidence of kidney disease, neither GFR category G1 or G2 fulfill the criteria for CKD.    eGFR calculation 2021 CKD-EPI creatinine equation, which does not include race as a factor   CBC WITH AUTO DIFFERENTIAL - Abnormal; Notable for the following components:    RBC " 3.40 (*)     Hemoglobin 10.3 (*)     Hematocrit 32.0 (*)     Monocyte % 12.7 (*)     All other components within normal limits   BNP (IN-HOUSE) - Normal    Narrative:     This assay is used as an aid in the diagnosis of individuals suspected of having heart failure. It can be used as an aid in the diagnosis of acute decompensated heart failure (ADHF) in patients presenting with signs and symptoms of ADHF to the emergency department (ED). In addition, NT-proBNP of <300 pg/mL indicates ADHF is not likely.    Age Range Result Interpretation  NT-proBNP Concentration (pg/mL:      <50             Positive            >450                   Gray                 300-450                    Negative             <300    50-75           Positive            >900                  Gray                300-900                  Negative            <300      >75             Positive            >1800                  Gray                300-1800                  Negative            <300   TROPONIN - Normal    Narrative:     High Sensitive Troponin T Reference Range:  <14.0 ng/L- Negative Female for AMI  <22.0 ng/L- Negative Male for AMI  >=14 - Abnormal Female indicating possible myocardial injury.  >=22 - Abnormal Male indicating possible myocardial injury.   Clinicians would have to utilize clinical acumen, EKG, Troponin, and serial changes to determine if it is an Acute Myocardial Infarction or myocardial injury due to an underlying chronic condition.        HIGH SENSITIVITIY TROPONIN T 1HR - Normal    Narrative:     High Sensitive Troponin T Reference Range:  <14.0 ng/L- Negative Female for AMI  <22.0 ng/L- Negative Male for AMI  >=14 - Abnormal Female indicating possible myocardial injury.  >=22 - Abnormal Male indicating possible myocardial injury.   Clinicians would have to utilize clinical acumen, EKG, Troponin, and serial changes to determine if it is an Acute Myocardial Infarction or myocardial injury due to an underlying  chronic condition.        CBC AND DIFFERENTIAL    Narrative:     The following orders were created for panel order CBC & Differential.  Procedure                               Abnormality         Status                     ---------                               -----------         ------                     CBC Auto Differential[[id number]]        Abnormal            Final result                 Please view results for these tests on the individual orders.   EXTRA TUBES    Narrative:     The following orders were created for panel order Extra Tubes.  Procedure                               Abnormality         Status                     ---------                               -----------         ------                     Gold Top - SST[[id number]]                                   Final result               Light Blue Top[[id number]]                                   Final result                 Please view results for these tests on the individual orders.   GOLD TOP - SST   LIGHT BLUE TOP     Medications   furosemide (LASIX) injection 40 mg (has no administration in time range)   clindamycin (CLEOCIN) 600 mg in sodium chloride 0.9% 50 mL IVPB (premix) (has no administration in time range)     XR Chest 1 View  Result Date: 4/21/2025  Impression: Small right pleural effusion and mild wispy right base opacities which may reflect some associated atelectasis. Electronically Signed: Jaswant Jeff MD  4/21/2025 6:57 PM EDT  Workstation ID: [id number]                                                     Medical Decision Making  Problems Addressed:  Cellulitis of lower extremity, unspecified laterality: complicated acute illness or injury  Leg swelling: complicated acute illness or injury    Amount and/or Complexity of Data Reviewed  Labs: ordered.  Radiology: ordered. Decision-making details documented in ED Course.    Risk  Prescription drug management.      Patient presents to the ED for the above complaint, underwent the  above exam and workup.    EKG reviewed: Not clinically indicated at this time.  Patient reports no chest pain shortness of breath.    Imaging reviewed: As reviewed interpreted by Dr. Root, ED attending and myself chest x-ray shows small right pleural effusion and opacities which may reflect some atelectasis.  Doppler shows no DVTs.    Reviewed external records from [facility] from 3/14/2025 she was diagnosed with bite of nonvenomous arthropod.  Per the report these areas are in the same area where she reports that she believes are shingles outbreaks..    Differential diagnosis considered: Cellulitis, CHF exacerbation, DVT    Patient was treated with the following medications while in the ED;   Medications   furosemide (LASIX) injection 40 mg (has no administration in time range)   clindamycin (CLEOCIN) 600 mg in sodium chloride 0.9% 50 mL IVPB (premix) (has no administration in time range)     Upon evaluation of patient IV was established labs imaging were obtained while in the pit.  Once she was brought to her room I was able to evaluate her legs and made our arrhythmia test with small blisters on the left great toe.  The swelling goes up to about the knee on both legs.  1+ pitting.  Patient reports that she had a visit last month at [facility] for insect bites that she now refers to as her shingles.  They are not consistent with a shingles infection and in fact more consistent with insect bites she was given Lasix and clinda mycin in the ED.  Prescription for same to pharmacy of her choice.  Advised to follow-up with primary care and complete medications as directed.  She is agreeable to this plan and has no further questions.    Consideration was given for admission, but the patient was stable for outpatient management as patient was ambulatory, nontoxic, stable, and afebrile.  Exam as above.    Disposition: Discussed need to follow-up diagnostics, including incidental findings.  Discharged with  instructions to obtain outpatient follow-up with patient's symptoms and findings, with strict return precautions if patient develops new or worsening symptoms.    This document is intended for medical expert use only. Reading of this document by patients and/or patient's family without participating medical staff guidance may result in misinterpretation and unintended morbidity.  Any interpretation of such data is the responsibility of the patient and/or family member responsible for the patient in concert with their primary or specialist providers, not to be left for sources of online searches such as [employer], [employer] or similar queries. Relying on these approaches to knowledge may result in misinterpretation, misguided goals of care and even death should patients or family members try recommendations outside of the realm of professional medical care in a supervised inpatient environment.       Final diagnoses:   Leg swelling   Cellulitis of lower extremity, unspecified laterality       ED Disposition  ED Disposition       ED Disposition   Discharge    Condition   Stable    Comment   --               Zoe Mohr MD  [address] [facility] [place] [address] [phone]               Medication List        New Prescriptions      clindamycin 150 MG capsule  Commonly known as: CLEOCIN  Take 3 capsules by mouth 3 (Three) Times a Day for 7 days.     furosemide 20 MG tablet  Commonly known as: LASIX  Take 1 tablet by mouth 2 (Two) Times a Day for 10 days.               Where to Get Your Medications        These medications were sent to [id number] - [place], [place] - [address] AT [facility] [address] - [phone]  - [phone]   [address] [place] [address]      Phone: [phone]   clindamycin 150 MG capsule  furosemide 20 MG tablet            Susan Schroeder, APRN  04/21/25 5432

## 2025-04-22 ENCOUNTER — TELEPHONE (OUTPATIENT)
Dept: FAMILY MEDICINE CLINIC | Facility: CLINIC | Age: 70
End: 2025-04-22
Payer: MEDICARE

## 2025-04-22 VITALS
DIASTOLIC BLOOD PRESSURE: 77 MMHG | RESPIRATION RATE: 18 BRPM | TEMPERATURE: 97.6 F | WEIGHT: 141.98 LBS | HEART RATE: 68 BPM | SYSTOLIC BLOOD PRESSURE: 184 MMHG | BODY MASS INDEX: 27.87 KG/M2 | HEIGHT: 60 IN | OXYGEN SATURATION: 98 %

## 2025-04-22 NOTE — TELEPHONE ENCOUNTER
Please call patient and make sure that she has a follow-up scheduled with one of the nurse practitioners or with her cardiologist sometime this week.

## 2025-04-22 NOTE — DISCHARGE INSTRUCTIONS
Take medications as prescribed.  Make sure you take them exactly as directed.    Follow-up with your primary care provider, call tomorrow morning to make an appointment.    Return to the ER for any new or worsening symptoms.  
Strong peripheral pulses

## 2025-04-24 ENCOUNTER — OFFICE VISIT (OUTPATIENT)
Dept: FAMILY MEDICINE CLINIC | Facility: CLINIC | Age: 70
End: 2025-04-24
Payer: MEDICARE

## 2025-04-24 VITALS
OXYGEN SATURATION: 96 % | DIASTOLIC BLOOD PRESSURE: 62 MMHG | SYSTOLIC BLOOD PRESSURE: 128 MMHG | TEMPERATURE: 97.7 F | BODY MASS INDEX: 26 KG/M2 | WEIGHT: 132.4 LBS | HEIGHT: 60 IN | HEART RATE: 61 BPM | RESPIRATION RATE: 18 BRPM

## 2025-04-24 DIAGNOSIS — M79.89 LEG SWELLING: ICD-10-CM

## 2025-04-24 DIAGNOSIS — L03.119 CELLULITIS OF LOWER EXTREMITY, UNSPECIFIED LATERALITY: Primary | ICD-10-CM

## 2025-04-24 RX ORDER — TRIAMCINOLONE ACETONIDE 1 MG/G
1 OINTMENT TOPICAL 2 TIMES DAILY
COMMUNITY
Start: 2025-04-01

## 2025-04-24 RX ORDER — MUPIROCIN 20 MG/G
1 OINTMENT TOPICAL 3 TIMES DAILY
COMMUNITY
Start: 2025-04-01

## 2025-04-24 NOTE — PROGRESS NOTES
Subjective   Priscilla Fragoso is a 69 y.o. female presents for   Chief Complaint   Patient presents with    Follow-up    Cellulitis       Health Maintenance Due   Topic Date Due    DXA SCAN  Never done    MAMMOGRAM  Never done    ZOSTER VACCINE (1 of 2) Never done    COVID-19 Vaccine ( season) 2025    ANNUAL WELLNESS VISIT  05/15/2025       History of Present Illness  The patient presents for evaluation of cellulitis, sinus issues, and foot pain.    Emergency care was sought on 2025 due to cellulitis. Discharge instructions included a prescription for antibiotics and a diuretic, along with modifications to her medication regimen. Compliance with the new medication schedule has been maintained. Clindamycin has been taken since the previous night, with doses administered at 5:30 PM, 1:00 AM, and 7:00 AM. Attempts to elevate her legs during sleep have been made, but this position has caused discomfort due to hip pain. Significant stress related to her daughters' health issues has been reported. A sensation of something being broken in her foot, causing pain when walking without shoes or socks, has been present since the hospital visit. No recent foot injury is reported, but pain intensifies upon stepping. The pain is suspected to be due to swelling and pressure.     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 Chest pain similar to that felt during her previous bout of pneumonia has been experienced.    FAMILY HISTORY  Her family has a history of diabetes, strokes, and heart attacks. All members of her mother's and father's generation have  of heart attacks except her father. Her oldest brother  of a heart attack, and her youngest brother  of liver cancer.    Vitals:    25 1308   BP: 128/62   BP Location: Right arm   Patient Position: Sitting   Cuff Size: Adult   Pulse: 61   Resp: 18   Temp: 97.7 °F (36.5 °C)  "  [facility]: Oral   SpO2: 96%   Weight: 60.1 kg (132 lb 6.4 oz)   Height: 152.4 cm (60\")     Body mass index is 25.86 kg/m².    Current Outpatient Medications on File Prior to Visit   Medication Sig Dispense Refill    albuterol (ACCUNEB) 0.63 MG/3ML nebulizer solution Take 3 mL by nebulization Every Night. 100 each 3    albuterol sulfate  (90 Base) MCG/ACT inhaler Inhale 2 puffs Every 4 (Four) Hours As Needed for Wheezing. 54 g 3    aspirin 325 MG tablet Take 1 tablet by mouth Daily.      atenolol-chlorthalidone (TENORETIC) 50-25 MG per tablet Take 1 tablet by mouth Daily. 90 tablet 1    B-COMPLEX-C PO Take  by mouth.      clindamycin (CLEOCIN) 150 MG capsule Take 3 capsules by mouth 3 (Three) Times a Day for 7 days. 63 capsule 0    cloNIDine (CATAPRES) 0.2 MG tablet TAKE 1 TABLET BY MOUTH TWICE DAILY 180 tablet 1    cyclobenzaprine (FLEXERIL) 5 MG tablet Take 1 tablet by mouth 3 (Three) Times a Day As Needed for Muscle Spasms. 90 tablet 1    esomeprazole (nexIUM) 20 MG capsule Take 1 capsule by mouth Every Morning Before Breakfast. 90 capsule 3    famotidine (PEPCID) 20 MG tablet Take 1 tablet by mouth At Night As Needed for Heartburn. 90 tablet 1    furosemide (LASIX) 20 MG tablet Take 1 tablet by mouth 2 (Two) Times a Day for 10 days. 20 tablet 0    hydrOXYzine (ATARAX) 25 MG tablet Take 1 tablet by mouth 3 (Three) Times a Day As Needed for Itching. 90 tablet 1    montelukast (SINGULAIR) 10 MG tablet Take 1 tablet by mouth Every Night. 90 tablet 3    mupirocin (BACTROBAN) 2 % ointment Apply 1 Application topically to the appropriate area as directed 3 (Three) Times a Day.      potassium chloride (KLOR-CON M20) 20 MEQ CR tablet TAKE 1 TABLET BY MOUTH TWICE DAILY 60 tablet 1    Repatha solution prefilled syringe injection INJECT 1 MILLILITER UNDER THE SKIN AS DIRECTED EVERY 14 DAYS 6 mL 3    triamcinolone (KENALOG) 0.1 % ointment Apply 1 Application topically to the appropriate area as directed 2 (Two) Times a " Day.      vitamin D3 (D3 Maximum Strength) 125 MCG (5000 UT) capsule capsule Take 1 capsule by mouth Daily. 90 capsule 1    [DISCONTINUED] cloNIDine (CATAPRES) 0.2 MG tablet Take 1 tablet by mouth 2 (Two) Times a Day. 90 tablet 0    [DISCONTINUED] fluticasone (FLONASE) 50 MCG/ACT nasal spray Administer 2 sprays into the nostril(s) as directed by provider Daily. 1 g 0     No current facility-administered medications on file prior to visit.       The following portions of the patient's history were reviewed and updated as appropriate: allergies, current medications, past family history, past medical history, past social history, past surgical history, and problem list.    Review of Systems   Cardiovascular:  Positive for leg swelling.   Skin:         Redness in lower extremities       Objective   Physical Exam  Vitals and nursing note reviewed.   Constitutional:       Appearance: Normal appearance. She is well-developed.   HENT:      Head: Normocephalic and atraumatic.      Right Ear: External ear normal.      Left Ear: External ear normal.      Nose: Nose normal.   Eyes:      Extraocular Movements: Extraocular movements intact.      Pupils: Pupils are equal, round, and reactive to light.   Cardiovascular:      Rate and Rhythm: Normal rate and regular rhythm.      Heart sounds: Normal heart sounds.   Pulmonary:      Effort: Pulmonary effort is normal.      Breath sounds: Normal breath sounds.   Abdominal:      General: Bowel sounds are normal.      Palpations: Abdomen is soft.   Genitourinary:     Vagina: Normal.   Musculoskeletal:         General: Normal range of motion.      Cervical back: Normal range of motion and neck supple.      Right lower leg: Edema (nonpitting) present.      Left lower leg: Edema (2+) present.        Feet:    Skin:     General: Skin is warm and dry.      Findings: Erythema (Bilateral lower extremities, left worse than right) present.   Neurological:      General: No focal deficit present.       Mental Status: She is alert and oriented to person, place, and time.   Psychiatric:         Mood and Affect: Mood normal.         Behavior: Behavior normal.         Judgment: Judgment normal.          Respiratory: Clear to auscultation, no wheezing, rales or rhonchi  Cardiovascular: Regular rate and rhythm, no murmurs, rubs, or gallops  Extremities: Swelling noted in the right foot, no visible injury  Skin: Cellulitis noted on the legs, improvement observed on the right leg    PHQ-9 Total Score:      Results  Labs   - Potassium levels: Normal   - Labs to check for heart failure: Normal    Imaging   - Ultrasound of legs: No blood clots on either side   - EKG: Normal    Assessment & Plan   Diagnoses and all orders for this visit:    1. Cellulitis of lower extremity, unspecified laterality (Primary)  -     Cancel: Comprehensive metabolic panel; Future  -     Cancel: CBC w AUTO Differential; Future  -     CBC w AUTO Differential; Future  -     Comprehensive metabolic panel; Future    2. Leg swelling         1. Cellulitis.  - The patient's condition is expected to improve with the continuation of antibiotic therapy and has already had some improvement with less than 24 hours of antibiotics.  - An ultrasound of the lower extremities revealed no evidence of thrombosis.  - Laboratory results were slightly abnormal, potentially due to the ongoing infection. There was some fluid accumulation on the right side, which could indicate early-stage pneumonia. However, tests for heart failure returned normal results.  - A repeat laboratory test will be conducted in approximately 2 weeks to ensure all parameters have normalized. If there is no significant improvement in swelling and redness by Monday, she is instructed to contact us. If the foot pain persists beyond Monday or when swelling resolves, an x-ray will be ordered for further evaluation.    2. Foot pain.  - She has been experiencing foot pain since her hospital visit.  -  Reports no recent foot injury but notes that the pain intensifies upon stepping.  - Suspects that the pain may be due to swelling and pressure.  - If the foot pain persists beyond Monday, an x-ray will be ordered for further evaluation.    Follow-up  - The patient will follow up with Dr. Murray in 3 months.    There are no Patient Instructions on file for this visit.         Patient or patient representative verbalized consent for the use of Ambient Listening during the visit with  DAYRON Vargas for chart documentation. 4/24/2025  13:38 EDT

## 2025-05-09 ENCOUNTER — RESULTS FOLLOW-UP (OUTPATIENT)
Dept: FAMILY MEDICINE CLINIC | Facility: CLINIC | Age: 70
End: 2025-05-09
Payer: MEDICARE

## 2025-05-12 NOTE — TELEPHONE ENCOUNTER
Hub to relay    Hemoglobin has only slightly improved. Please ask her if her leg infection is continuing to improve or has resolved? Platelets are also low, will monitor this. Sodium is slightly elevated. Limit sodium in diet. Liver function numbers are worse. Please ask her if she has her CT ordered back in January scheduled? Also has she been able to schedule an appt with GI? I highly recommend she do both of these.

## 2025-05-12 NOTE — TELEPHONE ENCOUNTER
Name: Priscilla Fragoso      Relationship: Self      Best Callback Number: [phone]      HUB PROVIDED THE RELAY MESSAGE FROM THE OFFICE      PATIENT: HAS FURTHER QUESTIONS AND WOULD LIKE A CALL BACK AT THE FOLLOWING PHONE NUMBER   [phone]    ADDITIONAL INFORMATION:

## 2025-05-16 ENCOUNTER — TELEPHONE (OUTPATIENT)
Dept: FAMILY MEDICINE CLINIC | Facility: CLINIC | Age: 70
End: 2025-05-16
Payer: MEDICARE

## 2025-05-16 RX ORDER — DOXYCYCLINE 100 MG/1
100 CAPSULE ORAL 2 TIMES DAILY
Qty: 20 CAPSULE | Refills: 0 | Status: SHIPPED | OUTPATIENT
Start: 2025-05-16

## 2025-05-16 NOTE — TELEPHONE ENCOUNTER
Per  verbal release authorization, detailed VM left with Providers new prescription and instructions. Instructed patient to call with any questions or concerns.    Susan Rosas MA  05/16/25

## 2025-05-16 NOTE — TELEPHONE ENCOUNTER
I have sent in doxycycline to see if that antibiotic will work better for her.  Alcohol use can contribute to swelling and additional health problems.  Encouraged her to elevate legs intermittently as well.  If no improvement with doxycycline, she will need to be reevaluated in the office.

## 2025-05-16 NOTE — TELEPHONE ENCOUNTER
Pt's sister called to notify me that her sister is not getting better since her last visit and after taking the antibiotics. She believes this is due to her drinking in combination with the antibiotics and wondered if the drinking could have contributed to the medicine not working? She wanted me to notify her PCP.

## 2025-05-22 ENCOUNTER — HOSPITAL ENCOUNTER (OUTPATIENT)
Dept: CT IMAGING | Facility: HOSPITAL | Age: 70
Discharge: HOME | End: 2025-05-22
Admitting: PREVENTIVE MEDICINE
Payer: MEDICARE

## 2025-05-22 DIAGNOSIS — R16.0 LIVER MASSES: ICD-10-CM

## 2025-05-22 PROCEDURE — 74177 CT ABD & PELVIS W/CONTRAST: CPT

## 2025-05-22 PROCEDURE — 25510000001 IOPAMIDOL PER 1 ML: Performed by: PREVENTIVE MEDICINE

## 2025-05-22 RX ORDER — IOPAMIDOL 755 MG/ML
100 INJECTION, SOLUTION INTRAVASCULAR
Status: COMPLETED | OUTPATIENT
Start: 2025-05-22 | End: 2025-05-22

## 2025-05-22 RX ADMIN — IOPAMIDOL 100 ML: 755 INJECTION, SOLUTION INTRAVENOUS at 12:06

## 2025-05-27 ENCOUNTER — RESULTS FOLLOW-UP (OUTPATIENT)
Dept: FAMILY MEDICINE CLINIC | Facility: CLINIC | Age: 70
End: 2025-05-27
Payer: MEDICARE

## 2025-05-27 NOTE — PROGRESS NOTES
No discrete lesions or masses were seen but the liver was irregular and felt to be cirrhotic with elevated blood pressure in the portal system.  There was a small amount of ascites in the pelvis.  If patient has not seen gastroenterology about this we would recommend that we place the referral and she sees them.  Please let me know if she agrees.

## 2025-05-28 NOTE — TELEPHONE ENCOUNTER
Hub to relay    No discrete lesions or masses were seen but the liver was irregular and felt to be cirrhotic with elevated blood pressure in the portal system.  There was a small amount of ascites in the pelvis.  If patient has not seen gastroenterology about this we would recommend that we place the referral and she sees them.  Please let me know if she agrees.

## 2025-06-22 PROBLEM — K74.69 OTHER CIRRHOSIS OF LIVER: Status: ACTIVE | Noted: 2025-06-22

## 2025-06-22 PROBLEM — R19.5 POSITIVE COLORECTAL CANCER SCREENING USING COLOGUARD TEST: Status: ACTIVE | Noted: 2025-06-22

## 2025-07-02 DIAGNOSIS — I10 PRIMARY HYPERTENSION: ICD-10-CM

## 2025-07-02 RX ORDER — CLONIDINE HYDROCHLORIDE 0.2 MG/1
0.2 TABLET ORAL 2 TIMES DAILY
Qty: 90 TABLET | Refills: 0 | OUTPATIENT
Start: 2025-07-02

## 2025-07-02 RX ORDER — CLONIDINE HYDROCHLORIDE 0.2 MG/1
0.2 TABLET ORAL 2 TIMES DAILY
Qty: 90 TABLET | Refills: 0 | Status: SHIPPED | OUTPATIENT
Start: 2025-07-02

## 2025-07-02 NOTE — TELEPHONE ENCOUNTER
Caller: Priscilla Fragoso    Relationship: Self    Best call back number:[phone]     Requested Prescriptions:   Requested Prescriptions     Pending Prescriptions Disp Refills    cloNIDine (CATAPRES) 0.2 MG tablet 90 tablet 0     Sig: Take 1 tablet by mouth 2 (Two) Times a Day.        Pharmacy where request should be sent: [id number] - [place], [place] - [address] AT [address] [phone] [facility] [phone]      Last office visit with prescribing clinician: 10/24/2024   Last telemedicine visit with prescribing clinician: Visit date not found   Next office visit with prescribing clinician: Visit date not found       Does the patient have less than a 3 day supply:  [x] Yes  [] No        Lisa Costa Rep   07/02/25 10:58 EDT

## 2025-07-30 DIAGNOSIS — K21.9 GASTROESOPHAGEAL REFLUX DISEASE WITHOUT ESOPHAGITIS: ICD-10-CM

## 2025-07-30 DIAGNOSIS — I10 PRIMARY HYPERTENSION: ICD-10-CM

## 2025-07-31 RX ORDER — ATENOLOL AND CHLORTHALIDONE TABLET 50; 25 MG/1; MG/1
1 TABLET ORAL DAILY
Qty: 90 TABLET | Refills: 1 | Status: SHIPPED | OUTPATIENT
Start: 2025-07-31

## 2025-07-31 RX ORDER — FAMOTIDINE 20 MG/1
20 TABLET, FILM COATED ORAL NIGHTLY PRN
Qty: 90 TABLET | Refills: 1 | Status: SHIPPED | OUTPATIENT
Start: 2025-07-31

## 2025-07-31 NOTE — TELEPHONE ENCOUNTER
Rx Refill Note  Requested Prescriptions     Pending Prescriptions Disp Refills   • famotidine (PEPCID) 20 MG tablet [Pharmacy Med Name: FAMOTIDINE 20MG TABLETS] 90 tablet 1     Sig: TAKE 1 TABLET BY MOUTH AT NIGHT AS NEEDED FOR HEARTBURN     Signed Prescriptions Disp Refills   • atenolol-chlorthalidone (TENORETIC) 50-25 MG per tablet 90 tablet 1     Sig: TAKE 1 TABLET BY MOUTH DAILY     Authorizing Provider: RUSS ANDERSEN     Ordering User: [email or web address] POTTER      Last office visit with prescribing clinician: 10/24/2024   Last telemedicine visit with prescribing clinician: Visit date not found   Next office visit with prescribing clinician: Visit date not found       Would you like a call back once the refill request has been completed: [] Yes [] No    If the office needs to give you a call back, can they leave a voicemail: [] Yes [] No    Edwige Potter MA  07/31/25, 07:57 EDT

## 2025-08-22 ENCOUNTER — TELEPHONE (OUTPATIENT)
Dept: FAMILY MEDICINE CLINIC | Facility: CLINIC | Age: 70
End: 2025-08-22
Payer: MEDICARE

## 2025-08-25 ENCOUNTER — TELEPHONE (OUTPATIENT)
Dept: FAMILY MEDICINE CLINIC | Facility: CLINIC | Age: 70
End: 2025-08-25
Payer: MEDICARE

## 2025-08-25 ENCOUNTER — OFFICE VISIT (OUTPATIENT)
Dept: FAMILY MEDICINE CLINIC | Facility: CLINIC | Age: 70
End: 2025-08-25
Payer: MEDICARE

## 2025-08-25 VITALS
TEMPERATURE: 98.2 F | OXYGEN SATURATION: 98 % | HEART RATE: 68 BPM | WEIGHT: 137.4 LBS | SYSTOLIC BLOOD PRESSURE: 109 MMHG | HEIGHT: 60 IN | BODY MASS INDEX: 26.97 KG/M2 | DIASTOLIC BLOOD PRESSURE: 65 MMHG

## 2025-08-25 DIAGNOSIS — Z00.00 ENCOUNTER FOR MEDICARE ANNUAL WELLNESS EXAM: ICD-10-CM

## 2025-08-25 DIAGNOSIS — N28.9 FUNCTION KIDNEY DECREASED: ICD-10-CM

## 2025-08-25 DIAGNOSIS — M79.671 FOOT PAIN, BILATERAL: ICD-10-CM

## 2025-08-25 DIAGNOSIS — M25.511 ACUTE PAIN OF RIGHT SHOULDER: ICD-10-CM

## 2025-08-25 DIAGNOSIS — Z78.0 POSTMENOPAUSE: ICD-10-CM

## 2025-08-25 DIAGNOSIS — Z23 NEED FOR COVID-19 VACCINE: Primary | ICD-10-CM

## 2025-08-25 DIAGNOSIS — Z23 NEED FOR VACCINATION: ICD-10-CM

## 2025-08-25 DIAGNOSIS — M79.672 FOOT PAIN, BILATERAL: ICD-10-CM

## 2025-08-25 DIAGNOSIS — M79.89 LEG SWELLING: ICD-10-CM

## 2025-08-25 DIAGNOSIS — J45.40 MODERATE PERSISTENT REACTIVE AIRWAY DISEASE WITHOUT COMPLICATION: ICD-10-CM

## 2025-08-25 DIAGNOSIS — Z12.31 ENCOUNTER FOR SCREENING MAMMOGRAM FOR MALIGNANT NEOPLASM OF BREAST: ICD-10-CM

## 2025-08-25 RX ORDER — FUROSEMIDE 20 MG/1
20 TABLET ORAL DAILY
Qty: 30 TABLET | Refills: 6 | Status: SHIPPED | OUTPATIENT
Start: 2025-08-25

## 2025-08-25 RX ORDER — POTASSIUM CHLORIDE 750 MG/1
1 TABLET, EXTENDED RELEASE ORAL EVERY 12 HOURS SCHEDULED
COMMUNITY
Start: 2025-06-25

## 2025-08-25 RX ORDER — ALBUTEROL SULFATE 90 UG/1
2 INHALANT RESPIRATORY (INHALATION) EVERY 4 HOURS PRN
Qty: 54 G | Refills: 3 | Status: SHIPPED | OUTPATIENT
Start: 2025-08-25

## 2025-08-26 ENCOUNTER — LAB (OUTPATIENT)
Dept: FAMILY MEDICINE CLINIC | Facility: CLINIC | Age: 70
End: 2025-08-26
Payer: MEDICARE

## 2025-08-26 DIAGNOSIS — Z00.00 ENCOUNTER FOR MEDICARE ANNUAL WELLNESS EXAM: ICD-10-CM

## 2025-08-26 DIAGNOSIS — N28.9 FUNCTION KIDNEY DECREASED: ICD-10-CM

## 2025-08-26 LAB
ALBUMIN SERPL-MCNC: 2.6 G/DL (ref 3.5–5.2)
ALBUMIN/GLOB SERPL: 0.7 G/DL
ALP SERPL-CCNC: 191 U/L (ref 39–117)
ALT SERPL W P-5'-P-CCNC: 28 U/L (ref 1–33)
ANION GAP SERPL CALCULATED.3IONS-SCNC: 14.5 MMOL/L (ref 5–15)
AST SERPL-CCNC: 80 U/L (ref 1–32)
BILIRUB SERPL-MCNC: 2.3 MG/DL (ref 0–1.2)
BUN SERPL-MCNC: 16 MG/DL (ref 8–23)
BUN/CREAT SERPL: 16.7 (ref 7–25)
CALCIUM SPEC-SCNC: 8.5 MG/DL (ref 8.6–10.5)
CHLORIDE SERPL-SCNC: 103 MMOL/L (ref 98–107)
CHOLEST SERPL-MCNC: 181 MG/DL (ref 0–200)
CO2 SERPL-SCNC: 24.5 MMOL/L (ref 22–29)
CREAT SERPL-MCNC: 0.96 MG/DL (ref 0.57–1)
EGFRCR SERPLBLD CKD-EPI 2021: 63 ML/MIN/1.73
GLOBULIN UR ELPH-MCNC: 3.8 GM/DL
GLUCOSE SERPL-MCNC: 76 MG/DL (ref 65–99)
HBA1C MFR BLD: 4.4 % (ref 4.8–5.6)
HDLC SERPL-MCNC: 56 MG/DL (ref 40–60)
LDLC SERPL CALC-MCNC: 108 MG/DL (ref 0–100)
LDLC/HDLC SERPL: 1.91 {RATIO}
POTASSIUM SERPL-SCNC: 4.1 MMOL/L (ref 3.5–5.2)
PROT SERPL-MCNC: 6.4 G/DL (ref 6–8.5)
SODIUM SERPL-SCNC: 142 MMOL/L (ref 136–145)
TRIGL SERPL-MCNC: 91 MG/DL (ref 0–150)
TSH SERPL DL<=0.05 MIU/L-ACNC: 4.04 UIU/ML (ref 0.27–4.2)
VLDLC SERPL-MCNC: 17 MG/DL (ref 5–40)

## 2025-08-26 PROCEDURE — 83036 HEMOGLOBIN GLYCOSYLATED A1C: CPT

## 2025-08-26 PROCEDURE — 80053 COMPREHEN METABOLIC PANEL: CPT

## 2025-08-26 PROCEDURE — 36415 COLL VENOUS BLD VENIPUNCTURE: CPT

## 2025-08-26 PROCEDURE — 80061 LIPID PANEL: CPT

## 2025-08-26 PROCEDURE — 84443 ASSAY THYROID STIM HORMONE: CPT

## 2025-08-27 ENCOUNTER — RESULTS FOLLOW-UP (OUTPATIENT)
Dept: FAMILY MEDICINE CLINIC | Facility: CLINIC | Age: 70
End: 2025-08-27
Payer: MEDICARE

## 2025-08-29 ENCOUNTER — HOSPITAL ENCOUNTER (OUTPATIENT)
Dept: GENERAL RADIOLOGY | Facility: HOSPITAL | Age: 70
Discharge: HOME | End: 2025-08-29
Payer: MEDICARE

## 2025-08-29 DIAGNOSIS — M79.671 FOOT PAIN, BILATERAL: ICD-10-CM

## 2025-08-29 DIAGNOSIS — M25.511 ACUTE PAIN OF RIGHT SHOULDER: ICD-10-CM

## 2025-08-29 DIAGNOSIS — M79.672 FOOT PAIN, BILATERAL: ICD-10-CM

## 2025-08-29 PROCEDURE — 73630 X-RAY EXAM OF FOOT: CPT

## 2025-08-29 PROCEDURE — 73030 X-RAY EXAM OF SHOULDER: CPT

## (undated) DEVICE — PK ENDO GI 50

## (undated) DEVICE — SINGLE-USE BIOPSY FORCEPS: Brand: RADIAL JAW 4

## (undated) DEVICE — TRAP WIDEEYE POLYP

## (undated) DEVICE — SNAR POLYP HOTSNARE/BRAIDED OVL/MINI 7F 2.8X10MM 230CM 1P/U